# Patient Record
Sex: FEMALE | Race: BLACK OR AFRICAN AMERICAN | NOT HISPANIC OR LATINO | Employment: PART TIME | RURAL
[De-identification: names, ages, dates, MRNs, and addresses within clinical notes are randomized per-mention and may not be internally consistent; named-entity substitution may affect disease eponyms.]

---

## 2020-11-23 ENCOUNTER — HISTORICAL (OUTPATIENT)
Dept: ADMINISTRATIVE | Facility: HOSPITAL | Age: 60
End: 2020-11-23

## 2021-03-29 DIAGNOSIS — I10 HYPERTENSION, UNSPECIFIED TYPE: Primary | ICD-10-CM

## 2021-03-30 RX ORDER — AMLODIPINE BESYLATE 10 MG/1
10 TABLET ORAL DAILY
Qty: 30 TABLET | Refills: 3 | Status: CANCELLED | OUTPATIENT
Start: 2021-03-30 | End: 2022-03-30

## 2021-03-30 RX ORDER — AMLODIPINE BESYLATE 10 MG/1
1 TABLET ORAL DAILY
COMMUNITY
Start: 2021-01-31 | End: 2021-04-01 | Stop reason: SDUPTHER

## 2021-04-01 RX ORDER — AMLODIPINE BESYLATE 10 MG/1
10 TABLET ORAL DAILY
Qty: 30 TABLET | Refills: 11 | Status: CANCELLED | OUTPATIENT
Start: 2021-04-01 | End: 2022-04-01

## 2021-04-01 RX ORDER — AMLODIPINE BESYLATE 10 MG/1
10 TABLET ORAL DAILY
Qty: 30 TABLET | Refills: 3 | Status: SHIPPED | OUTPATIENT
Start: 2021-04-01 | End: 2021-04-07 | Stop reason: SDUPTHER

## 2021-04-07 ENCOUNTER — OFFICE VISIT (OUTPATIENT)
Dept: INTERNAL MEDICINE | Facility: CLINIC | Age: 61
End: 2021-04-07
Payer: COMMERCIAL

## 2021-04-07 VITALS
OXYGEN SATURATION: 98 % | BODY MASS INDEX: 32.82 KG/M2 | SYSTOLIC BLOOD PRESSURE: 166 MMHG | HEIGHT: 59 IN | WEIGHT: 162.81 LBS | RESPIRATION RATE: 18 BRPM | HEART RATE: 98 BPM | DIASTOLIC BLOOD PRESSURE: 90 MMHG

## 2021-04-07 DIAGNOSIS — K21.9 GASTROESOPHAGEAL REFLUX DISEASE, UNSPECIFIED WHETHER ESOPHAGITIS PRESENT: ICD-10-CM

## 2021-04-07 DIAGNOSIS — I10 ESSENTIAL HYPERTENSION: Primary | ICD-10-CM

## 2021-04-07 PROCEDURE — 99999 PR PBB SHADOW E&M-EST. PATIENT-LVL IV: ICD-10-PCS | Mod: PBBFAC,,, | Performed by: INTERNAL MEDICINE

## 2021-04-07 PROCEDURE — 99214 OFFICE O/P EST MOD 30 MIN: CPT | Mod: PBBFAC | Performed by: INTERNAL MEDICINE

## 2021-04-07 PROCEDURE — 99213 PR OFFICE/OUTPT VISIT, EST, LEVL III, 20-29 MIN: ICD-10-PCS | Mod: S$PBB,,, | Performed by: INTERNAL MEDICINE

## 2021-04-07 PROCEDURE — 99999 PR PBB SHADOW E&M-EST. PATIENT-LVL IV: CPT | Mod: PBBFAC,,, | Performed by: INTERNAL MEDICINE

## 2021-04-07 PROCEDURE — 99213 OFFICE O/P EST LOW 20 MIN: CPT | Mod: S$PBB,,, | Performed by: INTERNAL MEDICINE

## 2021-04-07 RX ORDER — AMLODIPINE BESYLATE 10 MG/1
10 TABLET ORAL DAILY
Qty: 30 TABLET | Refills: 3 | Status: SHIPPED | OUTPATIENT
Start: 2021-04-07 | End: 2021-10-18 | Stop reason: SDUPTHER

## 2021-04-07 RX ORDER — CARVEDILOL 6.25 MG/1
6.25 TABLET ORAL 2 TIMES DAILY
Qty: 60 TABLET | Refills: 4 | Status: SHIPPED | OUTPATIENT
Start: 2021-04-07 | End: 2021-10-18 | Stop reason: SDUPTHER

## 2021-04-07 RX ORDER — LISINOPRIL 20 MG/1
20 TABLET ORAL DAILY
Qty: 30 TABLET | Refills: 4 | Status: SHIPPED | OUTPATIENT
Start: 2021-04-07 | End: 2022-04-06 | Stop reason: SDUPTHER

## 2021-04-07 RX ORDER — PANTOPRAZOLE SODIUM 40 MG/1
1 TABLET, DELAYED RELEASE ORAL DAILY
COMMUNITY
Start: 2021-03-02 | End: 2021-04-07 | Stop reason: SDUPTHER

## 2021-04-07 RX ORDER — CARVEDILOL 6.25 MG/1
1 TABLET ORAL 2 TIMES DAILY
COMMUNITY
Start: 2021-01-31 | End: 2021-04-07 | Stop reason: SDUPTHER

## 2021-04-07 RX ORDER — PANTOPRAZOLE SODIUM 40 MG/1
40 TABLET, DELAYED RELEASE ORAL DAILY
Qty: 30 TABLET | Refills: 4 | Status: SHIPPED | OUTPATIENT
Start: 2021-04-07 | End: 2022-04-06 | Stop reason: SDUPTHER

## 2021-04-07 RX ORDER — LISINOPRIL 20 MG/1
20 TABLET ORAL DAILY
COMMUNITY
Start: 2021-03-22 | End: 2021-04-07 | Stop reason: SDUPTHER

## 2021-06-07 ENCOUNTER — OFFICE VISIT (OUTPATIENT)
Dept: INTERNAL MEDICINE | Facility: CLINIC | Age: 61
End: 2021-06-07
Payer: COMMERCIAL

## 2021-06-07 VITALS
DIASTOLIC BLOOD PRESSURE: 90 MMHG | WEIGHT: 161.19 LBS | HEART RATE: 89 BPM | SYSTOLIC BLOOD PRESSURE: 136 MMHG | BODY MASS INDEX: 32.49 KG/M2 | HEIGHT: 59 IN | RESPIRATION RATE: 17 BRPM | OXYGEN SATURATION: 100 %

## 2021-06-07 DIAGNOSIS — I10 ESSENTIAL HYPERTENSION: Primary | Chronic | ICD-10-CM

## 2021-06-07 DIAGNOSIS — K21.9 GASTROESOPHAGEAL REFLUX DISEASE, UNSPECIFIED WHETHER ESOPHAGITIS PRESENT: Chronic | ICD-10-CM

## 2021-06-07 PROCEDURE — 99212 OFFICE O/P EST SF 10 MIN: CPT | Mod: S$PBB,,, | Performed by: INTERNAL MEDICINE

## 2021-06-07 PROCEDURE — 99214 OFFICE O/P EST MOD 30 MIN: CPT | Mod: PBBFAC | Performed by: INTERNAL MEDICINE

## 2021-06-07 PROCEDURE — 99212 PR OFFICE/OUTPT VISIT, EST, LEVL II, 10-19 MIN: ICD-10-PCS | Mod: S$PBB,,, | Performed by: INTERNAL MEDICINE

## 2021-09-07 ENCOUNTER — OFFICE VISIT (OUTPATIENT)
Dept: FAMILY MEDICINE | Facility: CLINIC | Age: 61
End: 2021-09-07
Payer: COMMERCIAL

## 2021-09-07 VITALS
WEIGHT: 156 LBS | OXYGEN SATURATION: 100 % | BODY MASS INDEX: 31.45 KG/M2 | RESPIRATION RATE: 20 BRPM | SYSTOLIC BLOOD PRESSURE: 154 MMHG | HEIGHT: 59 IN | HEART RATE: 85 BPM | DIASTOLIC BLOOD PRESSURE: 100 MMHG

## 2021-09-07 DIAGNOSIS — F41.9 ANXIETY: Primary | ICD-10-CM

## 2021-09-07 DIAGNOSIS — K21.9 GASTROESOPHAGEAL REFLUX DISEASE, UNSPECIFIED WHETHER ESOPHAGITIS PRESENT: ICD-10-CM

## 2021-09-07 DIAGNOSIS — I10 ESSENTIAL HYPERTENSION: ICD-10-CM

## 2021-09-07 PROCEDURE — 99214 PR OFFICE/OUTPT VISIT, EST, LEVL IV, 30-39 MIN: ICD-10-PCS | Mod: ,,, | Performed by: INTERNAL MEDICINE

## 2021-09-07 PROCEDURE — 99214 OFFICE O/P EST MOD 30 MIN: CPT | Mod: ,,, | Performed by: INTERNAL MEDICINE

## 2021-09-07 RX ORDER — ALPRAZOLAM 0.25 MG/1
0.25 TABLET ORAL NIGHTLY PRN
Qty: 30 TABLET | Refills: 1 | Status: SHIPPED | OUTPATIENT
Start: 2021-09-07 | End: 2023-05-25 | Stop reason: SDUPTHER

## 2021-10-18 ENCOUNTER — OFFICE VISIT (OUTPATIENT)
Dept: FAMILY MEDICINE | Facility: CLINIC | Age: 61
End: 2021-10-18
Payer: COMMERCIAL

## 2021-10-18 VITALS
HEART RATE: 89 BPM | OXYGEN SATURATION: 98 % | SYSTOLIC BLOOD PRESSURE: 152 MMHG | WEIGHT: 157.63 LBS | DIASTOLIC BLOOD PRESSURE: 96 MMHG | RESPIRATION RATE: 18 BRPM | HEIGHT: 59 IN | BODY MASS INDEX: 31.78 KG/M2

## 2021-10-18 DIAGNOSIS — J32.9 SINUSITIS, UNSPECIFIED CHRONICITY, UNSPECIFIED LOCATION: ICD-10-CM

## 2021-10-18 DIAGNOSIS — I10 PRIMARY HYPERTENSION: ICD-10-CM

## 2021-10-18 DIAGNOSIS — R06.00 DYSPNEA, UNSPECIFIED TYPE: Primary | ICD-10-CM

## 2021-10-18 PROCEDURE — 99214 PR OFFICE/OUTPT VISIT, EST, LEVL IV, 30-39 MIN: ICD-10-PCS | Mod: ,,, | Performed by: INTERNAL MEDICINE

## 2021-10-18 PROCEDURE — 99214 OFFICE O/P EST MOD 30 MIN: CPT | Mod: ,,, | Performed by: INTERNAL MEDICINE

## 2021-10-18 RX ORDER — CARVEDILOL 6.25 MG/1
6.25 TABLET ORAL 2 TIMES DAILY
Qty: 60 TABLET | Refills: 3 | Status: SHIPPED | OUTPATIENT
Start: 2021-10-18 | End: 2022-04-06 | Stop reason: SDUPTHER

## 2021-10-18 RX ORDER — AMLODIPINE BESYLATE 10 MG/1
10 TABLET ORAL DAILY
Qty: 30 TABLET | Refills: 3 | Status: SHIPPED | OUTPATIENT
Start: 2021-10-18 | End: 2022-04-08

## 2021-10-18 RX ORDER — AMOXICILLIN AND CLAVULANATE POTASSIUM 500; 125 MG/1; MG/1
1 TABLET, FILM COATED ORAL 2 TIMES DAILY
Qty: 14 TABLET | Refills: 0 | Status: SHIPPED | OUTPATIENT
Start: 2021-10-18 | End: 2021-12-20 | Stop reason: SDUPTHER

## 2021-12-20 ENCOUNTER — OFFICE VISIT (OUTPATIENT)
Dept: FAMILY MEDICINE | Facility: CLINIC | Age: 61
End: 2021-12-20
Payer: COMMERCIAL

## 2021-12-20 VITALS
BODY MASS INDEX: 31.85 KG/M2 | DIASTOLIC BLOOD PRESSURE: 90 MMHG | RESPIRATION RATE: 80 BRPM | HEART RATE: 80 BPM | OXYGEN SATURATION: 99 % | HEIGHT: 59 IN | SYSTOLIC BLOOD PRESSURE: 136 MMHG | WEIGHT: 158 LBS

## 2021-12-20 DIAGNOSIS — I50.9 CONGESTIVE HEART FAILURE, UNSPECIFIED HF CHRONICITY, UNSPECIFIED HEART FAILURE TYPE: ICD-10-CM

## 2021-12-20 DIAGNOSIS — J32.9 SINUSITIS, UNSPECIFIED CHRONICITY, UNSPECIFIED LOCATION: Primary | ICD-10-CM

## 2021-12-20 PROCEDURE — 4010F ACE/ARB THERAPY RXD/TAKEN: CPT | Mod: CPTII,,, | Performed by: INTERNAL MEDICINE

## 2021-12-20 PROCEDURE — 99213 OFFICE O/P EST LOW 20 MIN: CPT | Mod: ,,, | Performed by: INTERNAL MEDICINE

## 2021-12-20 PROCEDURE — 4010F PR ACE/ARB THEARPY RXD/TAKEN: ICD-10-PCS | Mod: CPTII,,, | Performed by: INTERNAL MEDICINE

## 2021-12-20 PROCEDURE — 99213 PR OFFICE/OUTPT VISIT, EST, LEVL III, 20-29 MIN: ICD-10-PCS | Mod: ,,, | Performed by: INTERNAL MEDICINE

## 2021-12-21 RX ORDER — AMOXICILLIN AND CLAVULANATE POTASSIUM 500; 125 MG/1; MG/1
1 TABLET, FILM COATED ORAL 2 TIMES DAILY
Qty: 14 TABLET | Refills: 0 | Status: SHIPPED | OUTPATIENT
Start: 2021-12-21 | End: 2022-04-06

## 2022-04-06 ENCOUNTER — OFFICE VISIT (OUTPATIENT)
Dept: FAMILY MEDICINE | Facility: CLINIC | Age: 62
End: 2022-04-06
Payer: COMMERCIAL

## 2022-04-06 VITALS
WEIGHT: 151.81 LBS | DIASTOLIC BLOOD PRESSURE: 68 MMHG | RESPIRATION RATE: 18 BRPM | SYSTOLIC BLOOD PRESSURE: 116 MMHG | HEART RATE: 80 BPM | TEMPERATURE: 98 F | HEIGHT: 59 IN | OXYGEN SATURATION: 97 % | BODY MASS INDEX: 30.6 KG/M2

## 2022-04-06 DIAGNOSIS — I10 PRIMARY HYPERTENSION: Primary | ICD-10-CM

## 2022-04-06 DIAGNOSIS — Z12.39 SCREENING BREAST EXAMINATION: ICD-10-CM

## 2022-04-06 DIAGNOSIS — K21.9 GASTROESOPHAGEAL REFLUX DISEASE, UNSPECIFIED WHETHER ESOPHAGITIS PRESENT: ICD-10-CM

## 2022-04-06 LAB
ANION GAP SERPL CALCULATED.3IONS-SCNC: 8 MMOL/L (ref 7–16)
BUN SERPL-MCNC: 10 MG/DL (ref 7–18)
BUN/CREAT SERPL: 11 (ref 6–20)
CALCIUM SERPL-MCNC: 9.5 MG/DL (ref 8.5–10.1)
CHLORIDE SERPL-SCNC: 104 MMOL/L (ref 98–107)
CO2 SERPL-SCNC: 32 MMOL/L (ref 21–32)
CREAT SERPL-MCNC: 0.88 MG/DL (ref 0.55–1.02)
GLUCOSE SERPL-MCNC: 96 MG/DL (ref 74–106)
POTASSIUM SERPL-SCNC: 2.6 MMOL/L (ref 3.5–5.1)
SODIUM SERPL-SCNC: 141 MMOL/L (ref 136–145)

## 2022-04-06 PROCEDURE — 4010F ACE/ARB THERAPY RXD/TAKEN: CPT | Mod: CPTII,,, | Performed by: INTERNAL MEDICINE

## 2022-04-06 PROCEDURE — 1159F MED LIST DOCD IN RCRD: CPT | Mod: CPTII,,, | Performed by: INTERNAL MEDICINE

## 2022-04-06 PROCEDURE — 3008F PR BODY MASS INDEX (BMI) DOCUMENTED: ICD-10-PCS | Mod: CPTII,,, | Performed by: INTERNAL MEDICINE

## 2022-04-06 PROCEDURE — 99214 PR OFFICE/OUTPT VISIT, EST, LEVL IV, 30-39 MIN: ICD-10-PCS | Mod: ,,, | Performed by: INTERNAL MEDICINE

## 2022-04-06 PROCEDURE — 1159F PR MEDICATION LIST DOCUMENTED IN MEDICAL RECORD: ICD-10-PCS | Mod: CPTII,,, | Performed by: INTERNAL MEDICINE

## 2022-04-06 PROCEDURE — 3074F PR MOST RECENT SYSTOLIC BLOOD PRESSURE < 130 MM HG: ICD-10-PCS | Mod: CPTII,,, | Performed by: INTERNAL MEDICINE

## 2022-04-06 PROCEDURE — 3078F PR MOST RECENT DIASTOLIC BLOOD PRESSURE < 80 MM HG: ICD-10-PCS | Mod: CPTII,,, | Performed by: INTERNAL MEDICINE

## 2022-04-06 PROCEDURE — 4010F PR ACE/ARB THEARPY RXD/TAKEN: ICD-10-PCS | Mod: CPTII,,, | Performed by: INTERNAL MEDICINE

## 2022-04-06 PROCEDURE — 99214 OFFICE O/P EST MOD 30 MIN: CPT | Mod: ,,, | Performed by: INTERNAL MEDICINE

## 2022-04-06 PROCEDURE — 3008F BODY MASS INDEX DOCD: CPT | Mod: CPTII,,, | Performed by: INTERNAL MEDICINE

## 2022-04-06 PROCEDURE — 3078F DIAST BP <80 MM HG: CPT | Mod: CPTII,,, | Performed by: INTERNAL MEDICINE

## 2022-04-06 PROCEDURE — 3074F SYST BP LT 130 MM HG: CPT | Mod: CPTII,,, | Performed by: INTERNAL MEDICINE

## 2022-04-06 PROCEDURE — 1160F PR REVIEW ALL MEDS BY PRESCRIBER/CLIN PHARMACIST DOCUMENTED: ICD-10-PCS | Mod: CPTII,,, | Performed by: INTERNAL MEDICINE

## 2022-04-06 PROCEDURE — 80048 BASIC METABOLIC PANEL: ICD-10-PCS | Mod: ,,, | Performed by: CLINICAL MEDICAL LABORATORY

## 2022-04-06 PROCEDURE — 1160F RVW MEDS BY RX/DR IN RCRD: CPT | Mod: CPTII,,, | Performed by: INTERNAL MEDICINE

## 2022-04-06 PROCEDURE — 80048 BASIC METABOLIC PNL TOTAL CA: CPT | Mod: ,,, | Performed by: CLINICAL MEDICAL LABORATORY

## 2022-04-06 RX ORDER — PANTOPRAZOLE SODIUM 40 MG/1
40 TABLET, DELAYED RELEASE ORAL DAILY
Qty: 90 TABLET | Refills: 1 | Status: SHIPPED | OUTPATIENT
Start: 2022-04-06 | End: 2022-06-15

## 2022-04-06 RX ORDER — CARVEDILOL 6.25 MG/1
6.25 TABLET ORAL 2 TIMES DAILY
Qty: 180 TABLET | Refills: 1 | Status: SHIPPED | OUTPATIENT
Start: 2022-04-06 | End: 2022-08-29 | Stop reason: SDUPTHER

## 2022-04-06 RX ORDER — LISINOPRIL 20 MG/1
20 TABLET ORAL DAILY
Qty: 90 TABLET | Refills: 1 | Status: SHIPPED | OUTPATIENT
Start: 2022-04-06 | End: 2022-10-24

## 2022-04-07 NOTE — PROGRESS NOTES
Subjective:       Patient ID: Faviola King is a 61 y.o. female.    Chief Complaint: Hypertension, Follow-up, and Medication Refill    Patient is here today for a follow up evaluation. Patient pressure is stable today on intake. Patient states pressure is up and down. Patient also states acid reflux has been better. Discussed health maintenance with patient. Recommend Mammogram and Colonoscopy. Will order baseline labs today. Will follow in 3 months.       Current Medications:    Current Outpatient Medications:     ALPRAZolam (XANAX) 0.25 MG tablet, Take 1 tablet (0.25 mg total) by mouth nightly as needed for Anxiety., Disp: 30 tablet, Rfl: 1    amLODIPine (NORVASC) 10 MG tablet, Take 1 tablet (10 mg total) by mouth once daily., Disp: 30 tablet, Rfl: 3    carvediloL (COREG) 6.25 MG tablet, Take 1 tablet (6.25 mg total) by mouth 2 (two) times a day., Disp: 180 tablet, Rfl: 1    lisinopriL (PRINIVIL,ZESTRIL) 20 MG tablet, Take 1 tablet (20 mg total) by mouth once daily., Disp: 90 tablet, Rfl: 1    pantoprazole (PROTONIX) 40 MG tablet, Take 1 tablet (40 mg total) by mouth once daily., Disp: 90 tablet, Rfl: 1    Last Labs:     Office Visit on 04/06/2022   Component Date Value    Sodium 04/06/2022 141     Potassium 04/06/2022 2.6 (A)    Chloride 04/06/2022 104     CO2 04/06/2022 32     Anion Gap 04/06/2022 8     Glucose 04/06/2022 96     BUN 04/06/2022 10     Creatinine 04/06/2022 0.88     BUN/Creatinine Ratio 04/06/2022 11     Calcium 04/06/2022 9.5     eGFR 04/06/2022 69        Last Imaging:  X-Ray Sinuses 3 or more views  Narrative: EXAMINATION:  XR SINUSES MIN 3 VIEWS    CLINICAL HISTORY:  Chronic sinusitis, unspecified    COMPARISON:  None    TECHNIQUE:  Three views of the sinuses.    FINDINGS:  There is a 2.9 cm round density projecting over the right aspect of the calvarium on frontal view which could be intracranial but is not demonstrated with certainty on lateral view.  Recommend CT brain for  further evaluation.  No significant sinus opacification or air-fluid level to suggest acute sinusitis.  Impression: As above.    Point of Service: [facility]    Electronically signed by: Dong Riggs  Date:    10/18/2021  Time:    15:57  X-Ray Chest PA And Lateral  Narrative: EXAMINATION:  XR CHEST PA AND LATERAL    CLINICAL HISTORY:  Dyspnea, unspecified    COMPARISON:  Chest x-ray October 9, 2019    TECHNIQUE:  Frontal and lateral views of the chest.    FINDINGS:  The cardiomediastinal silhouette is stable in configuration.  Chronic change of the lungs without focal consolidation, pleural effusion, or pneumothorax.  Visualized osseous and surrounding soft tissue structures appear grossly unchanged.  Impression: Stable chest x-ray without acute cardiopulmonary process demonstrated.    Point of Service: [facility]    Electronically signed by: Dong Riggs  Date:    10/18/2021  Time:    15:54         Review of Systems   All other systems reviewed and are negative.        Objective:      Physical Exam  Vitals reviewed.   Constitutional:       Appearance: Normal appearance. She is normal weight.   Cardiovascular:      Rate and Rhythm: Normal rate and regular rhythm.      Pulses: Normal pulses.      Heart sounds: Normal heart sounds.   Pulmonary:      Effort: Pulmonary effort is normal.      Breath sounds: Normal breath sounds.   Abdominal:      General: Abdomen is flat. Bowel sounds are normal.      Palpations: Abdomen is soft.   Musculoskeletal:         General: Normal range of motion.      Cervical back: Normal range of motion and neck supple.   Skin:     General: Skin is warm and dry.   Neurological:      General: No focal deficit present.      Mental Status: She is alert and oriented to person, place, and time. Mental status is at baseline.         Assessment:       1. Primary hypertension  Basic Metabolic Panel    Basic Metabolic Panel   2. Gastroesophageal reflux disease, unspecified  whether esophagitis present  Ambulatory referral/consult to Gastroenterology   3. Screening breast examination  Mammo Digital Screening Bilat        Plan:         Faviola was seen today for hypertension, follow-up and medication refill.    Diagnoses and all orders for this visit:    Primary hypertension  -     Basic Metabolic Panel; Future  -     Basic Metabolic Panel    Gastroesophageal reflux disease, unspecified whether esophagitis present  -     Ambulatory referral/consult to Gastroenterology; Future    Screening breast examination  -     Mammo Digital Screening Bilat; Future    Other orders  -     carvediloL (COREG) 6.25 MG tablet; Take 1 tablet (6.25 mg total) by mouth 2 (two) times a day.  -     lisinopriL (PRINIVIL,ZESTRIL) 20 MG tablet; Take 1 tablet (20 mg total) by mouth once daily.  -     pantoprazole (PROTONIX) 40 MG tablet; Take 1 tablet (40 mg total) by mouth once daily.

## 2022-04-07 NOTE — PATIENT INSTRUCTIONS
Faviola was seen today for hypertension, follow-up and medication refill.    Diagnoses and all orders for this visit:    Primary hypertension  -     Basic Metabolic Panel; Future  -     Basic Metabolic Panel    Gastroesophageal reflux disease, unspecified whether esophagitis present  -     Ambulatory referral/consult to Gastroenterology; Future    Screening breast examination  -     Mammo Digital Screening Bilat; Future    Other orders  -     carvediloL (COREG) 6.25 MG tablet; Take 1 tablet (6.25 mg total) by mouth 2 (two) times a day.  -     lisinopriL (PRINIVIL,ZESTRIL) 20 MG tablet; Take 1 tablet (20 mg total) by mouth once daily.  -     pantoprazole (PROTONIX) 40 MG tablet; Take 1 tablet (40 mg total) by mouth once daily.

## 2022-04-11 DIAGNOSIS — E87.6 HYPOKALEMIA: Primary | ICD-10-CM

## 2022-04-11 RX ORDER — POTASSIUM CHLORIDE 20 MEQ/1
20 TABLET, EXTENDED RELEASE ORAL 3 TIMES DAILY
Qty: 9 TABLET | Refills: 0 | Status: SHIPPED | OUTPATIENT
Start: 2022-04-11 | End: 2022-04-14

## 2022-04-11 NOTE — TELEPHONE ENCOUNTER
Call made to pt to inform her that her K+ level is low and that Dr. Larson Is sending her in Potassium 20meq to take 1 tab tid x 3 days then come in on Friday for K+ level recheck. Pt stated that she has another appt on Friday and can't come on that day. I told her that she will be coming in for lab only and to come on Monday morning if she doesn't make it on Friday. Pt verbalized understanding and had no other concerns.

## 2022-04-11 NOTE — TELEPHONE ENCOUNTER
----- Message from Shantanu Larson MD sent at 4/7/2022  6:13 PM CDT -----  Kcl 20meq pi tid x 3 days     stat    today    k level Monday am please      call pt

## 2022-05-01 ENCOUNTER — HOSPITAL ENCOUNTER (EMERGENCY)
Facility: HOSPITAL | Age: 62
Discharge: HOME | End: 2022-05-02
Attending: FAMILY MEDICINE
Payer: COMMERCIAL

## 2022-05-01 DIAGNOSIS — N12 PYELONEPHRITIS: Primary | ICD-10-CM

## 2022-05-01 LAB
BACTERIA #/AREA URNS HPF: ABNORMAL /HPF
BASOPHILS # BLD AUTO: 0.03 K/UL (ref 0–0.2)
BASOPHILS NFR BLD AUTO: 0.2 % (ref 0–1)
BILIRUB UR QL STRIP: NEGATIVE
CLARITY UR: ABNORMAL
COLOR UR: YELLOW
DIFFERENTIAL METHOD BLD: ABNORMAL
EOSINOPHIL # BLD AUTO: 0.11 K/UL (ref 0–0.5)
EOSINOPHIL NFR BLD AUTO: 0.8 % (ref 1–4)
ERYTHROCYTE [DISTWIDTH] IN BLOOD BY AUTOMATED COUNT: 14.8 % (ref 11.5–14.5)
GLUCOSE UR STRIP-MCNC: NEGATIVE MG/DL
HCT VFR BLD AUTO: 41.9 % (ref 38–47)
HGB BLD-MCNC: 14 G/DL (ref 12–16)
IMM GRANULOCYTES # BLD AUTO: 0.05 K/UL (ref 0–0.04)
IMM GRANULOCYTES NFR BLD: 0.4 % (ref 0–0.4)
KETONES UR STRIP-SCNC: NEGATIVE MG/DL
LEUKOCYTE ESTERASE UR QL STRIP: ABNORMAL
LYMPHOCYTES # BLD AUTO: 1.95 K/UL (ref 1–4.8)
LYMPHOCYTES NFR BLD AUTO: 13.7 % (ref 27–41)
MCH RBC QN AUTO: 29.9 PG (ref 27–31)
MCHC RBC AUTO-ENTMCNC: 33.4 G/DL (ref 32–36)
MCV RBC AUTO: 89.5 FL (ref 80–96)
MONOCYTES # BLD AUTO: 1.14 K/UL (ref 0–0.8)
MONOCYTES NFR BLD AUTO: 8 % (ref 2–6)
MPC BLD CALC-MCNC: 9.7 FL (ref 9.4–12.4)
MUCOUS THREADS #/AREA URNS HPF: ABNORMAL /HPF
NEUTROPHILS # BLD AUTO: 10.97 K/UL (ref 1.8–7.7)
NEUTROPHILS NFR BLD AUTO: 76.9 % (ref 53–65)
NITRITE UR QL STRIP: NEGATIVE
NRBC # BLD AUTO: 0 X10E3/UL
NRBC, AUTO (.00): 0 %
PH UR STRIP: 6 PH UNITS
PLATELET # BLD AUTO: 311 K/UL (ref 150–400)
PROT UR QL STRIP: >=300
RBC # BLD AUTO: 4.68 M/UL (ref 4.2–5.4)
RBC # UR STRIP: ABNORMAL /UL
RBC #/AREA URNS HPF: ABNORMAL /HPF
SP GR UR STRIP: 1.02
SQUAMOUS #/AREA URNS LPF: ABNORMAL /LPF
TRICHOMONAS #/AREA URNS HPF: ABNORMAL /HPF
UROBILINOGEN UR STRIP-ACNC: 0.2 MG/DL
WBC # BLD AUTO: 14.25 K/UL (ref 4.5–11)
WBC #/AREA URNS HPF: ABNORMAL /HPF
WBC CLUMPS, UA: ABNORMAL /HPF

## 2022-05-01 PROCEDURE — 87077 CULTURE AEROBIC IDENTIFY: CPT | Performed by: FAMILY MEDICINE

## 2022-05-01 PROCEDURE — 83690 ASSAY OF LIPASE: CPT | Performed by: FAMILY MEDICINE

## 2022-05-01 PROCEDURE — 99284 EMERGENCY DEPT VISIT MOD MDM: CPT | Mod: 25

## 2022-05-01 PROCEDURE — 80053 COMPREHEN METABOLIC PANEL: CPT | Performed by: FAMILY MEDICINE

## 2022-05-01 PROCEDURE — 85025 COMPLETE CBC W/AUTO DIFF WBC: CPT | Performed by: FAMILY MEDICINE

## 2022-05-01 PROCEDURE — 96375 TX/PRO/DX INJ NEW DRUG ADDON: CPT

## 2022-05-01 PROCEDURE — 99283 PR EMERGENCY DEPT VISIT,LEVEL III: ICD-10-PCS | Mod: ,,, | Performed by: FAMILY MEDICINE

## 2022-05-01 PROCEDURE — 96365 THER/PROPH/DIAG IV INF INIT: CPT

## 2022-05-01 PROCEDURE — 87186 SC STD MICRODIL/AGAR DIL: CPT | Performed by: FAMILY MEDICINE

## 2022-05-01 PROCEDURE — 96361 HYDRATE IV INFUSION ADD-ON: CPT

## 2022-05-01 PROCEDURE — 63600175 PHARM REV CODE 636 W HCPCS: Performed by: FAMILY MEDICINE

## 2022-05-01 PROCEDURE — 81001 URINALYSIS AUTO W/SCOPE: CPT | Performed by: FAMILY MEDICINE

## 2022-05-01 PROCEDURE — 36415 COLL VENOUS BLD VENIPUNCTURE: CPT | Performed by: FAMILY MEDICINE

## 2022-05-01 PROCEDURE — 99283 EMERGENCY DEPT VISIT LOW MDM: CPT | Mod: ,,, | Performed by: FAMILY MEDICINE

## 2022-05-01 PROCEDURE — 25000003 PHARM REV CODE 250: Performed by: FAMILY MEDICINE

## 2022-05-01 RX ORDER — ONDANSETRON 2 MG/ML
4 INJECTION INTRAMUSCULAR; INTRAVENOUS
Status: COMPLETED | OUTPATIENT
Start: 2022-05-01 | End: 2022-05-01

## 2022-05-01 RX ADMIN — ONDANSETRON 4 MG: 2 INJECTION INTRAMUSCULAR; INTRAVENOUS at 11:05

## 2022-05-01 RX ADMIN — SODIUM CHLORIDE 500 ML: 9 INJECTION, SOLUTION INTRAVENOUS at 11:05

## 2022-05-01 NOTE — Clinical Note
"Faviola "Faviola" Fernando was seen and treated in our emergency department on 5/1/2022.  She may return to work on 05/05/2022.       If you have any questions or concerns, please don't hesitate to call.      Mamta Ohara MD"

## 2022-05-02 ENCOUNTER — TELEPHONE (OUTPATIENT)
Dept: EMERGENCY MEDICINE | Facility: HOSPITAL | Age: 62
End: 2022-05-02
Payer: COMMERCIAL

## 2022-05-02 VITALS
HEIGHT: 59 IN | HEART RATE: 77 BPM | SYSTOLIC BLOOD PRESSURE: 107 MMHG | WEIGHT: 151 LBS | TEMPERATURE: 98 F | DIASTOLIC BLOOD PRESSURE: 60 MMHG | OXYGEN SATURATION: 98 % | BODY MASS INDEX: 30.44 KG/M2 | RESPIRATION RATE: 25 BRPM

## 2022-05-02 LAB
ALBUMIN SERPL BCP-MCNC: 3.7 G/DL (ref 3.5–5)
ALBUMIN/GLOB SERPL: 0.9 {RATIO}
ALP SERPL-CCNC: 78 U/L (ref 50–130)
ALT SERPL W P-5'-P-CCNC: 13 U/L (ref 13–56)
ANION GAP SERPL CALCULATED.3IONS-SCNC: 10 MMOL/L (ref 7–16)
AST SERPL W P-5'-P-CCNC: 12 U/L (ref 15–37)
BILIRUB SERPL-MCNC: 0.3 MG/DL (ref 0–1.2)
BUN SERPL-MCNC: 11 MG/DL (ref 7–18)
BUN/CREAT SERPL: 14 (ref 6–20)
CALCIUM SERPL-MCNC: 9.6 MG/DL (ref 8.5–10.1)
CHLORIDE SERPL-SCNC: 103 MMOL/L (ref 98–107)
CO2 SERPL-SCNC: 31 MMOL/L (ref 21–32)
CREAT SERPL-MCNC: 0.76 MG/DL (ref 0.55–1.02)
GLOBULIN SER-MCNC: 4 G/DL (ref 2–4)
GLUCOSE SERPL-MCNC: 93 MG/DL (ref 74–106)
LIPASE SERPL-CCNC: 115 U/L (ref 73–393)
POTASSIUM SERPL-SCNC: 3.1 MMOL/L (ref 3.5–5.1)
PROT SERPL-MCNC: 7.7 G/DL (ref 6.4–8.2)
SODIUM SERPL-SCNC: 141 MMOL/L (ref 136–145)

## 2022-05-02 PROCEDURE — 25000003 PHARM REV CODE 250: Performed by: FAMILY MEDICINE

## 2022-05-02 PROCEDURE — 63600175 PHARM REV CODE 636 W HCPCS: Performed by: FAMILY MEDICINE

## 2022-05-02 RX ORDER — ONDANSETRON 4 MG/1
8 TABLET, ORALLY DISINTEGRATING ORAL 2 TIMES DAILY
Qty: 20 TABLET | Refills: 0 | Status: SHIPPED | OUTPATIENT
Start: 2022-05-02

## 2022-05-02 RX ORDER — AMOXICILLIN AND CLAVULANATE POTASSIUM 875; 125 MG/1; MG/1
1 TABLET, FILM COATED ORAL 2 TIMES DAILY
Qty: 14 TABLET | Refills: 0 | Status: SHIPPED | OUTPATIENT
Start: 2022-05-02 | End: 2022-05-02 | Stop reason: SDUPTHER

## 2022-05-02 RX ORDER — AMOXICILLIN AND CLAVULANATE POTASSIUM 875; 125 MG/1; MG/1
1 TABLET, FILM COATED ORAL 2 TIMES DAILY
Qty: 20 TABLET | Refills: 0 | Status: SHIPPED | OUTPATIENT
Start: 2022-05-02 | End: 2022-10-24 | Stop reason: ALTCHOICE

## 2022-05-02 RX ADMIN — CEFTRIAXONE SODIUM 1 G: 1 INJECTION, POWDER, FOR SOLUTION INTRAMUSCULAR; INTRAVENOUS at 12:05

## 2022-05-02 NOTE — ED PROVIDER NOTES
Encounter Date: 2022    SCRIBE #1 NOTE: I, Barbara Jeryr, am scribing for, and in the presence of,  Mamta Ohara. I have scribed the entire note.       History     Chief Complaint   Patient presents with    Abdominal Pain     Abdominal pain starting around 2100, burning with urination     The patient is a 61 year old female with complaints of abdominal pain. She reports the pain began at approximately 21:00 and is accompanied by urinary frequency, dysuria, and vomiting. She reports the pain is in the suprapubic region and radiates into her back. She denies any recent fever or other complaints.    The history is provided by the patient. No  was used.     Review of patient's allergies indicates:  No Known Allergies  Past Medical History:   Diagnosis Date    GERD (gastroesophageal reflux disease)     Hypertension      Past Surgical History:   Procedure Laterality Date     SECTION      HYSTERECTOMY       Family History   Problem Relation Age of Onset    Hypertension Neg Hx     Diabetes Neg Hx     Cancer Neg Hx      Social History     Tobacco Use    Smoking status: Current Some Day Smoker    Smokeless tobacco: Never Used   Substance Use Topics    Alcohol use: Never     Review of Systems   Constitutional: Negative for fever.   Gastrointestinal: Positive for abdominal pain and vomiting.   Genitourinary: Positive for dysuria and frequency.   All other systems reviewed and are negative.      Physical Exam     Initial Vitals [22 2302]   BP Pulse Resp Temp SpO2   131/87 91 18 98.4 °F (36.9 °C) 98 %      MAP       --         Physical Exam    Constitutional: She appears well-developed and well-nourished.   HENT:   Head: Normocephalic and atraumatic.   Eyes: Conjunctivae and EOM are normal. Pupils are equal, round, and reactive to light.   Neck: Neck supple.   Normal range of motion.  Cardiovascular: Normal rate and regular rhythm.   Pulmonary/Chest: Breath sounds normal. No  respiratory distress.   Abdominal: Abdomen is soft. There is abdominal tenderness (CVA tenderness).   Musculoskeletal:         General: Normal range of motion.      Cervical back: Normal range of motion and neck supple.     Neurological: She is alert and oriented to person, place, and time.   Skin: Skin is warm and dry. Capillary refill takes less than 2 seconds.   Psychiatric: She has a normal mood and affect. Thought content normal.         ED Course   Procedures  Labs Reviewed   URINALYSIS, REFLEX TO URINE CULTURE - Abnormal; Notable for the following components:       Result Value    Clarity, UA Slightly Cloudy (*)     Leukocytes, UA Moderate (*)     Protein, UA >=300 (*)     Blood, UA Large (*)     All other components within normal limits   COMPREHENSIVE METABOLIC PANEL - Abnormal; Notable for the following components:    Potassium 3.1 (*)     AST 12 (*)     All other components within normal limits   CBC WITH DIFFERENTIAL - Abnormal; Notable for the following components:    WBC 14.25 (*)     RDW 14.8 (*)     Neutrophils % 76.9 (*)     Lymphocytes % 13.7 (*)     Monocytes % 8.0 (*)     Eosinophils % 0.8 (*)     Neutrophils, Abs 10.97 (*)     Monocytes, Absolute 1.14 (*)     Immature Granulocytes, Absolute 0.05 (*)     All other components within normal limits   URINALYSIS, MICROSCOPIC - Abnormal; Notable for the following components:    WBC, UA Too Numerous To Count (*)     RBC, UA Too Numerous To Count (*)     Bacteria, UA Occasional (*)     Squamous Epithelial Cells, UA Few (*)     WBC Clumps Few (*)     Mucus, UA Rare (*)     Trichomonas, UA Rare (*)     All other components within normal limits   LIPASE - Normal   CULTURE, URINE   CBC W/ AUTO DIFFERENTIAL    Narrative:     The following orders were created for panel order CBC auto differential.  Procedure                               Abnormality         Status                     ---------                               -----------         ------                      CBC with Differential[[id number]]        Abnormal            Final result                 Please view results for these tests on the individual orders.   EXTRA TUBES    Narrative:     The following orders were created for panel order EXTRA TUBES.  Procedure                               Abnormality         Status                     ---------                               -----------         ------                     Light Blue Top Hold[[id number]]                              In process                 Light Green Top Hold[[id number]]                             In process                   Please view results for these tests on the individual orders.   LIGHT BLUE TOP HOLD   LIGHT GREEN TOP HOLD          Imaging Results    None          Medications   cefTRIAXone (ROCEPHIN) 1 g in dextrose 5 % in water (D5W) 5 % 50 mL IVPB (MB+) (1 g Intravenous New Bag 5/2/22 0016)   sodium chloride 0.9% bolus 500 mL (0 mLs Intravenous Stopped 5/2/22 0007)   ondansetron injection 4 mg (4 mg Intravenous Given 5/1/22 9817)                Attending Attestation:           Physician Attestation for Scribe:  Physician Attestation Statement for Scribe #1: I, Mamta Ohara, reviewed documentation, as scribed by Barbara Edwards in my presence, and it is both accurate and complete.                      Clinical Impression:   Final diagnoses:  [N12] Pyelonephritis (Primary)          ED Disposition Condition    Discharge Stable        ED Prescriptions     Medication Sig Dispense Start Date End Date Auth. Provider    amoxicillin-clavulanate 875-125mg (AUGMENTIN) 875-125 mg per tablet  (Status: Discontinued) Take 1 tablet by mouth 2 (two) times daily. 14 tablet 5/2/2022 5/2/2022 Mamta Ohara MD    amoxicillin-clavulanate 875-125mg (AUGMENTIN) 875-125 mg per tablet Take 1 tablet by mouth 2 (two) times daily. 20 tablet 5/2/2022  Mamta Ohara MD    ondansetron (ZOFRAN-ODT) 4 MG TbDL Take 2 tablets (8 mg total) by mouth 2 (two) times  daily. 20 tablet 5/2/2022  Mamta Ohara MD        Follow-up Information    None          Mamta Ohara MD  05/02/22 0037

## 2022-05-02 NOTE — DISCHARGE INSTRUCTIONS
Drink plenty of water. Take all your antibiotics until gone. Return to the ED if your symptoms worsen.

## 2022-05-03 ENCOUNTER — TELEPHONE (OUTPATIENT)
Dept: EMERGENCY MEDICINE | Facility: HOSPITAL | Age: 62
End: 2022-05-03
Payer: COMMERCIAL

## 2022-05-03 LAB — UA COMPLETE W REFLEX CULTURE PNL UR: ABNORMAL

## 2022-05-03 RX ORDER — CEFUROXIME AXETIL 500 MG/1
500 TABLET ORAL EVERY 12 HOURS
Qty: 20 TABLET | Refills: 0 | Status: SHIPPED | OUTPATIENT
Start: 2022-05-03 | End: 2022-05-13

## 2022-05-05 RX ORDER — AMLODIPINE BESYLATE 10 MG/1
10 TABLET ORAL DAILY
Qty: 90 TABLET | Refills: 1 | Status: SHIPPED | OUTPATIENT
Start: 2022-05-05 | End: 2022-08-29 | Stop reason: SDUPTHER

## 2022-05-24 ENCOUNTER — OFFICE VISIT (OUTPATIENT)
Dept: FAMILY MEDICINE | Facility: CLINIC | Age: 62
End: 2022-05-24
Payer: COMMERCIAL

## 2022-05-24 VITALS
HEART RATE: 84 BPM | SYSTOLIC BLOOD PRESSURE: 122 MMHG | HEIGHT: 59 IN | BODY MASS INDEX: 29.8 KG/M2 | OXYGEN SATURATION: 98 % | RESPIRATION RATE: 20 BRPM | TEMPERATURE: 99 F | WEIGHT: 147.81 LBS | DIASTOLIC BLOOD PRESSURE: 68 MMHG

## 2022-05-24 DIAGNOSIS — Z13.1 SCREENING FOR DIABETES MELLITUS: ICD-10-CM

## 2022-05-24 DIAGNOSIS — Z00.00 ENCOUNTER FOR GENERAL ADULT MEDICAL EXAMINATION WITHOUT ABNORMAL FINDINGS: Primary | ICD-10-CM

## 2022-05-24 DIAGNOSIS — Z13.220 SCREENING FOR LIPOID DISORDERS: ICD-10-CM

## 2022-05-24 LAB
CHOLEST SERPL-MCNC: 198 MG/DL (ref 0–200)
CHOLEST/HDLC SERPL: 4.8 {RATIO}
GLUCOSE SERPL-MCNC: 79 MG/DL (ref 74–106)
HDLC SERPL-MCNC: 41 MG/DL (ref 40–60)
LDLC SERPL CALC-MCNC: 109 MG/DL
LDLC/HDLC SERPL: 2.7 {RATIO}
NONHDLC SERPL-MCNC: 157 MG/DL
TRIGL SERPL-MCNC: 240 MG/DL (ref 35–150)
VLDLC SERPL-MCNC: 48 MG/DL

## 2022-05-24 PROCEDURE — 1159F PR MEDICATION LIST DOCUMENTED IN MEDICAL RECORD: ICD-10-PCS | Mod: CPTII,,, | Performed by: NURSE PRACTITIONER

## 2022-05-24 PROCEDURE — 3008F BODY MASS INDEX DOCD: CPT | Mod: CPTII,,, | Performed by: NURSE PRACTITIONER

## 2022-05-24 PROCEDURE — 3074F PR MOST RECENT SYSTOLIC BLOOD PRESSURE < 130 MM HG: ICD-10-PCS | Mod: CPTII,,, | Performed by: NURSE PRACTITIONER

## 2022-05-24 PROCEDURE — 1159F MED LIST DOCD IN RCRD: CPT | Mod: CPTII,,, | Performed by: NURSE PRACTITIONER

## 2022-05-24 PROCEDURE — 82947 GLUCOSE, RANDOM: ICD-10-PCS | Mod: ,,, | Performed by: CLINICAL MEDICAL LABORATORY

## 2022-05-24 PROCEDURE — 4010F PR ACE/ARB THEARPY RXD/TAKEN: ICD-10-PCS | Mod: CPTII,,, | Performed by: NURSE PRACTITIONER

## 2022-05-24 PROCEDURE — 1160F RVW MEDS BY RX/DR IN RCRD: CPT | Mod: CPTII,,, | Performed by: NURSE PRACTITIONER

## 2022-05-24 PROCEDURE — 80061 LIPID PANEL: ICD-10-PCS | Mod: GZ,,, | Performed by: CLINICAL MEDICAL LABORATORY

## 2022-05-24 PROCEDURE — 82947 ASSAY GLUCOSE BLOOD QUANT: CPT | Mod: ,,, | Performed by: CLINICAL MEDICAL LABORATORY

## 2022-05-24 PROCEDURE — 99396 PREV VISIT EST AGE 40-64: CPT | Mod: ,,, | Performed by: NURSE PRACTITIONER

## 2022-05-24 PROCEDURE — 1160F PR REVIEW ALL MEDS BY PRESCRIBER/CLIN PHARMACIST DOCUMENTED: ICD-10-PCS | Mod: CPTII,,, | Performed by: NURSE PRACTITIONER

## 2022-05-24 PROCEDURE — 3074F SYST BP LT 130 MM HG: CPT | Mod: CPTII,,, | Performed by: NURSE PRACTITIONER

## 2022-05-24 PROCEDURE — 99396 PR PREVENTIVE VISIT,EST,40-64: ICD-10-PCS | Mod: ,,, | Performed by: NURSE PRACTITIONER

## 2022-05-24 PROCEDURE — 4010F ACE/ARB THERAPY RXD/TAKEN: CPT | Mod: CPTII,,, | Performed by: NURSE PRACTITIONER

## 2022-05-24 PROCEDURE — 3008F PR BODY MASS INDEX (BMI) DOCUMENTED: ICD-10-PCS | Mod: CPTII,,, | Performed by: NURSE PRACTITIONER

## 2022-05-24 PROCEDURE — 3078F DIAST BP <80 MM HG: CPT | Mod: CPTII,,, | Performed by: NURSE PRACTITIONER

## 2022-05-24 PROCEDURE — 3078F PR MOST RECENT DIASTOLIC BLOOD PRESSURE < 80 MM HG: ICD-10-PCS | Mod: CPTII,,, | Performed by: NURSE PRACTITIONER

## 2022-05-24 PROCEDURE — 80061 LIPID PANEL: CPT | Mod: GZ,,, | Performed by: CLINICAL MEDICAL LABORATORY

## 2022-05-24 NOTE — PROGRESS NOTES
[facility]  Chief Complaint      Chief Complaint   Patient presents with    Annual Exam     [facility] wellness visit       History of Present Illness      Faviola King is a 61 y.o. female with chronic conditions of Hypertension and GERD. She presents today for a [facility] Wellness exam. The pt reports she is doing well. Reports ER visit at [facility] 2022 for treatment of Pylonephritis with Augmentin 875/125mg # 20.    Past Medical History:  Past Medical History:   Diagnosis Date    GERD (gastroesophageal reflux disease)     Hypertension        Past Surgical History:   has a past surgical history that includes Hysterectomy and  section.    Social History:  Social History     Tobacco Use    Smoking status: Current Some Day Smoker    Smokeless tobacco: Never Used   Substance Use Topics    Alcohol use: Never          Review of Systems   Constitutional: Negative for appetite change, fatigue, fever and unexpected weight change.   Respiratory: Negative for cough, shortness of breath and wheezing.    Cardiovascular: Negative for chest pain and leg swelling.   Gastrointestinal: Negative for abdominal pain, constipation, diarrhea, nausea and vomiting.   Genitourinary: Negative for decreased urine volume, difficulty urinating, dysuria, flank pain, frequency, pelvic pain and urgency.   Musculoskeletal: Negative for arthralgias and myalgias.   Skin: Negative for color change and rash.   Neurological: Negative for dizziness, syncope, weakness and headaches.        Medications:  Outpatient Encounter Medications as of 2022   Medication Sig Dispense Refill    ALPRAZolam (XANAX) 0.25 MG tablet Take 1 tablet (0.25 mg total) by mouth nightly as needed for Anxiety. 30 tablet 1    amLODIPine (NORVASC) 10 MG tablet Take 1 tablet (10 mg total) by mouth once daily. 90 tablet 1    amoxicillin-clavulanate 875-125mg (AUGMENTIN) 875-125 mg per tablet Take 1 tablet by mouth 2 (two) times  "daily. 20 tablet 0    carvediloL (COREG) 6.25 MG tablet Take 1 tablet (6.25 mg total) by mouth 2 (two) times a day. 180 tablet 1    lisinopriL (PRINIVIL,ZESTRIL) 20 MG tablet Take 1 tablet (20 mg total) by mouth once daily. 90 tablet 1    ondansetron (ZOFRAN-ODT) 4 MG TbDL Take 2 tablets (8 mg total) by mouth 2 (two) times daily. 20 tablet 0    pantoprazole (PROTONIX) 40 MG tablet Take 1 tablet (40 mg total) by mouth once daily. 90 tablet 1     No facility-administered encounter medications on file as of 5/24/2022.       Allergies:  Review of patient's allergies indicates:  No Known Allergies    Health Maintenance:    There is no immunization history on file for this patient.   Health Maintenance   Topic Date Due    Hepatitis C Screening  Never done    Lipid Panel  Never done    TETANUS VACCINE  Never done    Mammogram  Never done        Physical Exam      Vital Signs  Temp: 98.7 °F (37.1 °C)  Temp src: Oral  Pulse: 84  Resp: 20  SpO2: 98 %  BP: 122/68  BP Location: Left arm  Patient Position: Sitting  Height and Weight  Height: 4' 11" (149.9 cm)  Weight: 67 kg (147 lb 12.8 oz)  BSA (Calculated - sq m): 1.67 sq meters  BMI (Calculated): 29.8  Weight in (lb) to have BMI = 25: 123.5]    Physical Exam  Constitutional:       General: She is not in acute distress.     Appearance: Normal appearance.   HENT:      Head: Normocephalic.      Mouth/Throat:      Mouth: Mucous membranes are moist.      Pharynx: Oropharynx is clear.   Cardiovascular:      Rate and Rhythm: Normal rate and regular rhythm.      Pulses: Normal pulses.      Heart sounds: Normal heart sounds. No murmur heard.  Pulmonary:      Effort: No respiratory distress.      Breath sounds: Normal breath sounds. No wheezing, rhonchi or rales.   Abdominal:      General: Bowel sounds are normal.      Palpations: Abdomen is soft.      Tenderness: There is no abdominal tenderness.   Musculoskeletal:         General: Normal range of motion.      Cervical back: " Neck supple.   Skin:     General: Skin is warm and dry.   Neurological:      Mental Status: She is alert and oriented to person, place, and time.   Psychiatric:         Mood and Affect: Mood normal.         Behavior: Behavior normal.          Laboratory:  CBC:  Recent Labs   Lab 05/01/22  2311   WBC 14.25 H   RBC 4.68   Hemoglobin 14.0   Hematocrit 41.9   Platelet Count 311   MCV 89.5   MCH 29.9   MCHC 33.4     CMP:  Recent Labs   Lab 05/01/22  2311   Glucose 93   Calcium 9.6   Albumin 3.7   Total Protein 7.7   Sodium 141   Potassium 3.1 L   CO2 31   Chloride 103   BUN 11   Alk Phos 78   ALT 13   AST 12 L   Bilirubin, Total 0.3     LIPIDS:      TSH:      A1C:        Assessment/Plan     Faviola King is a 61 y.o.female with:     1. Screening for lipoid disorders  - Lipid Panel; Future  - Lipid Panel    2. Screening for diabetes mellitus  - Glucose, Random; Future  - Glucose, Random    3. Encounter for general adult medical examination without abnormal findings       Discussed Health goals to improve overall health and well-being such as maintaining a healthy blood pressure by reducing calorie intake, engaging in physical activity, and eating heart healthy low sodium low concentrated sweet  meals.   Keep all follow-up medical appointments. Take all medications as prescribed.      Continue current medications and maintain follow up with specialists.  Return to clinic for follow-up on chronic medical conditions with Dr. Larson as advised. Return to clinic in 1 year for annual wellness visit.       DEEDEE Alcaraz-[facility]

## 2022-06-15 ENCOUNTER — OFFICE VISIT (OUTPATIENT)
Dept: GASTROENTEROLOGY | Facility: CLINIC | Age: 62
End: 2022-06-15
Payer: COMMERCIAL

## 2022-06-15 VITALS
SYSTOLIC BLOOD PRESSURE: 159 MMHG | HEIGHT: 61 IN | RESPIRATION RATE: 18 BRPM | WEIGHT: 147 LBS | OXYGEN SATURATION: 98 % | BODY MASS INDEX: 27.75 KG/M2 | HEART RATE: 71 BPM | DIASTOLIC BLOOD PRESSURE: 91 MMHG

## 2022-06-15 DIAGNOSIS — R10.13 EPIGASTRIC PAIN: Primary | ICD-10-CM

## 2022-06-15 DIAGNOSIS — K21.9 GASTROESOPHAGEAL REFLUX DISEASE, UNSPECIFIED WHETHER ESOPHAGITIS PRESENT: ICD-10-CM

## 2022-06-15 DIAGNOSIS — Z12.11 ENCOUNTER FOR SCREENING COLONOSCOPY: ICD-10-CM

## 2022-06-15 PROCEDURE — 3008F BODY MASS INDEX DOCD: CPT | Mod: CPTII,,, | Performed by: NURSE PRACTITIONER

## 2022-06-15 PROCEDURE — 3008F PR BODY MASS INDEX (BMI) DOCUMENTED: ICD-10-PCS | Mod: CPTII,,, | Performed by: NURSE PRACTITIONER

## 2022-06-15 PROCEDURE — 3080F PR MOST RECENT DIASTOLIC BLOOD PRESSURE >= 90 MM HG: ICD-10-PCS | Mod: CPTII,,, | Performed by: NURSE PRACTITIONER

## 2022-06-15 PROCEDURE — 3077F PR MOST RECENT SYSTOLIC BLOOD PRESSURE >= 140 MM HG: ICD-10-PCS | Mod: CPTII,,, | Performed by: NURSE PRACTITIONER

## 2022-06-15 PROCEDURE — 99214 PR OFFICE/OUTPT VISIT, EST, LEVL IV, 30-39 MIN: ICD-10-PCS | Mod: ,,, | Performed by: NURSE PRACTITIONER

## 2022-06-15 PROCEDURE — 3080F DIAST BP >= 90 MM HG: CPT | Mod: CPTII,,, | Performed by: NURSE PRACTITIONER

## 2022-06-15 PROCEDURE — 99214 OFFICE O/P EST MOD 30 MIN: CPT | Mod: ,,, | Performed by: NURSE PRACTITIONER

## 2022-06-15 PROCEDURE — 4010F PR ACE/ARB THEARPY RXD/TAKEN: ICD-10-PCS | Mod: CPTII,,, | Performed by: NURSE PRACTITIONER

## 2022-06-15 PROCEDURE — 3077F SYST BP >= 140 MM HG: CPT | Mod: CPTII,,, | Performed by: NURSE PRACTITIONER

## 2022-06-15 PROCEDURE — 4010F ACE/ARB THERAPY RXD/TAKEN: CPT | Mod: CPTII,,, | Performed by: NURSE PRACTITIONER

## 2022-06-15 RX ORDER — PANTOPRAZOLE SODIUM 40 MG/1
40 TABLET, DELAYED RELEASE ORAL DAILY
Qty: 90 TABLET | Refills: 3 | Status: SHIPPED | OUTPATIENT
Start: 2022-06-15 | End: 2022-08-29 | Stop reason: SDUPTHER

## 2022-06-15 NOTE — PROGRESS NOTES
Pt is a 62 y/o BF here for reflux/burning with certain foods x3 months. On Zantac OTC for symptoms which controls somewhat but still has burning in epigastric area with foods. Not taking PPI per pt. Denies melena, dysphagia. States has lost from 162 to 147 lb over 2 months but not eating much due to symptoms.     Past Medical History:   Diagnosis Date    GERD (gastroesophageal reflux disease)     Hypertension      Review of Systems   Constitutional: Negative for chills and fever.   Respiratory: Negative for shortness of breath.    Cardiovascular: Negative for chest pain.   Gastrointestinal: Negative for abdominal pain, blood in stool, constipation, diarrhea, melena, nausea and vomiting.   Skin: Negative for rash.   Neurological: Negative for weakness.     Physical Exam  Vitals reviewed. Exam conducted with a chaperone present.   Constitutional:       General: She is not in acute distress.     Appearance: Normal appearance.   HENT:      Head: Normocephalic.   Eyes:      General: No scleral icterus.     Pupils: Pupils are equal, round, and reactive to light.   Cardiovascular:      Rate and Rhythm: Normal rate.   Pulmonary:      Effort: Pulmonary effort is normal.   Abdominal:      General: There is no distension.      Palpations: Abdomen is soft.      Tenderness: There is no abdominal tenderness. There is no guarding or rebound.   Skin:     General: Skin is warm and dry.   Neurological:      General: No focal deficit present.      Mental Status: She is alert and oriented to person, place, and time. Mental status is at baseline.   Psychiatric:         Mood and Affect: Mood normal.         Behavior: Behavior normal.         Thought Content: Thought content normal.         Judgment: Judgment normal.       Plan  -GB u/s  -antireflux measures  -Protonix 40 mg daily   -reassess at f/u for EGD if weight loss continues or GERD not controlled  -screening colonoscopy  -RTC 3 months, sooner PRN

## 2022-07-03 ENCOUNTER — HOSPITAL ENCOUNTER (EMERGENCY)
Facility: HOSPITAL | Age: 62
Discharge: HOME | End: 2022-07-03
Payer: COMMERCIAL

## 2022-07-03 VITALS
DIASTOLIC BLOOD PRESSURE: 72 MMHG | BODY MASS INDEX: 29.23 KG/M2 | TEMPERATURE: 98 F | HEART RATE: 73 BPM | OXYGEN SATURATION: 98 % | WEIGHT: 145 LBS | RESPIRATION RATE: 15 BRPM | SYSTOLIC BLOOD PRESSURE: 118 MMHG | HEIGHT: 59 IN

## 2022-07-03 DIAGNOSIS — E87.6 HYPOKALEMIA: Primary | ICD-10-CM

## 2022-07-03 DIAGNOSIS — M54.9 LEFT-SIDED BACK PAIN: ICD-10-CM

## 2022-07-03 DIAGNOSIS — K21.9 GASTROESOPHAGEAL REFLUX DISEASE, UNSPECIFIED WHETHER ESOPHAGITIS PRESENT: ICD-10-CM

## 2022-07-03 LAB
ALBUMIN SERPL BCP-MCNC: 3.8 G/DL (ref 3.5–5)
ALBUMIN/GLOB SERPL: 0.8 {RATIO}
ALP SERPL-CCNC: 71 U/L (ref 50–130)
ALT SERPL W P-5'-P-CCNC: 14 U/L (ref 13–56)
ANION GAP SERPL CALCULATED.3IONS-SCNC: 10 MMOL/L (ref 7–16)
AST SERPL W P-5'-P-CCNC: 23 U/L (ref 15–37)
BASOPHILS # BLD AUTO: 0.04 K/UL (ref 0–0.2)
BASOPHILS NFR BLD AUTO: 0.4 % (ref 0–1)
BILIRUB SERPL-MCNC: 0.3 MG/DL (ref 0–1.2)
BUN SERPL-MCNC: 11 MG/DL (ref 7–18)
BUN/CREAT SERPL: 13 (ref 6–20)
CALCIUM SERPL-MCNC: 9.4 MG/DL (ref 8.5–10.1)
CHLORIDE SERPL-SCNC: 102 MMOL/L (ref 98–107)
CO2 SERPL-SCNC: 30 MMOL/L (ref 21–32)
CREAT SERPL-MCNC: 0.85 MG/DL (ref 0.55–1.02)
DIFFERENTIAL METHOD BLD: ABNORMAL
EOSINOPHIL # BLD AUTO: 0.13 K/UL (ref 0–0.5)
EOSINOPHIL NFR BLD AUTO: 1.4 % (ref 1–4)
ERYTHROCYTE [DISTWIDTH] IN BLOOD BY AUTOMATED COUNT: 14.8 % (ref 11.5–14.5)
GLOBULIN SER-MCNC: 4.6 G/DL (ref 2–4)
GLUCOSE SERPL-MCNC: 94 MG/DL (ref 74–106)
HCT VFR BLD AUTO: 43.4 % (ref 38–47)
HGB BLD-MCNC: 14.9 G/DL (ref 12–16)
IMM GRANULOCYTES # BLD AUTO: 0.02 K/UL (ref 0–0.04)
IMM GRANULOCYTES NFR BLD: 0.2 % (ref 0–0.4)
LIPASE SERPL-CCNC: 139 U/L (ref 73–393)
LYMPHOCYTES # BLD AUTO: 2.63 K/UL (ref 1–4.8)
LYMPHOCYTES NFR BLD AUTO: 28.6 % (ref 27–41)
MAGNESIUM SERPL-MCNC: 2.2 MG/DL (ref 1.7–2.3)
MCH RBC QN AUTO: 29.8 PG (ref 27–31)
MCHC RBC AUTO-ENTMCNC: 34.3 G/DL (ref 32–36)
MCV RBC AUTO: 86.8 FL (ref 80–96)
MONOCYTES # BLD AUTO: 0.77 K/UL (ref 0–0.8)
MONOCYTES NFR BLD AUTO: 8.4 % (ref 2–6)
MPC BLD CALC-MCNC: 9.8 FL (ref 9.4–12.4)
NEUTROPHILS # BLD AUTO: 5.62 K/UL (ref 1.8–7.7)
NEUTROPHILS NFR BLD AUTO: 61 % (ref 53–65)
NRBC # BLD AUTO: 0 X10E3/UL
NRBC, AUTO (.00): 0 %
PLATELET # BLD AUTO: 327 K/UL (ref 150–400)
POTASSIUM SERPL-SCNC: 2.7 MMOL/L (ref 3.5–5.1)
PROT SERPL-MCNC: 8.4 G/DL (ref 6.4–8.2)
RBC # BLD AUTO: 5 M/UL (ref 4.2–5.4)
SODIUM SERPL-SCNC: 139 MMOL/L (ref 136–145)
TROPONIN I SERPL HS-MCNC: 7 PG/ML
WBC # BLD AUTO: 9.21 K/UL (ref 4.5–11)

## 2022-07-03 PROCEDURE — 93005 ELECTROCARDIOGRAM TRACING: CPT

## 2022-07-03 PROCEDURE — 99284 PR EMERGENCY DEPT VISIT,LEVEL IV: ICD-10-PCS | Mod: ,,, | Performed by: NURSE PRACTITIONER

## 2022-07-03 PROCEDURE — 83690 ASSAY OF LIPASE: CPT | Performed by: NURSE PRACTITIONER

## 2022-07-03 PROCEDURE — 84484 ASSAY OF TROPONIN QUANT: CPT | Performed by: NURSE PRACTITIONER

## 2022-07-03 PROCEDURE — 99285 EMERGENCY DEPT VISIT HI MDM: CPT | Mod: 25

## 2022-07-03 PROCEDURE — 99284 EMERGENCY DEPT VISIT MOD MDM: CPT | Mod: ,,, | Performed by: NURSE PRACTITIONER

## 2022-07-03 PROCEDURE — 36415 COLL VENOUS BLD VENIPUNCTURE: CPT | Performed by: NURSE PRACTITIONER

## 2022-07-03 PROCEDURE — 63600175 PHARM REV CODE 636 W HCPCS: Performed by: NURSE PRACTITIONER

## 2022-07-03 PROCEDURE — 93010 EKG 12-LEAD: ICD-10-PCS | Mod: ,,, | Performed by: HOSPITALIST

## 2022-07-03 PROCEDURE — 96374 THER/PROPH/DIAG INJ IV PUSH: CPT

## 2022-07-03 PROCEDURE — 25000003 PHARM REV CODE 250: Performed by: NURSE PRACTITIONER

## 2022-07-03 PROCEDURE — 83735 ASSAY OF MAGNESIUM: CPT | Performed by: NURSE PRACTITIONER

## 2022-07-03 PROCEDURE — 85025 COMPLETE CBC W/AUTO DIFF WBC: CPT | Performed by: NURSE PRACTITIONER

## 2022-07-03 PROCEDURE — 80053 COMPREHEN METABOLIC PANEL: CPT | Performed by: NURSE PRACTITIONER

## 2022-07-03 PROCEDURE — 93010 ELECTROCARDIOGRAM REPORT: CPT | Mod: ,,, | Performed by: HOSPITALIST

## 2022-07-03 RX ORDER — POTASSIUM CHLORIDE 750 MG/1
10 TABLET, EXTENDED RELEASE ORAL DAILY
Qty: 7 TABLET | Refills: 0 | Status: SHIPPED | OUTPATIENT
Start: 2022-07-03 | End: 2022-07-10

## 2022-07-03 RX ORDER — MAG HYDROX/ALUMINUM HYD/SIMETH 200-200-20
30 SUSPENSION, ORAL (FINAL DOSE FORM) ORAL ONCE
Status: COMPLETED | OUTPATIENT
Start: 2022-07-03 | End: 2022-07-03

## 2022-07-03 RX ORDER — POTASSIUM CHLORIDE 20 MEQ/1
40 TABLET, EXTENDED RELEASE ORAL
Status: COMPLETED | OUTPATIENT
Start: 2022-07-03 | End: 2022-07-03

## 2022-07-03 RX ORDER — KETOROLAC TROMETHAMINE 15 MG/ML
15 INJECTION, SOLUTION INTRAMUSCULAR; INTRAVENOUS
Status: COMPLETED | OUTPATIENT
Start: 2022-07-03 | End: 2022-07-03

## 2022-07-03 RX ORDER — LIDOCAINE HYDROCHLORIDE 20 MG/ML
10 SOLUTION OROPHARYNGEAL ONCE
Status: COMPLETED | OUTPATIENT
Start: 2022-07-03 | End: 2022-07-03

## 2022-07-03 RX ADMIN — KETOROLAC TROMETHAMINE 15 MG: 15 INJECTION, SOLUTION INTRAMUSCULAR; INTRAVENOUS at 10:07

## 2022-07-03 RX ADMIN — POTASSIUM CHLORIDE 40 MEQ: 20 TABLET, EXTENDED RELEASE ORAL at 10:07

## 2022-07-03 RX ADMIN — LIDOCAINE HYDROCHLORIDE 10 ML: 20 SOLUTION ORAL; TOPICAL at 10:07

## 2022-07-03 RX ADMIN — ALUMINUM HYDROXIDE, MAGNESIUM HYDROXIDE, AND SIMETHICONE 30 ML: 200; 200; 20 SUSPENSION ORAL at 10:07

## 2022-07-04 NOTE — ED PROVIDER NOTES
"Encounter Date: 7/3/2022       History     Chief Complaint   Patient presents with    Back Pain     62 y/o AAF with PMH of HTN and GERD presents with c/o left upper back pain. States when she had a "mild heart attack" before, this is what it felt like. Did not require stents, states they decided to manage medically. She denies shortness of breath. States she has had some nausea but denies vomiting. Daughter reports some diaphoresis. States her pain started on yesterday, she took some medication for acid reflux and it improved but got worse again today. She denies cough, fever or chills, injury or heavy lifting. She does report that certain positions/movements make her back pain worse. She is a daily cigarette smoker, denies alcohol use is a daily marijuana smoker.     The history is provided by the patient and medical records.     Review of patient's allergies indicates:  No Known Allergies  Past Medical History:   Diagnosis Date    GERD (gastroesophageal reflux disease)     Hypertension      Past Surgical History:   Procedure Laterality Date     SECTION      HYSTERECTOMY       Family History   Problem Relation Age of Onset    Hypertension Neg Hx     Diabetes Neg Hx     Cancer Neg Hx      Social History     Tobacco Use    Smoking status: Current Some Day Smoker    Smokeless tobacco: Never Used   Substance Use Topics    Alcohol use: Never    Drug use: Yes     Types: Marijuana     Review of Systems   Constitutional: Positive for diaphoresis. Negative for chills and fever.   HENT: Negative for congestion.    Respiratory: Negative for cough, shortness of breath and wheezing.    Cardiovascular: Negative for chest pain and palpitations.   Gastrointestinal: Positive for nausea. Negative for abdominal pain, constipation, diarrhea and vomiting.   Genitourinary: Negative for dysuria and hematuria.   Musculoskeletal: Positive for back pain.   Neurological: Negative for dizziness, weakness, light-headedness " and headaches.   All other systems reviewed and are negative.      Physical Exam     Initial Vitals [07/03/22 2110]   BP Pulse Resp Temp SpO2   (!) 150/88 78 18 98.5 °F (36.9 °C) 99 %      MAP       --         Physical Exam    Constitutional: She appears well-developed and well-nourished. She is cooperative.   Cardiovascular: Normal rate, regular rhythm, normal heart sounds and normal pulses.   Pulmonary/Chest: Effort normal and breath sounds normal.   Abdominal: Abdomen is soft. Bowel sounds are normal. There is no abdominal tenderness.     Neurological: She is alert and oriented to person, place, and time.   Skin: Skin is warm, dry and intact. Capillary refill takes less than 2 seconds.   Psychiatric: She has a normal mood and affect. Her speech is normal and behavior is normal. Judgment and thought content normal. Cognition and memory are normal.         Medical Screening Exam   See Full Note    ED Course   Procedures  Labs Reviewed   COMPREHENSIVE METABOLIC PANEL - Abnormal; Notable for the following components:       Result Value    Potassium 2.7 (*)     Total Protein 8.4 (*)     Globulin 4.6 (*)     All other components within normal limits   CBC WITH DIFFERENTIAL - Abnormal; Notable for the following components:    RDW 14.8 (*)     Monocytes % 8.4 (*)     All other components within normal limits   LIPASE - Normal   MAGNESIUM - Normal   TROPONIN I - Normal   CBC W/ AUTO DIFFERENTIAL    Narrative:     The following orders were created for panel order CBC auto differential.  Procedure                               Abnormality         Status                     ---------                               -----------         ------                     CBC with Differential[[id number]]        Abnormal            Final result                 Please view results for these tests on the individual orders.          Imaging Results          XR ABDOMEN, ACUTE 2 OR MORE VIEWS WITH CHEST (In process)                   Medications   potassium chloride SA CR tablet 40 mEq (has no administration in time range)   ketorolac injection 15 mg (has no administration in time range)   aluminum-magnesium hydroxide-simethicone 200-200-20 mg/5 mL suspension 30 mL (has no administration in time range)     And   LIDOcaine HCl 2% oral solution 10 mL (has no administration in time range)                     Counseled on supportive measures. Warning s/s discussed and return precautions given; the patient has v/u.    Clinical Impression:   Final diagnoses:  [M54.9] Left-sided back pain  [E87.6] Hypokalemia (Primary)  [K21.9] Gastroesophageal reflux disease, unspecified whether esophagitis present          ED Disposition Condition    Discharge Stable        ED Prescriptions     Medication Sig Dispense Start Date End Date Auth. Provider    potassium chloride (KLOR-CON) 10 MEQ TbSR Take 1 tablet (10 mEq total) by mouth once daily. for 7 days 7 tablet 7/3/2022 7/10/2022 MIKE Benton        Follow-up Information     Follow up With Specialties Details Why Contact Info    Shantanu Larson MD Internal Medicine, Family Medicine In 1 week  [address] [facility] [address]  [phone]             MIKE Benton  07/03/22 2785

## 2022-07-04 NOTE — ED TRIAGE NOTES
Pt in with cc of left upper back pain, pt states its the same has her last heart attack, but it does hurt with movement

## 2022-08-10 ENCOUNTER — ANESTHESIA EVENT (OUTPATIENT)
Dept: GASTROENTEROLOGY | Facility: HOSPITAL | Age: 62
End: 2022-08-10
Payer: COMMERCIAL

## 2022-08-10 ENCOUNTER — ANESTHESIA (OUTPATIENT)
Dept: GASTROENTEROLOGY | Facility: HOSPITAL | Age: 62
End: 2022-08-10
Payer: COMMERCIAL

## 2022-08-10 ENCOUNTER — HOSPITAL ENCOUNTER (OUTPATIENT)
Dept: GASTROENTEROLOGY | Facility: HOSPITAL | Age: 62
Discharge: HOME | End: 2022-08-10
Attending: NURSE PRACTITIONER
Payer: COMMERCIAL

## 2022-08-10 VITALS
TEMPERATURE: 97 F | BODY MASS INDEX: 29.29 KG/M2 | DIASTOLIC BLOOD PRESSURE: 64 MMHG | OXYGEN SATURATION: 100 % | HEART RATE: 65 BPM | SYSTOLIC BLOOD PRESSURE: 96 MMHG | WEIGHT: 145 LBS | RESPIRATION RATE: 22 BRPM

## 2022-08-10 DIAGNOSIS — Z12.11 ENCOUNTER FOR SCREENING COLONOSCOPY: ICD-10-CM

## 2022-08-10 PROCEDURE — D9220A PRA ANESTHESIA: Mod: PT,,, | Performed by: NURSE ANESTHETIST, CERTIFIED REGISTERED

## 2022-08-10 PROCEDURE — 88305 TISSUE EXAM BY PATHOLOGIST: CPT | Mod: SUR | Performed by: STUDENT IN AN ORGANIZED HEALTH CARE EDUCATION/TRAINING PROGRAM

## 2022-08-10 PROCEDURE — 88305 TISSUE EXAM BY PATHOLOGIST: CPT | Mod: 26,,, | Performed by: PATHOLOGY

## 2022-08-10 PROCEDURE — 27201423 OPTIME MED/SURG SUP & DEVICES STERILE SUPPLY

## 2022-08-10 PROCEDURE — 25000003 PHARM REV CODE 250: Performed by: STUDENT IN AN ORGANIZED HEALTH CARE EDUCATION/TRAINING PROGRAM

## 2022-08-10 PROCEDURE — 63600175 PHARM REV CODE 636 W HCPCS: Performed by: NURSE ANESTHETIST, CERTIFIED REGISTERED

## 2022-08-10 PROCEDURE — 37000009 HC ANESTHESIA EA ADD 15 MINS

## 2022-08-10 PROCEDURE — 88305 SURGICAL PATHOLOGY: ICD-10-PCS | Mod: 26,,, | Performed by: PATHOLOGY

## 2022-08-10 PROCEDURE — 45385 COLONOSCOPY W/LESION REMOVAL: CPT

## 2022-08-10 PROCEDURE — 25000003 PHARM REV CODE 250: Performed by: NURSE ANESTHETIST, CERTIFIED REGISTERED

## 2022-08-10 PROCEDURE — 37000008 HC ANESTHESIA 1ST 15 MINUTES

## 2022-08-10 PROCEDURE — D9220A PRA ANESTHESIA: ICD-10-PCS | Mod: PT,,, | Performed by: NURSE ANESTHETIST, CERTIFIED REGISTERED

## 2022-08-10 PROCEDURE — 45385 COLONOSCOPY W/LESION REMOVAL: CPT | Mod: 33,,, | Performed by: STUDENT IN AN ORGANIZED HEALTH CARE EDUCATION/TRAINING PROGRAM

## 2022-08-10 PROCEDURE — 45385 PR COLONOSCOPY,REMV LESN,SNARE: ICD-10-PCS | Mod: 33,,, | Performed by: STUDENT IN AN ORGANIZED HEALTH CARE EDUCATION/TRAINING PROGRAM

## 2022-08-10 RX ORDER — LIDOCAINE HYDROCHLORIDE 20 MG/ML
INJECTION, SOLUTION EPIDURAL; INFILTRATION; INTRACAUDAL; PERINEURAL
Status: DISCONTINUED | OUTPATIENT
Start: 2022-08-10 | End: 2022-08-10

## 2022-08-10 RX ORDER — PROCHLORPERAZINE EDISYLATE 5 MG/ML
5 INJECTION INTRAMUSCULAR; INTRAVENOUS ONCE AS NEEDED
Status: DISCONTINUED | OUTPATIENT
Start: 2022-08-10 | End: 2022-08-11 | Stop reason: HOSPADM

## 2022-08-10 RX ORDER — SODIUM CHLORIDE 0.9 % (FLUSH) 0.9 %
10 SYRINGE (ML) INJECTION
Status: DISCONTINUED | OUTPATIENT
Start: 2022-08-10 | End: 2022-08-11 | Stop reason: HOSPADM

## 2022-08-10 RX ORDER — SODIUM CHLORIDE 9 MG/ML
INJECTION, SOLUTION INTRAVENOUS CONTINUOUS
Status: DISCONTINUED | OUTPATIENT
Start: 2022-08-10 | End: 2022-08-11 | Stop reason: HOSPADM

## 2022-08-10 RX ORDER — PROPOFOL 10 MG/ML
VIAL (ML) INTRAVENOUS
Status: DISCONTINUED | OUTPATIENT
Start: 2022-08-10 | End: 2022-08-10

## 2022-08-10 RX ORDER — ONDANSETRON 2 MG/ML
4 INJECTION INTRAMUSCULAR; INTRAVENOUS ONCE AS NEEDED
Status: DISCONTINUED | OUTPATIENT
Start: 2022-08-10 | End: 2022-08-11 | Stop reason: HOSPADM

## 2022-08-10 RX ADMIN — LIDOCAINE HYDROCHLORIDE 100 MG: 20 INJECTION, SOLUTION INTRAVENOUS at 09:08

## 2022-08-10 RX ADMIN — PROPOFOL 50 MG: 10 INJECTION, EMULSION INTRAVENOUS at 09:08

## 2022-08-10 RX ADMIN — PROPOFOL 100 MG: 10 INJECTION, EMULSION INTRAVENOUS at 09:08

## 2022-08-10 RX ADMIN — SODIUM CHLORIDE: 9 INJECTION, SOLUTION INTRAVENOUS at 09:08

## 2022-08-10 NOTE — H&P
History of Present Illness    Faviola King is a 62 y.o. female that  has a past medical history of GERD (gastroesophageal reflux disease) and Hypertension.     Follows in GI clinic for reflux.  Colonoscopy for CRC screening. Denies any prior colonoscopy.  On Protonix   Labs show Hgb 14.9, MCV, Plat wnl.    Patient otherwise denies any:  - black stools  - bloody stools  - nausea  - vomiting  - diarrhea  - constipation  - abdominal pain  - family history of GI related malignancies    ROS  - 12 point review of systems is negative except as otherwise stated in HPI.    Past Medical History:   Diagnosis Date    GERD (gastroesophageal reflux disease)     Hypertension        Past Surgical History:   Procedure Laterality Date     SECTION      HYSTERECTOMY         Family History   Problem Relation Age of Onset    Hypertension Neg Hx     Diabetes Neg Hx     Cancer Neg Hx        Social History     Socioeconomic History    Marital status: Single   Tobacco Use    Smoking status: Current Some Day Smoker    Smokeless tobacco: Never Used   Substance and Sexual Activity    Alcohol use: Never    Drug use: Yes     Types: Marijuana       Current Outpatient Medications   Medication Sig Dispense Refill    ALPRAZolam (XANAX) 0.25 MG tablet Take 1 tablet (0.25 mg total) by mouth nightly as needed for Anxiety. 30 tablet 1    amLODIPine (NORVASC) 10 MG tablet Take 1 tablet (10 mg total) by mouth once daily. 90 tablet 1    amoxicillin-clavulanate 875-125mg (AUGMENTIN) 875-125 mg per tablet Take 1 tablet by mouth 2 (two) times daily. (Patient not taking: Reported on 6/15/2022) 20 tablet 0    carvediloL (COREG) 6.25 MG tablet Take 1 tablet (6.25 mg total) by mouth 2 (two) times a day. 180 tablet 1    lisinopriL (PRINIVIL,ZESTRIL) 20 MG tablet Take 1 tablet (20 mg total) by mouth once daily. 90 tablet 1    ondansetron (ZOFRAN-ODT) 4 MG TbDL Take 2 tablets (8 mg total) by mouth 2 (two) times daily. (Patient not taking:  Reported on 6/15/2022) 20 tablet 0    pantoprazole (PROTONIX) 40 MG tablet Take 1 tablet (40 mg total) by mouth once daily. 90 tablet 3     No current facility-administered medications for this encounter.       Review of patient's allergies indicates:  No Known Allergies    Objective:  There were no vitals filed for this visit.     Constitutional:       General: no acute distress.     Appearance: Normal appearance. Non toxic-appearing.   HENT:      Head: Normocephalic and atraumatic.      Mouth/Throat:      Pharynx: Oropharynx is clear. No posterior oropharyngeal erythema.   Eyes:      General: No scleral icterus.     Conjunctiva/sclera: Conjunctivae normal.   Cardiovascular:      Rate and Rhythm: Normal rate and regular rhythm.      Heart sounds: Normal heart sounds.   Pulmonary:      Effort: Pulmonary effort is normal.      Breath sounds: Normal breath sounds.   Abdominal:      General: Abdomen is flat. There is no distension.      Palpations: Abdomen is soft. There is no mass.      Tenderness: There is no abdominal tenderness. There is no guarding.   Musculoskeletal:         General: No swelling or deformity.      Cervical back: Normal range of motion and neck supple.   Skin:     General: Skin is warm and dry.   Neurological:      General: No focal deficit present.      Mental Status: alert and oriented to person, place, and time.     Assessment and Plan:  Proceed with:  Colonoscopy for screening for colon cancer    Domenico Vargas MD  Gastroenterology

## 2022-08-10 NOTE — TRANSFER OF CARE
Anesthesia Transfer of Care Note    Patient: Faviola King    Procedure(s) Performed: * No procedures listed *    Patient location: PACU    Anesthesia Type: general    Transport from OR: Transported from OR on room air with adequate spontaneous ventilation    Post pain: adequate analgesia    Post assessment: no apparent anesthetic complications    Post vital signs: stable    Level of consciousness: sedated    Nausea/Vomiting: no nausea/vomiting    Complications: none    Transfer of care protocol was followed      Last vitals:   Visit Vitals  BP 96/64   Pulse 65   Temp 36.1 °C (97 °F)   Resp (!) 22   Wt 65.8 kg (145 lb)   SpO2 100%   Breastfeeding No   BMI 29.29 kg/m²

## 2022-08-10 NOTE — DISCHARGE INSTRUCTIONS
Procedure Date  8/10/22     Impression  Overall Impression: 3 small colon polyps removed.     Recommendation  Await pathology results  Repeat colonoscopy in 3 years

## 2022-08-10 NOTE — ANESTHESIA POSTPROCEDURE EVALUATION
Anesthesia Post Evaluation    Patient: Faviola King    Procedure(s) Performed: * No procedures listed *    Final Anesthesia Type: general      Patient location during evaluation: PACU  Patient participation: Yes- Able to Participate  Level of consciousness: awake and alert  Post-procedure vital signs: reviewed and stable  Pain management: adequate  Airway patency: patent    PONV status at discharge: No PONV  Anesthetic complications: no      Cardiovascular status: blood pressure returned to baseline  Respiratory status: unassisted  Hydration status: euvolemic  Follow-up not needed.          Vitals Value Taken Time   /93 08/10/22 1018   Temp 36.1 °C (97 °F) 08/10/22 1005   Pulse 67 08/10/22 1019   Resp 18 08/10/22 1019   SpO2 98 % 08/10/22 1019   Vitals shown include unvalidated device data.      No case tracking events are documented in the log.      Pain/Jerry Score: Jerry Score: 8 (8/10/2022 10:00 AM)

## 2022-08-10 NOTE — ANESTHESIA PREPROCEDURE EVALUATION
08/10/2022  Faviola King is a 62 y.o., female.      Pre-op Assessment    I have reviewed the Patient Summary Reports.    I have reviewed the NPO Status.   I have reviewed the Medications.     Review of Systems  Social:  Smoker    Cardiovascular:   Hypertension Past MI     Hepatic/GI:   GERD    Psych:   anxiety          Physical Exam  General: Well nourished, Cooperative and Alert    Airway:  Mallampati: II   Mouth Opening: Normal  TM Distance: Normal  Tongue: Normal  Neck ROM: Normal ROM        Anesthesia Plan  Type of Anesthesia, risks & benefits discussed:    Anesthesia Type: Gen Natural Airway  Intra-op Monitoring Plan: Standard ASA Monitors  Post Op Pain Control Plan: multimodal analgesia  Induction:  IV  Informed Consent: Informed consent signed with the Patient and all parties understand the risks and agree with anesthesia plan.  All questions answered.   ASA Score: 3  Day of Surgery Review of History & Physical: H&P Update referred to the surgeon/provider.    Ready For Surgery From Anesthesia Perspective.     .

## 2022-08-11 LAB
ESTROGEN SERPL-MCNC: NORMAL PG/ML
INSULIN SERPL-ACNC: NORMAL U[IU]/ML
LAB AP GROSS DESCRIPTION: NORMAL
LAB AP LABORATORY NOTES: NORMAL
T3RU NFR SERPL: NORMAL %

## 2022-08-12 ENCOUNTER — TELEPHONE (OUTPATIENT)
Dept: FAMILY MEDICINE | Facility: CLINIC | Age: 62
End: 2022-08-12
Payer: COMMERCIAL

## 2022-08-12 NOTE — TELEPHONE ENCOUNTER
----- Message from Shantanu Larson MD sent at 8/11/2022  1:35 PM CDT -----  Need to see in  1 week please  abnl results         Called patient re: above message. Appointment scheduled

## 2022-08-24 ENCOUNTER — OFFICE VISIT (OUTPATIENT)
Dept: FAMILY MEDICINE | Facility: CLINIC | Age: 62
End: 2022-08-24
Payer: COMMERCIAL

## 2022-08-24 VITALS
HEIGHT: 59 IN | WEIGHT: 141 LBS | BODY MASS INDEX: 28.43 KG/M2 | SYSTOLIC BLOOD PRESSURE: 140 MMHG | OXYGEN SATURATION: 96 % | HEART RATE: 86 BPM | RESPIRATION RATE: 18 BRPM | DIASTOLIC BLOOD PRESSURE: 88 MMHG | TEMPERATURE: 98 F

## 2022-08-24 DIAGNOSIS — E87.6 HYPOKALEMIA: Primary | ICD-10-CM

## 2022-08-24 DIAGNOSIS — I10 PRIMARY HYPERTENSION: ICD-10-CM

## 2022-08-24 DIAGNOSIS — K63.5 POLYP OF COLON, UNSPECIFIED PART OF COLON, UNSPECIFIED TYPE: ICD-10-CM

## 2022-08-24 DIAGNOSIS — F17.200 TOBACCO DEPENDENCY: ICD-10-CM

## 2022-08-24 PROCEDURE — 1159F PR MEDICATION LIST DOCUMENTED IN MEDICAL RECORD: ICD-10-PCS | Mod: CPTII,,, | Performed by: INTERNAL MEDICINE

## 2022-08-24 PROCEDURE — 1159F MED LIST DOCD IN RCRD: CPT | Mod: CPTII,,, | Performed by: INTERNAL MEDICINE

## 2022-08-24 PROCEDURE — 1160F RVW MEDS BY RX/DR IN RCRD: CPT | Mod: CPTII,,, | Performed by: INTERNAL MEDICINE

## 2022-08-24 PROCEDURE — 3079F PR MOST RECENT DIASTOLIC BLOOD PRESSURE 80-89 MM HG: ICD-10-PCS | Mod: CPTII,,, | Performed by: INTERNAL MEDICINE

## 2022-08-24 PROCEDURE — 3079F DIAST BP 80-89 MM HG: CPT | Mod: CPTII,,, | Performed by: INTERNAL MEDICINE

## 2022-08-24 PROCEDURE — 99213 OFFICE O/P EST LOW 20 MIN: CPT | Mod: ,,, | Performed by: INTERNAL MEDICINE

## 2022-08-24 PROCEDURE — 4010F PR ACE/ARB THEARPY RXD/TAKEN: ICD-10-PCS | Mod: CPTII,,, | Performed by: INTERNAL MEDICINE

## 2022-08-24 PROCEDURE — 99213 PR OFFICE/OUTPT VISIT, EST, LEVL III, 20-29 MIN: ICD-10-PCS | Mod: ,,, | Performed by: INTERNAL MEDICINE

## 2022-08-24 PROCEDURE — 3077F PR MOST RECENT SYSTOLIC BLOOD PRESSURE >= 140 MM HG: ICD-10-PCS | Mod: CPTII,,, | Performed by: INTERNAL MEDICINE

## 2022-08-24 PROCEDURE — 4010F ACE/ARB THERAPY RXD/TAKEN: CPT | Mod: CPTII,,, | Performed by: INTERNAL MEDICINE

## 2022-08-24 PROCEDURE — 3008F PR BODY MASS INDEX (BMI) DOCUMENTED: ICD-10-PCS | Mod: CPTII,,, | Performed by: INTERNAL MEDICINE

## 2022-08-24 PROCEDURE — 3008F BODY MASS INDEX DOCD: CPT | Mod: CPTII,,, | Performed by: INTERNAL MEDICINE

## 2022-08-24 PROCEDURE — 3077F SYST BP >= 140 MM HG: CPT | Mod: CPTII,,, | Performed by: INTERNAL MEDICINE

## 2022-08-24 PROCEDURE — 1160F PR REVIEW ALL MEDS BY PRESCRIBER/CLIN PHARMACIST DOCUMENTED: ICD-10-PCS | Mod: CPTII,,, | Performed by: INTERNAL MEDICINE

## 2022-08-24 RX ORDER — IBUPROFEN 200 MG
1 TABLET ORAL DAILY
Qty: 30 PATCH | Refills: 1 | Status: SHIPPED | OUTPATIENT
Start: 2022-08-24

## 2022-08-25 ENCOUNTER — LAB VISIT (OUTPATIENT)
Dept: LAB | Facility: CLINIC | Age: 62
End: 2022-08-25
Payer: COMMERCIAL

## 2022-08-25 DIAGNOSIS — E87.6 HYPOKALEMIA: ICD-10-CM

## 2022-08-25 LAB
ANION GAP SERPL CALCULATED.3IONS-SCNC: 12 MMOL/L (ref 7–16)
BUN SERPL-MCNC: 13 MG/DL (ref 7–18)
BUN/CREAT SERPL: 16 (ref 6–20)
CALCIUM SERPL-MCNC: 9.7 MG/DL (ref 8.5–10.1)
CHLORIDE SERPL-SCNC: 106 MMOL/L (ref 98–107)
CO2 SERPL-SCNC: 28 MMOL/L (ref 21–32)
CREAT SERPL-MCNC: 0.82 MG/DL (ref 0.55–1.02)
EGFR (NO RACE VARIABLE) (RUSH/TITUS): 80 ML/MIN/1.73M²
GLUCOSE SERPL-MCNC: 90 MG/DL (ref 74–106)
POTASSIUM SERPL-SCNC: 2.9 MMOL/L (ref 3.5–5.1)
SODIUM SERPL-SCNC: 143 MMOL/L (ref 136–145)

## 2022-08-25 PROCEDURE — 80048 BASIC METABOLIC PNL TOTAL CA: CPT | Mod: ,,, | Performed by: CLINICAL MEDICAL LABORATORY

## 2022-08-25 PROCEDURE — 80048 BASIC METABOLIC PANEL: ICD-10-PCS | Mod: ,,, | Performed by: CLINICAL MEDICAL LABORATORY

## 2022-08-25 NOTE — PROGRESS NOTES
Subjective:       Patient ID: Faviola King is a 62 y.o. female.    Chief Complaint: Results (C-scope/Patient went to ER 7-3, potassium was low, did not start potassium because she wanted to ask Dr.. Larson was it ok to take)    Patient is here today for check up evaluation. Patient recently underwent Colonoscopy. 3 polyps removed and pathology shows non cancerous. Patient states in July she went to the ER and was found to have low K+ and was given potassium pills. States she has run out of the medication. Will repeat K+ level. Patient is also an active smoker. Discussed tobacco cessation. Will start Nicotine Patch 14 mg QD. Will follow in 2 months.       Current Medications:    Current Outpatient Medications:     ALPRAZolam (XANAX) 0.25 MG tablet, Take 1 tablet (0.25 mg total) by mouth nightly as needed for Anxiety., Disp: 30 tablet, Rfl: 1    amLODIPine (NORVASC) 10 MG tablet, Take 1 tablet (10 mg total) by mouth once daily., Disp: 90 tablet, Rfl: 1    amoxicillin-clavulanate 875-125mg (AUGMENTIN) 875-125 mg per tablet, Take 1 tablet by mouth 2 (two) times daily. (Patient not taking: Reported on 6/15/2022), Disp: 20 tablet, Rfl: 0    carvediloL (COREG) 6.25 MG tablet, Take 1 tablet (6.25 mg total) by mouth 2 (two) times a day., Disp: 180 tablet, Rfl: 1    lisinopriL (PRINIVIL,ZESTRIL) 20 MG tablet, Take 1 tablet (20 mg total) by mouth once daily., Disp: 90 tablet, Rfl: 1    nicotine (NICODERM CQ) 14 mg/24 hr, Place 1 patch onto the skin once daily., Disp: 30 patch, Rfl: 1    ondansetron (ZOFRAN-ODT) 4 MG TbDL, Take 2 tablets (8 mg total) by mouth 2 (two) times daily. (Patient not taking: Reported on 6/15/2022), Disp: 20 tablet, Rfl: 0    pantoprazole (PROTONIX) 40 MG tablet, Take 1 tablet (40 mg total) by mouth once daily., Disp: 90 tablet, Rfl: 3    Last Labs:     Hospital Outpatient Visit on 08/10/2022   Component Date Value    Case Report 08/10/2022                      Value:Surgical Pathology                                 Case: [id number]                                   Authorizing Provider:  Antoine Vargas MD         Collected:           08/10/2022 09:49 AM          Ordering Location:     [facility] - Endoscopy       Received:            08/10/2022 10:50 AM          Pathologist:           Ervin Choe MD                                                       Specimen:    Intestine Large, Descending Colon, A- Descending Colon Polyp (3)                           Final Diagnosis 08/10/2022                      Value:This result contains rich text formatting which cannot be displayed here.    Gross Description 08/10/2022                      Value:This result contains rich text formatting which cannot be displayed here.    Microscopic Description 08/10/2022                      Value:This result contains rich text formatting which cannot be displayed here.    Laboratory Notes 08/10/2022                      Value:This result contains rich text formatting which cannot be displayed here.       Last Imaging:  Colonoscopy  Narrative: Procedure Date  8/10/22    Impression  Overall   Impression:  3 small colon polyps removed.    Recommendation  Await pathology results  Repeat colonoscopy in 3 years    Indication  Encounter for screening colonoscopy    Providers  Attending: Antoine Vargas MD  Fellow:    Other Staff: Trav Culp RN, Francis Morris CRNA    Medications  Moderate sedation administered by anesthesia staff - See anesthesia   record.    Preprocedure  A history and physical has been performed, and patient medication   allergies have been reviewed. The patient's tolerance of previous   anesthesia has been reviewed. The risks and benefits of the procedure and   the sedation options and risks were discussed with the patient. All   questions were answered and informed consent obtained.    ASA Score: ASA 2 - Patient with mild systemic disease with no functional   limitations  Mallampati Airway Score: III  (soft and hard palate and base of uvula   visible)    Details of the Procedure  The patient underwent monitored anesthesia care, which was administered by   an anesthesia professional. The patient's heart rate, blood pressure,   level of consciousness, respirations, oxygen, ECG and ETCO2 were monitored   throughout the procedure. A digital rectal exam was performed. The scope   was introduced through the anus and advanced to the terminal ileum.   Retroflexion was not performed due to narrow vault. The quality of bowel   preparation was evaluated using the [place] Bowel Preparation Scale with   scores of: right colon = 3, transverse colon = 3, left colon = 3. The   total BBPS score was 9. Bowel prep was adequate. The patient's estimated   blood loss was minimal (<5 mL). The procedure was not difficult. The   patient tolerated the procedure well. There were no apparent   complications.     Scope: Colonoscope  Scope Serial: [id number]    Events  Procedure Events   Event Event Time     Procedure Events   Event Event Time     CECAL WITHDRAWAL TIME:     Findings  Three sessile polyps measuring 5-9 mm in the sigmoid colon; completely   removed en bloc by cold snare and retrieved specimen         Review of Systems   All other systems reviewed and are negative.        Objective:      Physical Exam  Vitals reviewed.   Constitutional:       Appearance: Normal appearance.   Cardiovascular:      Rate and Rhythm: Normal rate and regular rhythm.      Pulses: Normal pulses.      Heart sounds: Normal heart sounds.   Pulmonary:      Effort: Pulmonary effort is normal.      Breath sounds: Normal breath sounds.   Abdominal:      General: Abdomen is flat. Bowel sounds are normal.      Palpations: Abdomen is soft.   Musculoskeletal:         General: Normal range of motion.      Cervical back: Normal range of motion and neck supple.   Skin:     General: Skin is warm and dry.   Neurological:      General: No focal deficit present.      Mental  Status: She is alert and oriented to person, place, and time. Mental status is at baseline.         Assessment:       1. Hypokalemia  Basic Metabolic Panel   2. Polyp of colon, unspecified part of colon, unspecified type     3. Tobacco dependency          Plan:         Faviola was seen today for results.    Diagnoses and all orders for this visit:    Hypokalemia  -     Basic Metabolic Panel; Future    Polyp of colon, unspecified part of colon, unspecified type    Tobacco dependency    Other orders  -     nicotine (NICODERM CQ) 14 mg/24 hr; Place 1 patch onto the skin once daily.

## 2022-08-26 ENCOUNTER — TELEPHONE (OUTPATIENT)
Dept: FAMILY MEDICINE | Facility: CLINIC | Age: 62
End: 2022-08-26
Payer: COMMERCIAL

## 2022-08-26 NOTE — TELEPHONE ENCOUNTER
----- Message from Shantanu Larson MD sent at 8/25/2022  4:14 PM CDT -----  Need to see  Monday please  abnl results         Called patient re: above message. Appointment made for monday

## 2022-08-29 ENCOUNTER — OFFICE VISIT (OUTPATIENT)
Dept: FAMILY MEDICINE | Facility: CLINIC | Age: 62
End: 2022-08-29
Payer: COMMERCIAL

## 2022-08-29 VITALS
HEART RATE: 83 BPM | DIASTOLIC BLOOD PRESSURE: 82 MMHG | SYSTOLIC BLOOD PRESSURE: 136 MMHG | BODY MASS INDEX: 28.22 KG/M2 | HEIGHT: 59 IN | TEMPERATURE: 98 F | OXYGEN SATURATION: 97 % | RESPIRATION RATE: 18 BRPM | WEIGHT: 140 LBS

## 2022-08-29 DIAGNOSIS — E87.6 HYPOKALEMIA: Primary | ICD-10-CM

## 2022-08-29 PROBLEM — Z00.00 ENCOUNTER FOR GENERAL ADULT MEDICAL EXAMINATION WITHOUT ABNORMAL FINDINGS: Status: RESOLVED | Noted: 2022-05-24 | Resolved: 2022-08-29

## 2022-08-29 PROCEDURE — 3079F DIAST BP 80-89 MM HG: CPT | Mod: CPTII,,, | Performed by: INTERNAL MEDICINE

## 2022-08-29 PROCEDURE — 3008F PR BODY MASS INDEX (BMI) DOCUMENTED: ICD-10-PCS | Mod: CPTII,,, | Performed by: INTERNAL MEDICINE

## 2022-08-29 PROCEDURE — 1159F MED LIST DOCD IN RCRD: CPT | Mod: CPTII,,, | Performed by: INTERNAL MEDICINE

## 2022-08-29 PROCEDURE — 99213 OFFICE O/P EST LOW 20 MIN: CPT | Mod: ,,, | Performed by: INTERNAL MEDICINE

## 2022-08-29 PROCEDURE — 1160F RVW MEDS BY RX/DR IN RCRD: CPT | Mod: CPTII,,, | Performed by: INTERNAL MEDICINE

## 2022-08-29 PROCEDURE — 3075F SYST BP GE 130 - 139MM HG: CPT | Mod: CPTII,,, | Performed by: INTERNAL MEDICINE

## 2022-08-29 PROCEDURE — 4010F ACE/ARB THERAPY RXD/TAKEN: CPT | Mod: CPTII,,, | Performed by: INTERNAL MEDICINE

## 2022-08-29 PROCEDURE — 4010F PR ACE/ARB THEARPY RXD/TAKEN: ICD-10-PCS | Mod: CPTII,,, | Performed by: INTERNAL MEDICINE

## 2022-08-29 PROCEDURE — 99213 PR OFFICE/OUTPT VISIT, EST, LEVL III, 20-29 MIN: ICD-10-PCS | Mod: ,,, | Performed by: INTERNAL MEDICINE

## 2022-08-29 PROCEDURE — 3008F BODY MASS INDEX DOCD: CPT | Mod: CPTII,,, | Performed by: INTERNAL MEDICINE

## 2022-08-29 PROCEDURE — 1159F PR MEDICATION LIST DOCUMENTED IN MEDICAL RECORD: ICD-10-PCS | Mod: CPTII,,, | Performed by: INTERNAL MEDICINE

## 2022-08-29 PROCEDURE — 1160F PR REVIEW ALL MEDS BY PRESCRIBER/CLIN PHARMACIST DOCUMENTED: ICD-10-PCS | Mod: CPTII,,, | Performed by: INTERNAL MEDICINE

## 2022-08-29 PROCEDURE — 3075F PR MOST RECENT SYSTOLIC BLOOD PRESS GE 130-139MM HG: ICD-10-PCS | Mod: CPTII,,, | Performed by: INTERNAL MEDICINE

## 2022-08-29 PROCEDURE — 3079F PR MOST RECENT DIASTOLIC BLOOD PRESSURE 80-89 MM HG: ICD-10-PCS | Mod: CPTII,,, | Performed by: INTERNAL MEDICINE

## 2022-08-29 NOTE — PATIENT INSTRUCTIONS
Faviola was seen today for results, hypertension, gastroesophageal reflux and medication refill.    Diagnoses and all orders for this visit:    Hypokalemia  -     Potassium; Future    Other orders  The following orders have not been finalized:  -     amLODIPine (NORVASC) 10 MG tablet  -     carvediloL (COREG) 6.25 MG tablet  -     pantoprazole (PROTONIX) 40 MG tablet  -     potassium chloride SA (K-DUR,KLOR-CON) 20 MEQ tablet

## 2022-08-29 NOTE — PROGRESS NOTES
Subjective:       Patient ID: Faviola King is a 62 y.o. female.    Chief Complaint: Results (lab), Hypertension, Gastroesophageal Reflux, and Medication Refill    Patient is here today for a follow up evaluation. Patient blood pressure is stable today on intake, 136/82. Patient has no new complaints. Recent labs reviewed, results show abnormal Potassium. Will order Kcl 20 mEq PO TID x 2 days RF1. Will follow in 3 months.     Current Medications:    Current Outpatient Medications:     ALPRAZolam (XANAX) 0.25 MG tablet, Take 1 tablet (0.25 mg total) by mouth nightly as needed for Anxiety., Disp: 30 tablet, Rfl: 1    amLODIPine (NORVASC) 10 MG tablet, Take 1 tablet (10 mg total) by mouth once daily., Disp: 90 tablet, Rfl: 1    carvediloL (COREG) 6.25 MG tablet, Take 1 tablet (6.25 mg total) by mouth 2 (two) times a day., Disp: 180 tablet, Rfl: 1    nicotine (NICODERM CQ) 14 mg/24 hr, Place 1 patch onto the skin once daily., Disp: 30 patch, Rfl: 1    ondansetron (ZOFRAN-ODT) 4 MG TbDL, Take 2 tablets (8 mg total) by mouth 2 (two) times daily., Disp: 20 tablet, Rfl: 0    pantoprazole (PROTONIX) 40 MG tablet, Take 1 tablet (40 mg total) by mouth once daily., Disp: 90 tablet, Rfl: 3    amoxicillin-clavulanate 875-125mg (AUGMENTIN) 875-125 mg per tablet, Take 1 tablet by mouth 2 (two) times daily. (Patient not taking: Reported on 8/29/2022), Disp: 20 tablet, Rfl: 0    lisinopriL (PRINIVIL,ZESTRIL) 20 MG tablet, Take 1 tablet (20 mg total) by mouth once daily. (Patient not taking: Reported on 8/29/2022), Disp: 90 tablet, Rfl: 1    Last Labs:     Lab Visit on 08/25/2022   Component Date Value    Sodium 08/25/2022 143     Potassium 08/25/2022 2.9 (L)     Chloride 08/25/2022 106     CO2 08/25/2022 28     Anion Gap 08/25/2022 12     Glucose 08/25/2022 90     BUN 08/25/2022 13     Creatinine 08/25/2022 0.82     BUN/Creatinine Ratio 08/25/2022 16     Calcium 08/25/2022 9.7     eGFR 08/25/2022 80    Hospital Outpatient Visit on  08/10/2022   Component Date Value    Case Report 08/10/2022                      Value:Surgical Pathology                                Case: [id number]                                   Authorizing Provider:  Antoine Vargas MD         Collected:           08/10/2022 09:49 AM          Ordering Location:     [facility] - Endoscopy       Received:            08/10/2022 10:50 AM          Pathologist:           Ervin Choe MD                                                       Specimen:    Intestine Large, Descending Colon, A- Descending Colon Polyp (3)                           Final Diagnosis 08/10/2022                      Value:This result contains rich text formatting which cannot be displayed here.    Gross Description 08/10/2022                      Value:This result contains rich text formatting which cannot be displayed here.    Microscopic Description 08/10/2022                      Value:This result contains rich text formatting which cannot be displayed here.    Laboratory Notes 08/10/2022                      Value:This result contains rich text formatting which cannot be displayed here.       Last Imaging:  Colonoscopy  Narrative: Procedure Date  8/10/22    Impression  Overall   Impression:  3 small colon polyps removed.    Recommendation  Await pathology results  Repeat colonoscopy in 3 years    Indication  Encounter for screening colonoscopy    Providers  Attending: Antoine Vargas MD  Fellow:    Other Staff: Trav Culp RN, Francis Morris CRNA    Medications  Moderate sedation administered by anesthesia staff - See anesthesia   record.    Preprocedure  A history and physical has been performed, and patient medication   allergies have been reviewed. The patient's tolerance of previous   anesthesia has been reviewed. The risks and benefits of the procedure and   the sedation options and risks were discussed with the patient. All   questions were answered and informed consent obtained.    ASA  Score: ASA 2 - Patient with mild systemic disease with no functional   limitations  Mallampati Airway Score: III (soft and hard palate and base of uvula   visible)    Details of the Procedure  The patient underwent monitored anesthesia care, which was administered by   an anesthesia professional. The patient's heart rate, blood pressure,   level of consciousness, respirations, oxygen, ECG and ETCO2 were monitored   throughout the procedure. A digital rectal exam was performed. The scope   was introduced through the anus and advanced to the terminal ileum.   Retroflexion was not performed due to narrow vault. The quality of bowel   preparation was evaluated using the [place] Bowel Preparation Scale with   scores of: right colon = 3, transverse colon = 3, left colon = 3. The   total BBPS score was 9. Bowel prep was adequate. The patient's estimated   blood loss was minimal (<5 mL). The procedure was not difficult. The   patient tolerated the procedure well. There were no apparent   complications.     Scope: Colonoscope  Scope Serial: [id number]    Events  Procedure Events   Event Event Time     Procedure Events   Event Event Time     CECAL WITHDRAWAL TIME:     Findings  Three sessile polyps measuring 5-9 mm in the sigmoid colon; completely   removed en bloc by cold snare and retrieved specimen         Review of Systems   All other systems reviewed and are negative.      Objective:      Physical Exam  Vitals reviewed.   Constitutional:       Appearance: Normal appearance. She is normal weight.   Cardiovascular:      Rate and Rhythm: Normal rate and regular rhythm.      Pulses: Normal pulses.      Heart sounds: Normal heart sounds.   Pulmonary:      Effort: Pulmonary effort is normal.      Breath sounds: Normal breath sounds.   Abdominal:      General: Abdomen is flat. Bowel sounds are normal.      Palpations: Abdomen is soft.   Musculoskeletal:         General: Normal range of motion.      Cervical back: Normal range of  motion and neck supple.   Skin:     General: Skin is warm and dry.   Neurological:      General: No focal deficit present.      Mental Status: She is alert and oriented to person, place, and time. Mental status is at baseline.       Assessment:       1. Hypokalemia  Potassium           Plan:         Faviola was seen today for results, hypertension, gastroesophageal reflux and medication refill.    Diagnoses and all orders for this visit:    Hypokalemia  -     Potassium; Future    Other orders  The following orders have not been finalized:  -     amLODIPine (NORVASC) 10 MG tablet  -     carvediloL (COREG) 6.25 MG tablet  -     pantoprazole (PROTONIX) 40 MG tablet  -     potassium chloride SA (K-DUR,KLOR-CON) 20 MEQ tablet

## 2022-08-30 RX ORDER — POTASSIUM CHLORIDE 20 MEQ/1
20 TABLET, EXTENDED RELEASE ORAL 3 TIMES DAILY
Qty: 6 TABLET | Refills: 1 | Status: SHIPPED | OUTPATIENT
Start: 2022-08-30 | End: 2023-05-25 | Stop reason: SDUPTHER

## 2022-08-30 RX ORDER — PANTOPRAZOLE SODIUM 40 MG/1
40 TABLET, DELAYED RELEASE ORAL DAILY
Qty: 90 TABLET | Refills: 1 | Status: SHIPPED | OUTPATIENT
Start: 2022-08-30 | End: 2023-05-25 | Stop reason: SDUPTHER

## 2022-08-30 RX ORDER — AMLODIPINE BESYLATE 10 MG/1
10 TABLET ORAL DAILY
Qty: 90 TABLET | Refills: 1 | Status: SHIPPED | OUTPATIENT
Start: 2022-08-30 | End: 2023-05-25 | Stop reason: SDUPTHER

## 2022-08-30 RX ORDER — CARVEDILOL 6.25 MG/1
6.25 TABLET ORAL 2 TIMES DAILY
Qty: 180 TABLET | Refills: 1 | Status: SHIPPED | OUTPATIENT
Start: 2022-08-30 | End: 2023-02-24

## 2022-09-22 ENCOUNTER — OFFICE VISIT (OUTPATIENT)
Dept: GASTROENTEROLOGY | Facility: CLINIC | Age: 62
End: 2022-09-22
Payer: COMMERCIAL

## 2022-09-22 VITALS
SYSTOLIC BLOOD PRESSURE: 135 MMHG | DIASTOLIC BLOOD PRESSURE: 97 MMHG | HEIGHT: 59 IN | HEART RATE: 86 BPM | BODY MASS INDEX: 27.82 KG/M2 | WEIGHT: 138 LBS | OXYGEN SATURATION: 98 %

## 2022-09-22 DIAGNOSIS — K21.9 GASTROESOPHAGEAL REFLUX DISEASE, UNSPECIFIED WHETHER ESOPHAGITIS PRESENT: ICD-10-CM

## 2022-09-22 DIAGNOSIS — R63.4 WEIGHT LOSS: Primary | ICD-10-CM

## 2022-09-22 PROCEDURE — 3008F PR BODY MASS INDEX (BMI) DOCUMENTED: ICD-10-PCS | Mod: CPTII,,, | Performed by: NURSE PRACTITIONER

## 2022-09-22 PROCEDURE — 3075F PR MOST RECENT SYSTOLIC BLOOD PRESS GE 130-139MM HG: ICD-10-PCS | Mod: CPTII,,, | Performed by: NURSE PRACTITIONER

## 2022-09-22 PROCEDURE — 4010F ACE/ARB THERAPY RXD/TAKEN: CPT | Mod: CPTII,,, | Performed by: NURSE PRACTITIONER

## 2022-09-22 PROCEDURE — 1159F MED LIST DOCD IN RCRD: CPT | Mod: CPTII,,, | Performed by: NURSE PRACTITIONER

## 2022-09-22 PROCEDURE — 3080F PR MOST RECENT DIASTOLIC BLOOD PRESSURE >= 90 MM HG: ICD-10-PCS | Mod: CPTII,,, | Performed by: NURSE PRACTITIONER

## 2022-09-22 PROCEDURE — 1159F PR MEDICATION LIST DOCUMENTED IN MEDICAL RECORD: ICD-10-PCS | Mod: CPTII,,, | Performed by: NURSE PRACTITIONER

## 2022-09-22 PROCEDURE — 3008F BODY MASS INDEX DOCD: CPT | Mod: CPTII,,, | Performed by: NURSE PRACTITIONER

## 2022-09-22 PROCEDURE — 3075F SYST BP GE 130 - 139MM HG: CPT | Mod: CPTII,,, | Performed by: NURSE PRACTITIONER

## 2022-09-22 PROCEDURE — 3080F DIAST BP >= 90 MM HG: CPT | Mod: CPTII,,, | Performed by: NURSE PRACTITIONER

## 2022-09-22 PROCEDURE — 99213 PR OFFICE/OUTPT VISIT, EST, LEVL III, 20-29 MIN: ICD-10-PCS | Mod: ,,, | Performed by: NURSE PRACTITIONER

## 2022-09-22 PROCEDURE — 99213 OFFICE O/P EST LOW 20 MIN: CPT | Mod: ,,, | Performed by: NURSE PRACTITIONER

## 2022-09-22 PROCEDURE — 4010F PR ACE/ARB THEARPY RXD/TAKEN: ICD-10-PCS | Mod: CPTII,,, | Performed by: NURSE PRACTITIONER

## 2022-09-22 RX ORDER — CYPROHEPTADINE HYDROCHLORIDE 4 MG/1
2 TABLET ORAL 3 TIMES DAILY
Qty: 90 TABLET | Refills: 2 | Status: SHIPPED | OUTPATIENT
Start: 2022-09-22 | End: 2023-05-25 | Stop reason: SDUPTHER

## 2022-09-22 NOTE — PROGRESS NOTES
Pt is a 63 y/o BF here for f/u of reflux. Symptoms all resolved on Protonix. Taking BID.  Weight down from 162 to 147 lb last visit - today 138 lb. States she has no appetite - no N/V, just no hungry. States was going through a lot of personal life stress/deaths over the last few months when she was losing but is feeling better now and GERD is controlled so thinks she can gain back. Requests appetite stimulant.   Colonoscopy last month showed 3 TA polyps.    Past Medical History:   Diagnosis Date    GERD (gastroesophageal reflux disease)     Heart attack 2017    Hypertension      Review of Systems   Constitutional:  Negative for chills and fever.   Respiratory:  Negative for shortness of breath.    Cardiovascular:  Negative for chest pain.   Gastrointestinal:  Negative for abdominal pain, blood in stool, constipation, diarrhea, melena, nausea and vomiting.   Skin:  Negative for rash.   Neurological:  Negative for weakness.     Physical Exam  Vitals reviewed. Exam conducted with a chaperone present.   Constitutional:       General: She is not in acute distress.     Appearance: Normal appearance.   HENT:      Head: Normocephalic.   Eyes:      General: No scleral icterus.     Pupils: Pupils are equal, round, and reactive to light.   Cardiovascular:      Rate and Rhythm: Normal rate.   Pulmonary:      Effort: Pulmonary effort is normal.   Abdominal:      General: There is no distension.      Palpations: Abdomen is soft.      Tenderness: There is no abdominal tenderness. There is no guarding or rebound.   Skin:     General: Skin is warm and dry.   Neurological:      General: No focal deficit present.      Mental Status: She is alert and oriented to person, place, and time. Mental status is at baseline.   Psychiatric:         Mood and Affect: Mood normal.         Behavior: Behavior normal.         Thought Content: Thought content normal.         Judgment: Judgment normal.     Plan  -d/w pt EGD, CT CAP for 20 lb weight  loss; pt declines, states she does not eat much and thinks this is why she lost; agrees to reassess at f/u and proceed with EGD/CTs if still losing  -decrease Protonix from BID to daily as tolerated  -antireflux measures reinforced  -Periactin - start 2 mg TID, increase up to 8 mg QID over 3 weeks  -RTC 3 months, sooner PRN

## 2022-10-24 ENCOUNTER — OFFICE VISIT (OUTPATIENT)
Dept: FAMILY MEDICINE | Facility: CLINIC | Age: 62
End: 2022-10-24
Payer: COMMERCIAL

## 2022-10-24 VITALS
RESPIRATION RATE: 20 BRPM | SYSTOLIC BLOOD PRESSURE: 124 MMHG | DIASTOLIC BLOOD PRESSURE: 82 MMHG | BODY MASS INDEX: 28.59 KG/M2 | OXYGEN SATURATION: 97 % | HEART RATE: 79 BPM | TEMPERATURE: 97 F | HEIGHT: 59 IN | WEIGHT: 141.81 LBS

## 2022-10-24 DIAGNOSIS — K21.9 GASTROESOPHAGEAL REFLUX DISEASE, UNSPECIFIED WHETHER ESOPHAGITIS PRESENT: ICD-10-CM

## 2022-10-24 DIAGNOSIS — I10 PRIMARY HYPERTENSION: Primary | ICD-10-CM

## 2022-10-24 PROCEDURE — 1159F PR MEDICATION LIST DOCUMENTED IN MEDICAL RECORD: ICD-10-PCS | Mod: CPTII,,, | Performed by: INTERNAL MEDICINE

## 2022-10-24 PROCEDURE — 99214 OFFICE O/P EST MOD 30 MIN: CPT | Mod: ,,, | Performed by: INTERNAL MEDICINE

## 2022-10-24 PROCEDURE — 3074F PR MOST RECENT SYSTOLIC BLOOD PRESSURE < 130 MM HG: ICD-10-PCS | Mod: CPTII,,, | Performed by: INTERNAL MEDICINE

## 2022-10-24 PROCEDURE — 3074F SYST BP LT 130 MM HG: CPT | Mod: CPTII,,, | Performed by: INTERNAL MEDICINE

## 2022-10-24 PROCEDURE — 1160F PR REVIEW ALL MEDS BY PRESCRIBER/CLIN PHARMACIST DOCUMENTED: ICD-10-PCS | Mod: CPTII,,, | Performed by: INTERNAL MEDICINE

## 2022-10-24 PROCEDURE — 4010F ACE/ARB THERAPY RXD/TAKEN: CPT | Mod: CPTII,,, | Performed by: INTERNAL MEDICINE

## 2022-10-24 PROCEDURE — 3079F DIAST BP 80-89 MM HG: CPT | Mod: CPTII,,, | Performed by: INTERNAL MEDICINE

## 2022-10-24 PROCEDURE — 4010F PR ACE/ARB THEARPY RXD/TAKEN: ICD-10-PCS | Mod: CPTII,,, | Performed by: INTERNAL MEDICINE

## 2022-10-24 PROCEDURE — 3079F PR MOST RECENT DIASTOLIC BLOOD PRESSURE 80-89 MM HG: ICD-10-PCS | Mod: CPTII,,, | Performed by: INTERNAL MEDICINE

## 2022-10-24 PROCEDURE — 1159F MED LIST DOCD IN RCRD: CPT | Mod: CPTII,,, | Performed by: INTERNAL MEDICINE

## 2022-10-24 PROCEDURE — 99214 PR OFFICE/OUTPT VISIT, EST, LEVL IV, 30-39 MIN: ICD-10-PCS | Mod: ,,, | Performed by: INTERNAL MEDICINE

## 2022-10-24 PROCEDURE — 1160F RVW MEDS BY RX/DR IN RCRD: CPT | Mod: CPTII,,, | Performed by: INTERNAL MEDICINE

## 2022-10-24 NOTE — PROGRESS NOTES
Subjective:       Patient ID: Faviola King is a 62 y.o. female.    Chief Complaint: Nicotine Dependence, Anxiety, Hypertension, and Gastroesophageal Reflux    Patient is here today for a follow up evaluation. Patient blood pressure is stable today on intake, 124/82. Patient has no new complaints. Patient states her acid reflux is stable. Patient states she has decreased her tobacco intake. Will follow with patient in 4 months.     Current Medications:    Current Outpatient Medications:     ALPRAZolam (XANAX) 0.25 MG tablet, Take 1 tablet (0.25 mg total) by mouth nightly as needed for Anxiety., Disp: 30 tablet, Rfl: 1    amLODIPine (NORVASC) 10 MG tablet, Take 1 tablet (10 mg total) by mouth once daily., Disp: 90 tablet, Rfl: 1    carvediloL (COREG) 6.25 MG tablet, Take 1 tablet (6.25 mg total) by mouth 2 (two) times a day., Disp: 180 tablet, Rfl: 1    cyproheptadine (PERIACTIN) 4 mg tablet, Take 0.5 tablets (2 mg total) by mouth 3 (three) times daily., Disp: 90 tablet, Rfl: 2    nicotine (NICODERM CQ) 14 mg/24 hr, Place 1 patch onto the skin once daily., Disp: 30 patch, Rfl: 1    ondansetron (ZOFRAN-ODT) 4 MG TbDL, Take 2 tablets (8 mg total) by mouth 2 (two) times daily., Disp: 20 tablet, Rfl: 0    pantoprazole (PROTONIX) 40 MG tablet, Take 1 tablet (40 mg total) by mouth once daily., Disp: 90 tablet, Rfl: 1    potassium chloride SA (K-DUR,KLOR-CON) 20 MEQ tablet, Take 1 tablet (20 mEq total) by mouth 3 (three) times daily., Disp: 6 tablet, Rfl: 1    Last Labs:     No visits with results within 1 Month(s) from this visit.   Latest known visit with results is:   Lab Visit on 09/07/2022   Component Date Value    Potassium 09/07/2022 4.0        Last Imaging:  Colonoscopy  Narrative: Procedure Date  8/10/22    Impression  Overall   Impression:  3 small colon polyps removed.    Recommendation  Await pathology results  Repeat colonoscopy in 3 years    Indication  Encounter for screening colonoscopy    Providers  Attending:  Antoine Vargas MD  Fellow:    Other Staff: Trav Culp RN, Francis Morris CRNA    Medications  Moderate sedation administered by anesthesia staff - See anesthesia   record.    Preprocedure  A history and physical has been performed, and patient medication   allergies have been reviewed. The patient's tolerance of previous   anesthesia has been reviewed. The risks and benefits of the procedure and   the sedation options and risks were discussed with the patient. All   questions were answered and informed consent obtained.    ASA Score: ASA 2 - Patient with mild systemic disease with no functional   limitations  Mallampati Airway Score: III (soft and hard palate and base of uvula   visible)    Details of the Procedure  The patient underwent monitored anesthesia care, which was administered by   an anesthesia professional. The patient's heart rate, blood pressure,   level of consciousness, respirations, oxygen, ECG and ETCO2 were monitored   throughout the procedure. A digital rectal exam was performed. The scope   was introduced through the anus and advanced to the terminal ileum.   Retroflexion was not performed due to narrow vault. The quality of bowel   preparation was evaluated using the [place] Bowel Preparation Scale with   scores of: right colon = 3, transverse colon = 3, left colon = 3. The   total BBPS score was 9. Bowel prep was adequate. The patient's estimated   blood loss was minimal (<5 mL). The procedure was not difficult. The   patient tolerated the procedure well. There were no apparent   complications.     Scope: Colonoscope  Scope Serial: [id number]    Events  Procedure Events   Event Event Time     Procedure Events   Event Event Time     CECAL WITHDRAWAL TIME:     Findings  Three sessile polyps measuring 5-9 mm in the sigmoid colon; completely   removed en bloc by cold snare and retrieved specimen         Review of Systems   All other systems reviewed and are negative.      Objective:       Physical Exam  Vitals reviewed.   Constitutional:       Appearance: Normal appearance. She is normal weight.   Cardiovascular:      Rate and Rhythm: Normal rate and regular rhythm.      Pulses: Normal pulses.      Heart sounds: Normal heart sounds.   Pulmonary:      Effort: Pulmonary effort is normal.      Breath sounds: Normal breath sounds.   Abdominal:      General: Abdomen is flat. Bowel sounds are normal.      Palpations: Abdomen is soft.   Musculoskeletal:         General: Normal range of motion.      Cervical back: Normal range of motion and neck supple.   Skin:     General: Skin is warm and dry.   Neurological:      General: No focal deficit present.      Mental Status: She is alert and oriented to person, place, and time. Mental status is at baseline.       Assessment:       1. Primary hypertension        2. Gastroesophageal reflux disease, unspecified whether esophagitis present             Plan:         Faviola was seen today for nicotine dependence, anxiety, hypertension and gastroesophageal reflux.    Diagnoses and all orders for this visit:    Primary hypertension    Gastroesophageal reflux disease, unspecified whether esophagitis present

## 2022-10-24 NOTE — PATIENT INSTRUCTIONS
Faviola was seen today for nicotine dependence, anxiety, hypertension and gastroesophageal reflux.    Diagnoses and all orders for this visit:    Primary hypertension    Gastroesophageal reflux disease, unspecified whether esophagitis present

## 2022-12-22 ENCOUNTER — OFFICE VISIT (OUTPATIENT)
Dept: GASTROENTEROLOGY | Facility: CLINIC | Age: 62
End: 2022-12-22
Payer: COMMERCIAL

## 2022-12-22 VITALS
BODY MASS INDEX: 28.83 KG/M2 | SYSTOLIC BLOOD PRESSURE: 160 MMHG | OXYGEN SATURATION: 96 % | WEIGHT: 143 LBS | HEIGHT: 59 IN | DIASTOLIC BLOOD PRESSURE: 97 MMHG | HEART RATE: 91 BPM

## 2022-12-22 DIAGNOSIS — R63.4 WEIGHT LOSS: Primary | ICD-10-CM

## 2022-12-22 DIAGNOSIS — Z72.0 TOBACCO ABUSE: ICD-10-CM

## 2022-12-22 DIAGNOSIS — K21.9 GASTROESOPHAGEAL REFLUX DISEASE, UNSPECIFIED WHETHER ESOPHAGITIS PRESENT: ICD-10-CM

## 2022-12-22 PROCEDURE — 3077F SYST BP >= 140 MM HG: CPT | Mod: CPTII,,, | Performed by: NURSE PRACTITIONER

## 2022-12-22 PROCEDURE — 4010F PR ACE/ARB THEARPY RXD/TAKEN: ICD-10-PCS | Mod: CPTII,,, | Performed by: NURSE PRACTITIONER

## 2022-12-22 PROCEDURE — 1160F PR REVIEW ALL MEDS BY PRESCRIBER/CLIN PHARMACIST DOCUMENTED: ICD-10-PCS | Mod: CPTII,,, | Performed by: NURSE PRACTITIONER

## 2022-12-22 PROCEDURE — 3077F PR MOST RECENT SYSTOLIC BLOOD PRESSURE >= 140 MM HG: ICD-10-PCS | Mod: CPTII,,, | Performed by: NURSE PRACTITIONER

## 2022-12-22 PROCEDURE — 3080F DIAST BP >= 90 MM HG: CPT | Mod: CPTII,,, | Performed by: NURSE PRACTITIONER

## 2022-12-22 PROCEDURE — 99214 PR OFFICE/OUTPT VISIT, EST, LEVL IV, 30-39 MIN: ICD-10-PCS | Mod: ,,, | Performed by: NURSE PRACTITIONER

## 2022-12-22 PROCEDURE — 99214 OFFICE O/P EST MOD 30 MIN: CPT | Mod: ,,, | Performed by: NURSE PRACTITIONER

## 2022-12-22 PROCEDURE — 1159F PR MEDICATION LIST DOCUMENTED IN MEDICAL RECORD: ICD-10-PCS | Mod: CPTII,,, | Performed by: NURSE PRACTITIONER

## 2022-12-22 PROCEDURE — 1159F MED LIST DOCD IN RCRD: CPT | Mod: CPTII,,, | Performed by: NURSE PRACTITIONER

## 2022-12-22 PROCEDURE — 1160F RVW MEDS BY RX/DR IN RCRD: CPT | Mod: CPTII,,, | Performed by: NURSE PRACTITIONER

## 2022-12-22 PROCEDURE — 4010F ACE/ARB THERAPY RXD/TAKEN: CPT | Mod: CPTII,,, | Performed by: NURSE PRACTITIONER

## 2022-12-22 PROCEDURE — 3008F BODY MASS INDEX DOCD: CPT | Mod: CPTII,,, | Performed by: NURSE PRACTITIONER

## 2022-12-22 PROCEDURE — 3080F PR MOST RECENT DIASTOLIC BLOOD PRESSURE >= 90 MM HG: ICD-10-PCS | Mod: CPTII,,, | Performed by: NURSE PRACTITIONER

## 2022-12-22 PROCEDURE — 3008F PR BODY MASS INDEX (BMI) DOCUMENTED: ICD-10-PCS | Mod: CPTII,,, | Performed by: NURSE PRACTITIONER

## 2022-12-22 NOTE — PROGRESS NOTES
Faviola King is a 62 y.o. female here for Follow-up        PCP: Shantanu Larson  Referring Provider: No referring provider defined for this encounter.     HPI:  Presents for follow up weight loss. Weight is up 2 lbs. She is taking Periactin. Appetite is increased. Does have some drowsiness associated with taking the periactin. Cautioned patient on operating machinery when taking the medication. States that she feels like her weight loss was due to depression and stress. Denies all GI complaints. She is a smoker. Last chest x ray over a year ago.     Follow-up  Pertinent negatives include no abdominal pain, change in bowel habit, chest pain, fatigue, fever, nausea or vomiting.       ROS:  Review of Systems   Constitutional:  Positive for appetite change (improved) and unexpected weight change (increased 2 lbs). Negative for fatigue and fever.   HENT:  Negative for trouble swallowing.    Respiratory:  Negative for shortness of breath.    Cardiovascular:  Negative for chest pain.   Gastrointestinal:  Negative for abdominal pain, blood in stool, change in bowel habit, constipation, diarrhea, nausea, vomiting, reflux and change in bowel habit.   Musculoskeletal:  Negative for gait problem.   Integumentary:  Negative for pallor.   Neurological:  Negative for dizziness.   Hematological:  Does not bruise/bleed easily.   Psychiatric/Behavioral:  The patient is not nervous/anxious.         PMHX:  has a past medical history of GERD (gastroesophageal reflux disease), Heart attack (2017), and Hypertension.    PSHX:  has a past surgical history that includes Hysterectomy and  section.    PFHX: family history is not on file.    PSlHX:  reports that she has been smoking. She has never used smokeless tobacco. She reports current drug use. Drug: Marijuana. She reports that she does not drink alcohol.        Review of patient's allergies indicates:  No Known Allergies    Medication List with Changes/Refills   Current  "Medications    ALPRAZOLAM (XANAX) 0.25 MG TABLET    Take 1 tablet (0.25 mg total) by mouth nightly as needed for Anxiety.    AMLODIPINE (NORVASC) 10 MG TABLET    Take 1 tablet (10 mg total) by mouth once daily.    CARVEDILOL (COREG) 6.25 MG TABLET    Take 1 tablet (6.25 mg total) by mouth 2 (two) times a day.    CYPROHEPTADINE (PERIACTIN) 4 MG TABLET    Take 0.5 tablets (2 mg total) by mouth 3 (three) times daily.    NICOTINE (NICODERM CQ) 14 MG/24 HR    Place 1 patch onto the skin once daily.    ONDANSETRON (ZOFRAN-ODT) 4 MG TBDL    Take 2 tablets (8 mg total) by mouth 2 (two) times daily.    PANTOPRAZOLE (PROTONIX) 40 MG TABLET    Take 1 tablet (40 mg total) by mouth once daily.    POTASSIUM CHLORIDE SA (K-DUR,KLOR-CON) 20 MEQ TABLET    Take 1 tablet (20 mEq total) by mouth 3 (three) times daily.        Objective Findings:  Vital Signs:  BP (!) 160/97   Pulse 91   Ht 4' 11" (1.499 m)   Wt 64.9 kg (143 lb)   SpO2 96%   BMI 28.88 kg/m²  Body mass index is 28.88 kg/m².    Physical Exam:  Physical Exam  Vitals and nursing note reviewed.   Constitutional:       General: She is not in acute distress.     Appearance: Normal appearance.   HENT:      Mouth/Throat:      Mouth: Mucous membranes are moist.   Cardiovascular:      Rate and Rhythm: Normal rate.   Pulmonary:      Effort: Pulmonary effort is normal.   Abdominal:      General: Bowel sounds are normal. There is no distension.      Palpations: Abdomen is soft. There is no mass.      Tenderness: There is no abdominal tenderness. There is no guarding or rebound.      Hernia: No hernia is present.   Skin:     General: Skin is warm and dry.      Coloration: Skin is not jaundiced or pale.   Neurological:      Mental Status: She is alert and oriented to person, place, and time.   Psychiatric:         Mood and Affect: Mood normal.        Labs:  Lab Results   Component Value Date    WBC 9.21 07/03/2022    HGB 14.9 07/03/2022    HCT 43.4 07/03/2022    MCV 86.8 07/03/2022 "    RDW 14.8 (H) 07/03/2022     07/03/2022    LYMPH 28.6 07/03/2022    LYMPH 2.63 07/03/2022    MONO 8.4 (H) 07/03/2022    EOS 0.13 07/03/2022    BASO 0.04 07/03/2022     Lab Results   Component Value Date     08/25/2022    K 4.0 09/07/2022     08/25/2022    CO2 28 08/25/2022    GLU 90 08/25/2022    BUN 13 08/25/2022    CREATININE 0.82 08/25/2022    CALCIUM 9.7 08/25/2022    PROT 8.4 (H) 07/03/2022    ALBUMIN 3.8 07/03/2022    BILITOT 0.3 07/03/2022    ALKPHOS 71 07/03/2022    AST 23 07/03/2022    ALT 14 07/03/2022         Imaging: No results found.      Assessment:  Faviola King is a 62 y.o. female here with:  1. Weight loss    2. Gastroesophageal reflux disease, unspecified whether esophagitis present    3. Tobacco abuse          Recommendations:  1. Chest x ray, and labs below  2. Use caution when operating machinery and taking Periactin  3. Declines EGD    Follow up in about 3 months (around 3/22/2023).      Order summary:  Orders Placed This Encounter    X-Ray Chest PA And Lateral    CBC Auto Differential    Comprehensive Metabolic Panel    TSH    T4, Free       Thank you for allowing me to participate in the care of Faviola King.      HERMELINDO Francisco

## 2022-12-28 ENCOUNTER — HOSPITAL ENCOUNTER (OUTPATIENT)
Dept: RADIOLOGY | Facility: HOSPITAL | Age: 62
Discharge: HOME | End: 2022-12-28
Attending: NURSE PRACTITIONER
Payer: COMMERCIAL

## 2022-12-28 DIAGNOSIS — R63.4 WEIGHT LOSS: ICD-10-CM

## 2022-12-28 DIAGNOSIS — R93.89 ABNORMAL ULTRASOUND: Primary | ICD-10-CM

## 2022-12-28 DIAGNOSIS — Z72.0 TOBACCO ABUSE: ICD-10-CM

## 2022-12-28 PROCEDURE — 71046 XR CHEST PA AND LATERAL: ICD-10-PCS | Mod: 26,,, | Performed by: STUDENT IN AN ORGANIZED HEALTH CARE EDUCATION/TRAINING PROGRAM

## 2022-12-28 PROCEDURE — 71046 X-RAY EXAM CHEST 2 VIEWS: CPT | Mod: TC

## 2022-12-28 PROCEDURE — 71046 X-RAY EXAM CHEST 2 VIEWS: CPT | Mod: 26,,, | Performed by: STUDENT IN AN ORGANIZED HEALTH CARE EDUCATION/TRAINING PROGRAM

## 2022-12-29 ENCOUNTER — TELEPHONE (OUTPATIENT)
Dept: GASTROENTEROLOGY | Facility: CLINIC | Age: 62
End: 2022-12-29
Payer: COMMERCIAL

## 2023-01-12 ENCOUNTER — HOSPITAL ENCOUNTER (OUTPATIENT)
Dept: RADIOLOGY | Facility: HOSPITAL | Age: 63
Discharge: HOME | End: 2023-01-12
Attending: NURSE PRACTITIONER
Payer: COMMERCIAL

## 2023-01-12 DIAGNOSIS — R93.89 ABNORMAL ULTRASOUND: ICD-10-CM

## 2023-01-12 LAB — CREAT SERPL-MCNC: 0.7 MG/DL (ref 0.6–1.3)

## 2023-01-12 PROCEDURE — 25500020 PHARM REV CODE 255: Performed by: NURSE PRACTITIONER

## 2023-01-12 PROCEDURE — 71260 CT THORAX DX C+: CPT | Mod: 26,,, | Performed by: RADIOLOGY

## 2023-01-12 PROCEDURE — 71260 CT THORAX DX C+: CPT | Mod: TC

## 2023-01-12 PROCEDURE — 71260 CT CHEST WITH CONTRAST: ICD-10-PCS | Mod: 26,,, | Performed by: RADIOLOGY

## 2023-01-12 PROCEDURE — 82565 ASSAY OF CREATININE: CPT

## 2023-01-12 RX ADMIN — IOPAMIDOL 100 ML: 755 INJECTION, SOLUTION INTRAVENOUS at 11:01

## 2023-01-13 ENCOUNTER — TELEPHONE (OUTPATIENT)
Dept: PULMONOLOGY | Facility: CLINIC | Age: 63
End: 2023-01-13
Payer: COMMERCIAL

## 2023-01-13 DIAGNOSIS — R63.4 WEIGHT LOSS: Primary | ICD-10-CM

## 2023-01-13 DIAGNOSIS — R91.1 PULMONARY NODULE 1 CM OR GREATER IN DIAMETER: ICD-10-CM

## 2023-01-13 NOTE — PROGRESS NOTES
Call to patient with results of CT chest that does show a lung nodule in the RUL that has increased in size. Radiology recommends further evaluation with possible PET-CT. We will refer to pulmonary, Dr. Muniz. She agrees to referral and verbalized understanding of results.

## 2023-01-13 NOTE — TELEPHONE ENCOUNTER
Consult from MIKE Rutledge.     has seen  for weight loss:   recent CT Chest revealed lung nodule that has enlarged from 2020 CT.    Per Ms.Andrea's  request  was phoned with consult appointment with  scheduled for 1/2623  at 1:30.     confirmed that date/time is convenient for her.  She voiced understanding that CT showed lung nodule.  She was given clinic phone number to call should she have questions or issue with appointment.

## 2023-01-26 ENCOUNTER — OFFICE VISIT (OUTPATIENT)
Dept: PULMONOLOGY | Facility: CLINIC | Age: 63
End: 2023-01-26
Payer: COMMERCIAL

## 2023-01-26 VITALS
OXYGEN SATURATION: 96 % | WEIGHT: 144 LBS | HEIGHT: 59 IN | RESPIRATION RATE: 20 BRPM | BODY MASS INDEX: 29.03 KG/M2 | DIASTOLIC BLOOD PRESSURE: 100 MMHG | HEART RATE: 84 BPM | TEMPERATURE: 99 F | SYSTOLIC BLOOD PRESSURE: 154 MMHG

## 2023-01-26 DIAGNOSIS — R91.1 PULMONARY NODULE 1 CM OR GREATER IN DIAMETER: ICD-10-CM

## 2023-01-26 DIAGNOSIS — R63.4 WEIGHT LOSS: ICD-10-CM

## 2023-01-26 DIAGNOSIS — R91.8 LUNG MASS: ICD-10-CM

## 2023-01-26 PROCEDURE — 3008F BODY MASS INDEX DOCD: CPT | Mod: CPTII,,, | Performed by: INTERNAL MEDICINE

## 2023-01-26 PROCEDURE — 3080F DIAST BP >= 90 MM HG: CPT | Mod: CPTII,,, | Performed by: INTERNAL MEDICINE

## 2023-01-26 PROCEDURE — 99203 OFFICE O/P NEW LOW 30 MIN: CPT | Mod: S$PBB,,, | Performed by: INTERNAL MEDICINE

## 2023-01-26 PROCEDURE — 1159F PR MEDICATION LIST DOCUMENTED IN MEDICAL RECORD: ICD-10-PCS | Mod: CPTII,,, | Performed by: INTERNAL MEDICINE

## 2023-01-26 PROCEDURE — 3077F SYST BP >= 140 MM HG: CPT | Mod: CPTII,,, | Performed by: INTERNAL MEDICINE

## 2023-01-26 PROCEDURE — 3008F PR BODY MASS INDEX (BMI) DOCUMENTED: ICD-10-PCS | Mod: CPTII,,, | Performed by: INTERNAL MEDICINE

## 2023-01-26 PROCEDURE — 1159F MED LIST DOCD IN RCRD: CPT | Mod: CPTII,,, | Performed by: INTERNAL MEDICINE

## 2023-01-26 PROCEDURE — 99215 OFFICE O/P EST HI 40 MIN: CPT | Mod: PBBFAC | Performed by: INTERNAL MEDICINE

## 2023-01-26 PROCEDURE — 99203 PR OFFICE/OUTPT VISIT, NEW, LEVL III, 30-44 MIN: ICD-10-PCS | Mod: S$PBB,,, | Performed by: INTERNAL MEDICINE

## 2023-01-26 PROCEDURE — 3077F PR MOST RECENT SYSTOLIC BLOOD PRESSURE >= 140 MM HG: ICD-10-PCS | Mod: CPTII,,, | Performed by: INTERNAL MEDICINE

## 2023-01-26 PROCEDURE — 3080F PR MOST RECENT DIASTOLIC BLOOD PRESSURE >= 90 MM HG: ICD-10-PCS | Mod: CPTII,,, | Performed by: INTERNAL MEDICINE

## 2023-01-26 NOTE — PROGRESS NOTES
Subjective:       Patient ID: Faviola King is a 62 y.o. female.    Chief Complaint: Abnormal Ct Scan, Pulmonary Nodules (1cm ), and Weight Loss    Abnormal Ct Scan  This is a chronic problem. The current episode started more than 1 month ago. The problem occurs daily. The problem has been unchanged. Pertinent negatives include no abdominal pain, arthralgias, chest pain, chills, congestion, headaches or rash.   Pulmonary Nodules  Pertinent negatives include no abdominal pain, arthralgias, chest pain, chills, congestion, headaches or rash.   Past Medical History:   Diagnosis Date    GERD (gastroesophageal reflux disease)     Heart attack 2017    Hypertension     Nodule of upper lobe of right lung     1.5 x 1.2 cm nodule RUL  23     Past Surgical History:   Procedure Laterality Date     SECTION      HYSTERECTOMY       Family History   Problem Relation Age of Onset    Hypertension Neg Hx     Diabetes Neg Hx     Cancer Neg Hx      Review of patient's allergies indicates:  Not on File   Social History     Tobacco Use    Smoking status: Some Days     Packs/day: 1.00     Years: 40.00     Pack years: 40.00     Types: Cigarettes    Smokeless tobacco: Never    Tobacco comments:     1 PPD smoking history  for ~ 40 years: over the last year cut back to ~ 1/2 PPD: recently to 1 or 2 cigarettes/day   Substance Use Topics    Alcohol use: Never    Drug use: Yes     Types: Marijuana      Review of Systems   Constitutional:  Negative for chills, activity change and night sweats.   HENT:  Negative for congestion and ear pain.    Eyes:  Negative for redness and itching.   Cardiovascular:  Negative for chest pain and palpitations.   Musculoskeletal:  Negative for arthralgias and back pain.   Skin:  Negative for rash.   Gastrointestinal:  Negative for abdominal pain and abdominal distention.   Neurological:  Negative for dizziness and headaches.   Hematological:  Negative for adenopathy. Does not bruise/bleed easily.    Psychiatric/Behavioral:  Negative for confusion. The patient is not nervous/anxious.      Objective:      Physical Exam   Constitutional: She is oriented to person, place, and time. She appears well-developed and well-nourished.   HENT:   Head: Normocephalic.   Nose: Nose normal.   Mouth/Throat: Oropharynx is clear and moist.   Neck: No JVD present. No thyromegaly present.   Cardiovascular: Normal rate, regular rhythm, normal heart sounds and intact distal pulses.   Pulmonary/Chest: Normal expansion, hyperinflation, symmetric chest wall expansion, effort normal and breath sounds normal.   Abdominal: Soft. Bowel sounds are normal.   Musculoskeletal:         General: Normal range of motion.      Cervical back: Normal range of motion and neck supple.   Lymphadenopathy: No supraclavicular adenopathy is present.     She has no cervical adenopathy.   Neurological: She is alert and oriented to person, place, and time. She has normal reflexes.   Skin: Skin is warm and dry.   Psychiatric: She has a normal mood and affect. Her behavior is normal.   Personal Diagnostic Review  none pertinent    No flowsheet data found.      Assessment:       1. Weight loss    2. Pulmonary nodule 1 cm or greater in diameter    3. Lung mass          Outpatient Encounter Medications as of 1/26/2023   Medication Sig Dispense Refill    amLODIPine (NORVASC) 10 MG tablet Take 1 tablet (10 mg total) by mouth once daily. 90 tablet 1    carvediloL (COREG) 6.25 MG tablet Take 1 tablet (6.25 mg total) by mouth 2 (two) times a day. 180 tablet 1    cyproheptadine (PERIACTIN) 4 mg tablet Take 0.5 tablets (2 mg total) by mouth 3 (three) times daily. 90 tablet 2    nicotine (NICODERM CQ) 14 mg/24 hr Place 1 patch onto the skin once daily. 30 patch 1    pantoprazole (PROTONIX) 40 MG tablet Take 1 tablet (40 mg total) by mouth once daily. 90 tablet 1    ALPRAZolam (XANAX) 0.25 MG tablet Take 1 tablet (0.25 mg total) by mouth nightly as needed for Anxiety.  (Patient not taking: Reported on 1/26/2023) 30 tablet 1    ondansetron (ZOFRAN-ODT) 4 MG TbDL Take 2 tablets (8 mg total) by mouth 2 (two) times daily. (Patient not taking: Reported on 1/26/2023) 20 tablet 0    potassium chloride SA (K-DUR,KLOR-CON) 20 MEQ tablet Take 1 tablet (20 mEq total) by mouth 3 (three) times daily. (Patient not taking: Reported on 1/26/2023) 6 tablet 1     No facility-administered encounter medications on file as of 1/26/2023.     Orders Placed This Encounter   Procedures    CT Biopsy Lung w/ guidance     Standing Status:   Future     Standing Expiration Date:   1/26/2024     Order Specific Question:   Reason for Exam:     Answer:   lung mass     Order Specific Question:   May the Radiologist modify the order per protocol to meet the clinical needs of the patient?     Answer:   Yes       Plan:       Problem List Items Addressed This Visit          Pulmonary    Lung mass     Patient has a 1.2 x 1.5 cm posterior peripheral right upper lung mass.  The most efficient diagnostic method in my my would be a needle biopsy.  Informed consent obtained differential diagnosis discussed            Endocrine    Weight loss     Other Visit Diagnoses       Pulmonary nodule 1 cm or greater in diameter        Relevant Orders    CT Biopsy Lung w/ guidance

## 2023-01-26 NOTE — ASSESSMENT & PLAN NOTE
Patient has a 1.2 x 1.5 cm posterior peripheral right upper lung mass.  The most efficient diagnostic method in my my would be a needle biopsy.  Informed consent obtained differential diagnosis discussed

## 2023-02-02 ENCOUNTER — HOSPITAL ENCOUNTER (OUTPATIENT)
Dept: RADIOLOGY | Facility: HOSPITAL | Age: 63
Discharge: HOME | End: 2023-02-02
Attending: INTERNAL MEDICINE
Payer: COMMERCIAL

## 2023-02-02 VITALS
HEART RATE: 70 BPM | DIASTOLIC BLOOD PRESSURE: 91 MMHG | HEIGHT: 59 IN | TEMPERATURE: 99 F | RESPIRATION RATE: 18 BRPM | BODY MASS INDEX: 29.08 KG/M2 | OXYGEN SATURATION: 100 % | SYSTOLIC BLOOD PRESSURE: 147 MMHG

## 2023-02-02 DIAGNOSIS — R91.1 PULMONARY NODULE 1 CM OR GREATER IN DIAMETER: ICD-10-CM

## 2023-02-02 LAB
APTT PPP: 30 SECONDS (ref 25.2–37.3)
BASOPHILS # BLD AUTO: 0.03 K/UL (ref 0–0.2)
BASOPHILS NFR BLD AUTO: 0.4 % (ref 0–1)
DIFFERENTIAL METHOD BLD: ABNORMAL
EOSINOPHIL # BLD AUTO: 0.11 K/UL (ref 0–0.5)
EOSINOPHIL NFR BLD AUTO: 1.5 % (ref 1–4)
ERYTHROCYTE [DISTWIDTH] IN BLOOD BY AUTOMATED COUNT: 15.6 % (ref 11.5–14.5)
HCT VFR BLD AUTO: 43 % (ref 38–47)
HGB BLD-MCNC: 14.1 G/DL (ref 12–16)
IMM GRANULOCYTES # BLD AUTO: 0.02 K/UL (ref 0–0.04)
IMM GRANULOCYTES NFR BLD: 0.3 % (ref 0–0.4)
INR BLD: 0.91
LYMPHOCYTES # BLD AUTO: 2.35 K/UL (ref 1–4.8)
LYMPHOCYTES NFR BLD AUTO: 32.5 % (ref 27–41)
MCH RBC QN AUTO: 29 PG (ref 27–31)
MCHC RBC AUTO-ENTMCNC: 32.8 G/DL (ref 32–36)
MCV RBC AUTO: 88.5 FL (ref 80–96)
MONOCYTES # BLD AUTO: 0.73 K/UL (ref 0–0.8)
MONOCYTES NFR BLD AUTO: 10.1 % (ref 2–6)
MPC BLD CALC-MCNC: 9.3 FL (ref 9.4–12.4)
NEUTROPHILS # BLD AUTO: 3.98 K/UL (ref 1.8–7.7)
NEUTROPHILS NFR BLD AUTO: 55.2 % (ref 53–65)
NRBC # BLD AUTO: 0 X10E3/UL
NRBC, AUTO (.00): 0 %
PLATELET # BLD AUTO: 287 K/UL (ref 150–400)
PROTHROMBIN TIME: 11.9 SECONDS (ref 11.7–14.7)
RBC # BLD AUTO: 4.86 M/UL (ref 4.2–5.4)
WBC # BLD AUTO: 7.22 K/UL (ref 4.5–11)

## 2023-02-02 PROCEDURE — 99152 MOD SED SAME PHYS/QHP 5/>YRS: CPT

## 2023-02-02 PROCEDURE — 88342 SURGICAL PATHOLOGY: ICD-10-PCS | Mod: 26,,, | Performed by: PATHOLOGY

## 2023-02-02 PROCEDURE — 71046 X-RAY EXAM CHEST 2 VIEWS: CPT | Mod: 26,76,, | Performed by: RADIOLOGY

## 2023-02-02 PROCEDURE — 32408 CORE NDL BX LNG/MED PERQ: CPT

## 2023-02-02 PROCEDURE — 88341 SURGICAL PATHOLOGY: ICD-10-PCS | Mod: 26,,, | Performed by: PATHOLOGY

## 2023-02-02 PROCEDURE — 88305 TISSUE EXAM BY PATHOLOGIST: CPT | Mod: TC,SUR | Performed by: INTERNAL MEDICINE

## 2023-02-02 PROCEDURE — 88341 IMHCHEM/IMCYTCHM EA ADD ANTB: CPT | Mod: 26,,, | Performed by: PATHOLOGY

## 2023-02-02 PROCEDURE — 71046 X-RAY EXAM CHEST 2 VIEWS: CPT | Mod: TC

## 2023-02-02 PROCEDURE — 99153 MOD SED SAME PHYS/QHP EA: CPT

## 2023-02-02 PROCEDURE — 63600175 PHARM REV CODE 636 W HCPCS: Performed by: RADIOLOGY

## 2023-02-02 PROCEDURE — 88305 TISSUE EXAM BY PATHOLOGIST: CPT | Mod: 26,,, | Performed by: PATHOLOGY

## 2023-02-02 PROCEDURE — 32408 CT BIOPSY LUNG W/ GUIDANCE: ICD-10-PCS | Mod: ,,, | Performed by: RADIOLOGY

## 2023-02-02 PROCEDURE — 71046 X-RAY EXAM CHEST 2 VIEWS: CPT | Mod: 26,,, | Performed by: RADIOLOGY

## 2023-02-02 PROCEDURE — 71046 XR CHEST 2 VIEW INSPIRATION EXPIRATION: ICD-10-PCS | Mod: 26,,, | Performed by: RADIOLOGY

## 2023-02-02 PROCEDURE — 85730 THROMBOPLASTIN TIME PARTIAL: CPT | Performed by: RADIOLOGY

## 2023-02-02 PROCEDURE — 88305 SURGICAL PATHOLOGY: ICD-10-PCS | Mod: 26,,, | Performed by: PATHOLOGY

## 2023-02-02 PROCEDURE — 32408 CORE NDL BX LNG/MED PERQ: CPT | Mod: ,,, | Performed by: RADIOLOGY

## 2023-02-02 PROCEDURE — 85025 COMPLETE CBC W/AUTO DIFF WBC: CPT | Performed by: RADIOLOGY

## 2023-02-02 PROCEDURE — 88342 IMHCHEM/IMCYTCHM 1ST ANTB: CPT | Mod: 26,,, | Performed by: PATHOLOGY

## 2023-02-02 PROCEDURE — 71046 XR CHEST 2 VIEW INSPIRATION EXPIRATION: ICD-10-PCS | Mod: 26,76,, | Performed by: RADIOLOGY

## 2023-02-02 RX ORDER — MIDAZOLAM HYDROCHLORIDE 1 MG/ML
INJECTION INTRAMUSCULAR; INTRAVENOUS
Status: COMPLETED | OUTPATIENT
Start: 2023-02-02 | End: 2023-02-02

## 2023-02-02 RX ORDER — FENTANYL CITRATE 50 UG/ML
INJECTION, SOLUTION INTRAMUSCULAR; INTRAVENOUS
Status: COMPLETED | OUTPATIENT
Start: 2023-02-02 | End: 2023-02-02

## 2023-02-02 RX ADMIN — FENTANYL CITRATE 50 MCG: 50 INJECTION INTRAMUSCULAR; INTRAVENOUS at 11:02

## 2023-02-02 RX ADMIN — MIDAZOLAM HYDROCHLORIDE 1 MG: 1 INJECTION, SOLUTION INTRAMUSCULAR; INTRAVENOUS at 11:02

## 2023-02-02 NOTE — PROGRESS NOTES
Lung biopsy nodule right upper lobe  Performed by Dr. Uday CUMMINGS&SANDRA has been reviewed.  Previous images have been reviewed.  Labs are within normal limits for procedure.  Procedure was explained to the patient including risks and possible complications (bleeding, infection, pneumothorax).  Patient agreed to procedure consent is signed.  A formal timeout was called all staff present agree patient procedure.  The upper right posterior thorax was prepped with ChloraPrep and sterile field was established.  1% lidocaine was used as local anesthetic.  Conscious sedation was also utilized please see nurse's note.  Conscious sedation start time was 11:00 a.m..  Under CT guidance a 20 gauge Temno biopsy needle was placed into the nodule in the right upper lobe from a posterior approach.  Three core biopsy samples were taken and placed into formalin.  The needle was removed and the puncture site was cleaned and bandaged.  The patient tolerated the procedure well there were no immediate postprocedure complications.  The patient will be transferred to Diagnostic Radiology for a post biopsy chest x-ray.

## 2023-02-06 LAB
DHEA SERPL-MCNC: NORMAL
ESTROGEN SERPL-MCNC: NORMAL PG/ML
INSULIN SERPL-ACNC: NORMAL U[IU]/ML
LAB AP CLINICAL INFORMATION: NORMAL
LAB AP GROSS DESCRIPTION: NORMAL
LAB AP LABORATORY NOTES: NORMAL
T3RU NFR SERPL: NORMAL %

## 2023-02-09 ENCOUNTER — OFFICE VISIT (OUTPATIENT)
Dept: PULMONOLOGY | Facility: CLINIC | Age: 63
End: 2023-02-09
Payer: COMMERCIAL

## 2023-02-09 VITALS
HEIGHT: 59 IN | BODY MASS INDEX: 29.03 KG/M2 | TEMPERATURE: 98 F | HEART RATE: 88 BPM | RESPIRATION RATE: 16 BRPM | SYSTOLIC BLOOD PRESSURE: 138 MMHG | OXYGEN SATURATION: 100 % | DIASTOLIC BLOOD PRESSURE: 85 MMHG | WEIGHT: 144 LBS

## 2023-02-09 DIAGNOSIS — R91.8 LUNG MASS: Primary | ICD-10-CM

## 2023-02-09 PROCEDURE — 99214 OFFICE O/P EST MOD 30 MIN: CPT | Mod: S$PBB,,, | Performed by: INTERNAL MEDICINE

## 2023-02-09 PROCEDURE — 3075F PR MOST RECENT SYSTOLIC BLOOD PRESS GE 130-139MM HG: ICD-10-PCS | Mod: CPTII,,, | Performed by: INTERNAL MEDICINE

## 2023-02-09 PROCEDURE — 3008F PR BODY MASS INDEX (BMI) DOCUMENTED: ICD-10-PCS | Mod: CPTII,,, | Performed by: INTERNAL MEDICINE

## 2023-02-09 PROCEDURE — 1159F MED LIST DOCD IN RCRD: CPT | Mod: CPTII,,, | Performed by: INTERNAL MEDICINE

## 2023-02-09 PROCEDURE — 3079F PR MOST RECENT DIASTOLIC BLOOD PRESSURE 80-89 MM HG: ICD-10-PCS | Mod: CPTII,,, | Performed by: INTERNAL MEDICINE

## 2023-02-09 PROCEDURE — 3079F DIAST BP 80-89 MM HG: CPT | Mod: CPTII,,, | Performed by: INTERNAL MEDICINE

## 2023-02-09 PROCEDURE — 3075F SYST BP GE 130 - 139MM HG: CPT | Mod: CPTII,,, | Performed by: INTERNAL MEDICINE

## 2023-02-09 PROCEDURE — 99214 PR OFFICE/OUTPT VISIT, EST, LEVL IV, 30-39 MIN: ICD-10-PCS | Mod: S$PBB,,, | Performed by: INTERNAL MEDICINE

## 2023-02-09 PROCEDURE — 99215 OFFICE O/P EST HI 40 MIN: CPT | Mod: PBBFAC | Performed by: INTERNAL MEDICINE

## 2023-02-09 PROCEDURE — 1159F PR MEDICATION LIST DOCUMENTED IN MEDICAL RECORD: ICD-10-PCS | Mod: CPTII,,, | Performed by: INTERNAL MEDICINE

## 2023-02-09 PROCEDURE — 3008F BODY MASS INDEX DOCD: CPT | Mod: CPTII,,, | Performed by: INTERNAL MEDICINE

## 2023-02-10 NOTE — ASSESSMENT & PLAN NOTE
Biopsy showed nonspecific malignancy not sure what to do about it in my mind probably what we should do as a PET scan and possible surgical removal however they wanted opinion with Oncology I have set her up with

## 2023-02-10 NOTE — PROGRESS NOTES
Subjective:       Patient ID: Faviola King is a 62 y.o. female.    Chief Complaint: Abnormal Ct Scan (2 weeks with CT Biopsy )    Abnormal Ct Scan  This is a chronic problem. The current episode started today. The problem has been unchanged. Pertinent negatives include no abdominal pain, arthralgias, chest pain, chills, congestion, headaches or rash.   Past Medical History:   Diagnosis Date    GERD (gastroesophageal reflux disease)     Heart attack 2017    Hypertension     Nodule of upper lobe of right lung     1.5 x 1.2 cm nodule RUL  23     Past Surgical History:   Procedure Laterality Date     SECTION      HYSTERECTOMY       Family History   Problem Relation Age of Onset    Hypertension Neg Hx     Diabetes Neg Hx     Cancer Neg Hx      Review of patient's allergies indicates:  Not on File   Social History     Tobacco Use    Smoking status: Some Days     Packs/day: 1.00     Years: 40.00     Pack years: 40.00     Types: Cigarettes    Smokeless tobacco: Never    Tobacco comments:     1 PPD smoking history  for ~ 40 years: over the last year cut back to ~ 1/2 PPD: recently to 1 or 2 cigarettes/day   Substance Use Topics    Alcohol use: Never    Drug use: Yes     Types: Marijuana      Review of Systems   Constitutional:  Negative for chills, activity change and night sweats.   HENT:  Negative for congestion and ear pain.    Eyes:  Negative for redness and itching.   Cardiovascular:  Negative for chest pain and palpitations.   Musculoskeletal:  Negative for arthralgias and back pain.   Skin:  Negative for rash.   Gastrointestinal:  Negative for abdominal pain and abdominal distention.   Neurological:  Negative for dizziness and headaches.   Hematological:  Negative for adenopathy. Does not bruise/bleed easily.   Psychiatric/Behavioral:  Negative for confusion. The patient is not nervous/anxious.      Objective:      Physical Exam   Constitutional: She is oriented to person, place, and time. She appears  well-developed and well-nourished.   HENT:   Head: Normocephalic.   Nose: Nose normal.   Mouth/Throat: Oropharynx is clear and moist.   Neck: No JVD present. No thyromegaly present.   Cardiovascular: Normal rate, regular rhythm, normal heart sounds and intact distal pulses.   Pulmonary/Chest: Normal expansion, hyperinflation, symmetric chest wall expansion, effort normal and breath sounds normal.   Abdominal: Soft. Bowel sounds are normal.   Musculoskeletal:         General: Normal range of motion.      Cervical back: Normal range of motion and neck supple.   Lymphadenopathy: No supraclavicular adenopathy is present.     She has no cervical adenopathy.   Neurological: She is alert and oriented to person, place, and time. She has normal reflexes.   Skin: Skin is warm and dry.   Psychiatric: She has a normal mood and affect. Her behavior is normal.   Personal Diagnostic Review  none pertinent    No flowsheet data found.      Assessment:       1. Lung mass          Outpatient Encounter Medications as of 2/9/2023   Medication Sig Dispense Refill    amLODIPine (NORVASC) 10 MG tablet Take 1 tablet (10 mg total) by mouth once daily. 90 tablet 1    carvediloL (COREG) 6.25 MG tablet Take 1 tablet (6.25 mg total) by mouth 2 (two) times a day. 180 tablet 1    cyproheptadine (PERIACTIN) 4 mg tablet Take 0.5 tablets (2 mg total) by mouth 3 (three) times daily. 90 tablet 2    nicotine (NICODERM CQ) 14 mg/24 hr Place 1 patch onto the skin once daily. 30 patch 1    ALPRAZolam (XANAX) 0.25 MG tablet Take 1 tablet (0.25 mg total) by mouth nightly as needed for Anxiety. (Patient not taking: Reported on 1/26/2023) 30 tablet 1    ondansetron (ZOFRAN-ODT) 4 MG TbDL Take 2 tablets (8 mg total) by mouth 2 (two) times daily. (Patient not taking: Reported on 1/26/2023) 20 tablet 0    pantoprazole (PROTONIX) 40 MG tablet Take 1 tablet (40 mg total) by mouth once daily. 90 tablet 1    potassium chloride SA (K-DUR,KLOR-CON) 20 MEQ tablet Take 1  tablet (20 mEq total) by mouth 3 (three) times daily. (Patient not taking: Reported on 1/26/2023) 6 tablet 1     No facility-administered encounter medications on file as of 2/9/2023.     Orders Placed This Encounter   Procedures    Ambulatory referral/consult to Hematology / Oncology     Standing Status:   Future     Standing Expiration Date:   3/9/2024     Referral Priority:   Routine     Referral Type:   Consultation     Referral Reason:   Specialty Services Required     Referred to Provider:   Ervin Brock MD     Requested Specialty:   Hematology and Oncology     Number of Visits Requested:   1       Plan:       Problem List Items Addressed This Visit          Pulmonary    Lung mass - Primary     Biopsy showed nonspecific malignancy not sure what to do about it in my mind probably what we should do as a PET scan and possible surgical removal however they wanted opinion with Oncology I have set her up with          Relevant Orders    Ambulatory referral/consult to Hematology / Oncology

## 2023-02-14 ENCOUNTER — TELEPHONE (OUTPATIENT)
Dept: PULMONOLOGY | Facility: CLINIC | Age: 63
End: 2023-02-14
Payer: COMMERCIAL

## 2023-02-14 NOTE — TELEPHONE ENCOUNTER
[phone]  fax    [phone] phone  , [facility]  Emily, New Patient coordinator    Packet fax'd to 's office   and call placed to Emily  With request for New Patient consult for Ms. King.  Notes sent per VO /gp

## 2023-02-24 ENCOUNTER — TELEPHONE (OUTPATIENT)
Dept: PULMONOLOGY | Facility: CLINIC | Age: 63
End: 2023-02-24
Payer: COMMERCIAL

## 2023-02-24 NOTE — TELEPHONE ENCOUNTER
"[facility]  Phone [phone]   fax [phone]    With most recent clinic visit 2/9/23   reviewed abnormal Lung Biopsy results   and discussed follow-up options for .     reported that prior to any additional procedures she would like an Oncology consult.    Consult request was forwarded   to  at [facility].  Call returned from Oncology that they do not accept 's current insurance.    When this information was shared with    She requested consult with Oncology at [facility].    [facility] was phoned: [facility]  took 's information.    Packet with recent work-up notes to [facility] per fax.    Packet included copy of above note, demographics,   "snap shot", Path report from CT guided lung Bx, CT and CXR reports, and 3 most recent clinic notes   (two from , one from CYNTHIA Cortez who consulted .)  Our fax confirmed delivery of notes.     was phoned and told that notes and appointment request has been sent to [facility].  B team said return call with appt date and time could be expected mid week, next week.  She was encouraged to call if not hearing from [facility].  "

## 2023-02-27 ENCOUNTER — OFFICE VISIT (OUTPATIENT)
Dept: FAMILY MEDICINE | Facility: CLINIC | Age: 63
End: 2023-02-27
Payer: COMMERCIAL

## 2023-02-27 VITALS
WEIGHT: 150.81 LBS | BODY MASS INDEX: 30.4 KG/M2 | TEMPERATURE: 97 F | OXYGEN SATURATION: 99 % | SYSTOLIC BLOOD PRESSURE: 138 MMHG | HEART RATE: 83 BPM | DIASTOLIC BLOOD PRESSURE: 88 MMHG | RESPIRATION RATE: 18 BRPM | HEIGHT: 59 IN

## 2023-02-27 DIAGNOSIS — E87.6 HYPOKALEMIA: Primary | ICD-10-CM

## 2023-02-27 DIAGNOSIS — M54.50 ACUTE BILATERAL LOW BACK PAIN WITHOUT SCIATICA: ICD-10-CM

## 2023-02-27 PROCEDURE — 3079F PR MOST RECENT DIASTOLIC BLOOD PRESSURE 80-89 MM HG: ICD-10-PCS | Mod: CPTII,,, | Performed by: INTERNAL MEDICINE

## 2023-02-27 PROCEDURE — 1160F RVW MEDS BY RX/DR IN RCRD: CPT | Mod: CPTII,,, | Performed by: INTERNAL MEDICINE

## 2023-02-27 PROCEDURE — 3075F SYST BP GE 130 - 139MM HG: CPT | Mod: CPTII,,, | Performed by: INTERNAL MEDICINE

## 2023-02-27 PROCEDURE — 99214 OFFICE O/P EST MOD 30 MIN: CPT | Mod: ,,, | Performed by: INTERNAL MEDICINE

## 2023-02-27 PROCEDURE — 3008F BODY MASS INDEX DOCD: CPT | Mod: CPTII,,, | Performed by: INTERNAL MEDICINE

## 2023-02-27 PROCEDURE — 1159F MED LIST DOCD IN RCRD: CPT | Mod: CPTII,,, | Performed by: INTERNAL MEDICINE

## 2023-02-27 PROCEDURE — 1159F PR MEDICATION LIST DOCUMENTED IN MEDICAL RECORD: ICD-10-PCS | Mod: CPTII,,, | Performed by: INTERNAL MEDICINE

## 2023-02-27 PROCEDURE — 99214 PR OFFICE/OUTPT VISIT, EST, LEVL IV, 30-39 MIN: ICD-10-PCS | Mod: ,,, | Performed by: INTERNAL MEDICINE

## 2023-02-27 PROCEDURE — 1160F PR REVIEW ALL MEDS BY PRESCRIBER/CLIN PHARMACIST DOCUMENTED: ICD-10-PCS | Mod: CPTII,,, | Performed by: INTERNAL MEDICINE

## 2023-02-27 PROCEDURE — 3075F PR MOST RECENT SYSTOLIC BLOOD PRESS GE 130-139MM HG: ICD-10-PCS | Mod: CPTII,,, | Performed by: INTERNAL MEDICINE

## 2023-02-27 PROCEDURE — 3079F DIAST BP 80-89 MM HG: CPT | Mod: CPTII,,, | Performed by: INTERNAL MEDICINE

## 2023-02-27 PROCEDURE — 3008F PR BODY MASS INDEX (BMI) DOCUMENTED: ICD-10-PCS | Mod: CPTII,,, | Performed by: INTERNAL MEDICINE

## 2023-02-28 NOTE — PROGRESS NOTES
"Subjective:       Patient ID: Faviola King is a 62 y.o. female.    Chief Complaint: Follow-up (4 month) and Shoulder Pain (Left shoulder/ side)    Patient is here today for a follow up evaluation. Patient blood pressure is stable today on intake, 138/88. Patient has no new complaints. Patient states her lower back pain has improved. Patient is requesting medication refills. Will refill today. Will follow with patient in 2 months.     .    Current Medications:    Current Outpatient Medications:     ALPRAZolam (XANAX) 0.25 MG tablet, Take 1 tablet (0.25 mg total) by mouth nightly as needed for Anxiety., Disp: 30 tablet, Rfl: 1    amLODIPine (NORVASC) 10 MG tablet, Take 1 tablet (10 mg total) by mouth once daily., Disp: 90 tablet, Rfl: 1    carvediloL (COREG) 6.25 MG tablet, Take 1 tablet by mouth twice daily, Disp: 180 tablet, Rfl: 0    cyproheptadine (PERIACTIN) 4 mg tablet, Take 0.5 tablets (2 mg total) by mouth 3 (three) times daily., Disp: 90 tablet, Rfl: 2    pantoprazole (PROTONIX) 40 MG tablet, Take 1 tablet (40 mg total) by mouth once daily., Disp: 90 tablet, Rfl: 1    potassium chloride SA (K-DUR,KLOR-CON) 20 MEQ tablet, Take 1 tablet (20 mEq total) by mouth 3 (three) times daily., Disp: 6 tablet, Rfl: 1    nicotine (NICODERM CQ) 14 mg/24 hr, Place 1 patch onto the skin once daily. (Patient not taking: Reported on 2/27/2023), Disp: 30 patch, Rfl: 1    ondansetron (ZOFRAN-ODT) 4 MG TbDL, Take 2 tablets (8 mg total) by mouth 2 (two) times daily. (Patient not taking: Reported on 1/26/2023), Disp: 20 tablet, Rfl: 0           Review of Systems   All other systems reviewed and are negative.             Vitals:    02/27/23 1631   BP: 138/88   BP Location: Left arm   Patient Position: Sitting   BP Method: Large (Manual)   Pulse: 83   Resp: 18   Temp: 97.4 °F (36.3 °C)   TempSrc: Temporal   SpO2: 99%   Weight: 68.4 kg (150 lb 12.8 oz)   Height: 4' 11" (1.499 m)        Physical Exam  Vitals reviewed.   Constitutional:  "      Appearance: Normal appearance. She is normal weight.   Cardiovascular:      Rate and Rhythm: Normal rate and regular rhythm.      Pulses: Normal pulses.      Heart sounds: Normal heart sounds.   Pulmonary:      Effort: Pulmonary effort is normal.      Breath sounds: Normal breath sounds.   Abdominal:      General: Abdomen is flat. Bowel sounds are normal.      Palpations: Abdomen is soft.   Musculoskeletal:         General: Normal range of motion.      Cervical back: Normal range of motion and neck supple.   Skin:     General: Skin is warm and dry.   Neurological:      General: No focal deficit present.      Mental Status: She is alert and oriented to person, place, and time. Mental status is at baseline.         Last Labs:     Hospital Outpatient Visit on 02/02/2023   Component Date Value    PT 02/02/2023 11.9     INR 02/02/2023 0.91     PTT 02/02/2023 30.0     WBC 02/02/2023 7.22     RBC 02/02/2023 4.86     Hemoglobin 02/02/2023 14.1     Hematocrit 02/02/2023 43.0     MCV 02/02/2023 88.5     MCH 02/02/2023 29.0     MCHC 02/02/2023 32.8     RDW 02/02/2023 15.6 (H)     Platelet Count 02/02/2023 287     MPV 02/02/2023 9.3 (L)     Neutrophils % 02/02/2023 55.2     Lymphocytes % 02/02/2023 32.5     Monocytes % 02/02/2023 10.1 (H)     Eosinophils % 02/02/2023 1.5     Basophils % 02/02/2023 0.4     Immature Granulocytes % 02/02/2023 0.3     nRBC, Auto 02/02/2023 0.0     Neutrophils, Abs 02/02/2023 3.98     Lymphocytes, Absolute 02/02/2023 2.35     Monocytes, Absolute 02/02/2023 0.73     Eosinophils, Absolute 02/02/2023 0.11     Basophils, Absolute 02/02/2023 0.03     Immature Granulocytes, A* 02/02/2023 0.02     nRBC, Absolute 02/02/2023 0.00     Diff Type 02/02/2023 Auto     Case Report 02/02/2023                      Value:Surgical Pathology                                Case: [id number]                                   Authorizing Provider:  William Muniz MD    Collected:           02/02/2023 11:11 AM           Ordering Location:     [facility] - [facility]  Received:            02/02/2023 12:45 PM                                 Scan                                                                         Pathologist:           Ervin Choe MD                                                       Specimen:    Lung                                                                                       Final Diagnosis 02/02/2023                      Value:This result contains rich text formatting which cannot be displayed here.    Comments 02/02/2023                      Value:This result contains rich text formatting which cannot be displayed here.    Gross Description 02/02/2023                      Value:This result contains rich text formatting which cannot be displayed here.    Microscopic Description 02/02/2023                      Value:This result contains rich text formatting which cannot be displayed here.    Clinical Information 02/02/2023                      Value:This result contains rich text formatting which cannot be displayed here.    Laboratory Notes 02/02/2023                      Value:This result contains rich text formatting which cannot be displayed here.       Last Imaging:  XRAY Chest 2 View Inspiration Expiration  Narrative: EXAMINATION:  XR CHEST 2 VIEW INSPIRATION EXPIRATION    CLINICAL HISTORY:  post lung biopsy;    TECHNIQUE:  Inspiration and expiration AP views of the chest    COMPARISON:  Earlier today    FINDINGS:  The right upper lobe nodule is not well seen.  No pneumothorax status post biopsy.  Impression: No pneumothorax.    Electronically signed by: Uday Mendoza  Date:    02/02/2023  Time:    14:51  CT Biopsy Lung w/ guidance  Narrative: EXAMINATION:  CT BIOPSY LUNG W/ GUIDANCE    CLINICAL HISTORY:  Solitary pulmonary nodule.    Patient with a right upper lobe noncalcified nodule discovered recently on imaging presenting for biopsy.    TECHNIQUE:  Risk, benefits, and  alternatives were explained to the patient and informed consent obtained.  Patient brought to the CT scanning suite and placed prone on the CT scanning table.  A formal time-out was performed.  A preliminary CT show the right upper lobe nodule and an appropriate site was marked.  The overlying skin was prepped and draped in routine sterile fashion.  Lidocaine utilized for local anesthetic.  Under CT guidance, a 20 gauge biopsy device was directed towards the lesion.  A core biopsy was obtained.  This was placed into formalin.  This was repeated for a total of 3 core biopsies.  The needle was removed.  Sterile dressing applied.  Patient tolerated procedure well.  A follow-up chest CT showed no acute complication.    Please note that IUday M.D., was present for and performed the entire procedure.  Please note the conscious sedation was provided by dedicated nurse under my supervision.    The CT examination was performed using one or more of the following dose reduction techniques: Automated exposure control, adjustment of the mA and kV according to patient's size, use of acute or iterative reconstruction techniques.    COMPARISON:  CT 01/12/2023.    FINDINGS:  CT scanning demonstrated the right upper lobe nodule.  CT scanning showed appropriate needle placement within the lesion.  Follow-up CT showed no acute complication.  Impression: Technically successful CT-guided right upper lobe nodule biopsy.    Electronically signed by: Uday Mendoza  Date:    02/02/2023  Time:    13:14  XRAY Chest 2 View Inspiration Expiration  Narrative: EXAMINATION:  XR CHEST 2 VIEW INSPIRATION EXPIRATION    CLINICAL HISTORY:  post lung biopsy;    TECHNIQUE:  Inspiration and expiration AP views of the chest    COMPARISON:  12/28/2022    FINDINGS:  Postprocedural changes from right upper lobe biopsy.  No pneumothorax.  Left lung clear.  Impression: No pneumothorax.    Electronically signed by: Uday  Ray  Date:    02/02/2023  Time:    12:31         **Labs and x-rays personally reviewed by me    ** reviewed      Objective:        Assessment:       1. Hypokalemia        2. Acute bilateral low back pain without sciatica             Plan:         [unfilled]

## 2023-02-28 NOTE — PATIENT INSTRUCTIONS
Faviola was seen today for follow-up and shoulder pain.    Diagnoses and all orders for this visit:    Hypokalemia    Acute bilateral low back pain without sciatica

## 2023-03-22 ENCOUNTER — OFFICE VISIT (OUTPATIENT)
Dept: GASTROENTEROLOGY | Facility: CLINIC | Age: 63
End: 2023-03-22
Payer: COMMERCIAL

## 2023-03-22 VITALS
BODY MASS INDEX: 30.24 KG/M2 | OXYGEN SATURATION: 97 % | DIASTOLIC BLOOD PRESSURE: 97 MMHG | SYSTOLIC BLOOD PRESSURE: 162 MMHG | HEART RATE: 83 BPM | HEIGHT: 59 IN | WEIGHT: 150 LBS

## 2023-03-22 DIAGNOSIS — R63.4 WEIGHT LOSS: ICD-10-CM

## 2023-03-22 DIAGNOSIS — K21.9 GASTROESOPHAGEAL REFLUX DISEASE, UNSPECIFIED WHETHER ESOPHAGITIS PRESENT: Primary | ICD-10-CM

## 2023-03-22 DIAGNOSIS — R91.8 LUNG MASS: ICD-10-CM

## 2023-03-22 PROCEDURE — 1160F RVW MEDS BY RX/DR IN RCRD: CPT | Mod: CPTII,,, | Performed by: NURSE PRACTITIONER

## 2023-03-22 PROCEDURE — 3080F DIAST BP >= 90 MM HG: CPT | Mod: CPTII,,, | Performed by: NURSE PRACTITIONER

## 2023-03-22 PROCEDURE — 1159F PR MEDICATION LIST DOCUMENTED IN MEDICAL RECORD: ICD-10-PCS | Mod: CPTII,,, | Performed by: NURSE PRACTITIONER

## 2023-03-22 PROCEDURE — 99214 OFFICE O/P EST MOD 30 MIN: CPT | Mod: PBBFAC | Performed by: NURSE PRACTITIONER

## 2023-03-22 PROCEDURE — 99214 PR OFFICE/OUTPT VISIT, EST, LEVL IV, 30-39 MIN: ICD-10-PCS | Mod: S$PBB,,, | Performed by: NURSE PRACTITIONER

## 2023-03-22 PROCEDURE — 3080F PR MOST RECENT DIASTOLIC BLOOD PRESSURE >= 90 MM HG: ICD-10-PCS | Mod: CPTII,,, | Performed by: NURSE PRACTITIONER

## 2023-03-22 PROCEDURE — 1159F MED LIST DOCD IN RCRD: CPT | Mod: CPTII,,, | Performed by: NURSE PRACTITIONER

## 2023-03-22 PROCEDURE — 3077F PR MOST RECENT SYSTOLIC BLOOD PRESSURE >= 140 MM HG: ICD-10-PCS | Mod: CPTII,,, | Performed by: NURSE PRACTITIONER

## 2023-03-22 PROCEDURE — 99214 OFFICE O/P EST MOD 30 MIN: CPT | Mod: S$PBB,,, | Performed by: NURSE PRACTITIONER

## 2023-03-22 PROCEDURE — 3077F SYST BP >= 140 MM HG: CPT | Mod: CPTII,,, | Performed by: NURSE PRACTITIONER

## 2023-03-22 PROCEDURE — 3008F BODY MASS INDEX DOCD: CPT | Mod: CPTII,,, | Performed by: NURSE PRACTITIONER

## 2023-03-22 PROCEDURE — 1160F PR REVIEW ALL MEDS BY PRESCRIBER/CLIN PHARMACIST DOCUMENTED: ICD-10-PCS | Mod: CPTII,,, | Performed by: NURSE PRACTITIONER

## 2023-03-22 PROCEDURE — 3008F PR BODY MASS INDEX (BMI) DOCUMENTED: ICD-10-PCS | Mod: CPTII,,, | Performed by: NURSE PRACTITIONER

## 2023-03-22 NOTE — PROGRESS NOTES
Faviola King is a 62 y.o. female here for Follow-up (3mo f/u and lower abdomen pain.)        PCP: Shantanu Larson  Referring Provider: No referring provider defined for this encounter.     HPI:  Presents for follow-up due to weight loss.  Patient's weight has increased approximately 6 lb since the last visit.  During evaluation for the weight loss the patient did have a chest x-ray that was abnormal CT chest was recommended due to a lung nodule.  CT chest was completed further recommendation for biopsy was given.  The patient has been evaluated by Dr. Muniz.  The biopsy returned ,Markedly atypical epithelial neoplasm suspicious for malignancy. She is scheduled to see Oncology.  She states that her appetite has improved and that she is only taking the Periactin as needed.  She did have a colonoscopy 08/10/2022, tubular adenoma removed.  Recommendation to repeat colonoscopy in 3 years. She denies any GI complaints today and is in good spirits.  CBC from 02/02/2023 reviewed, no anemia noted.  She does report that she has some intermittent pain on the left side that starts at the shoulder blade and goes down to the left pelvic area.  States that this has been ongoing for several    Follow-up  Associated symptoms include abdominal pain (intermittent). Pertinent negatives include no change in bowel habit, chest pain, fatigue, fever, nausea or vomiting.       ROS:  Review of Systems   Constitutional:  Negative for appetite change, fatigue, fever and unexpected weight change.   HENT:  Negative for trouble swallowing.    Respiratory:  Negative for shortness of breath.    Cardiovascular:  Negative for chest pain.   Gastrointestinal:  Positive for abdominal pain (intermittent). Negative for blood in stool, change in bowel habit, constipation, diarrhea, nausea, vomiting, reflux and change in bowel habit.   Musculoskeletal:  Negative for gait problem.   Integumentary:  Negative for pallor.   Neurological:  Negative for  "dizziness and light-headedness.   Psychiatric/Behavioral:  The patient is not nervous/anxious.         PMHX:  has a past medical history of GERD (gastroesophageal reflux disease), Heart attack (2017), Hypertension, and Nodule of upper lobe of right lung.    PSHX:  has a past surgical history that includes Hysterectomy and  section.    PFHX: family history is not on file.    PSlHX:  reports that she has been smoking cigarettes. She has a 40.00 pack-year smoking history. She has never used smokeless tobacco. She reports current drug use. Drug: Marijuana. She reports that she does not drink alcohol.        Review of patient's allergies indicates:  No Known Allergies    Medication List with Changes/Refills   Current Medications    ALPRAZOLAM (XANAX) 0.25 MG TABLET    Take 1 tablet (0.25 mg total) by mouth nightly as needed for Anxiety.    AMLODIPINE (NORVASC) 10 MG TABLET    Take 1 tablet (10 mg total) by mouth once daily.    CARVEDILOL (COREG) 6.25 MG TABLET    Take 1 tablet by mouth twice daily    CYPROHEPTADINE (PERIACTIN) 4 MG TABLET    Take 0.5 tablets (2 mg total) by mouth 3 (three) times daily.    NICOTINE (NICODERM CQ) 14 MG/24 HR    Place 1 patch onto the skin once daily.    ONDANSETRON (ZOFRAN-ODT) 4 MG TBDL    Take 2 tablets (8 mg total) by mouth 2 (two) times daily.    PANTOPRAZOLE (PROTONIX) 40 MG TABLET    Take 1 tablet (40 mg total) by mouth once daily.    POTASSIUM CHLORIDE SA (K-DUR,KLOR-CON) 20 MEQ TABLET    Take 1 tablet (20 mEq total) by mouth 3 (three) times daily.        Objective Findings:  Vital Signs:  BP (!) 162/97 (BP Location: Left arm)   Pulse 83   Ht 4' 11" (1.499 m)   Wt 68 kg (150 lb)   SpO2 97%   BMI 30.30 kg/m²  Body mass index is 30.3 kg/m².    Physical Exam:  Physical Exam  Vitals and nursing note reviewed.   Constitutional:       General: She is not in acute distress.     Appearance: Normal appearance.   HENT:      Mouth/Throat:      Mouth: Mucous membranes are moist. "   Cardiovascular:      Rate and Rhythm: Normal rate.   Pulmonary:      Effort: Pulmonary effort is normal.      Breath sounds: No wheezing, rhonchi or rales.   Abdominal:      General: Bowel sounds are normal. There is no distension.      Palpations: Abdomen is soft. There is no mass.      Tenderness: There is no abdominal tenderness.   Skin:     General: Skin is warm and dry.      Coloration: Skin is not jaundiced or pale.   Neurological:      Mental Status: She is alert and oriented to person, place, and time.   Psychiatric:         Mood and Affect: Mood normal.        Labs:  Lab Results   Component Value Date    WBC 7.22 02/02/2023    HGB 14.1 02/02/2023    HCT 43.0 02/02/2023    MCV 88.5 02/02/2023    RDW 15.6 (H) 02/02/2023     02/02/2023    LYMPH 32.5 02/02/2023    LYMPH 2.35 02/02/2023    MONO 10.1 (H) 02/02/2023    EOS 0.11 02/02/2023    BASO 0.03 02/02/2023     Lab Results   Component Value Date     08/25/2022    K 4.0 09/07/2022     08/25/2022    CO2 28 08/25/2022    GLU 90 08/25/2022    BUN 13 08/25/2022    CREATININE 0.82 08/25/2022    CALCIUM 9.7 08/25/2022    PROT 8.4 (H) 07/03/2022    ALBUMIN 3.8 07/03/2022    BILITOT 0.3 07/03/2022    ALKPHOS 71 07/03/2022    AST 23 07/03/2022    ALT 14 07/03/2022         Imaging: No results found.      Assessment:  Faviola King is a 62 y.o. female here with:  1. Gastroesophageal reflux disease, unspecified whether esophagitis present    2. Weight loss    3. Lung mass          Recommendations:  1. May continue Protonix  2. Avoid NSAID's  3. May take Periactin as needed due to decreased appetite  4. Keep oncology appointments as directed. Follow up with Dr. Muniz as directed    Follow up in about 6 months (around 9/22/2023).      Order summary:       Thank you for allowing me to participate in the care of Faviola King.      HERMELINDO Francisco

## 2023-04-04 ENCOUNTER — PATIENT MESSAGE (OUTPATIENT)
Dept: RESEARCH | Facility: HOSPITAL | Age: 63
End: 2023-04-04

## 2023-05-25 ENCOUNTER — OFFICE VISIT (OUTPATIENT)
Dept: FAMILY MEDICINE | Facility: CLINIC | Age: 63
End: 2023-05-25
Payer: COMMERCIAL

## 2023-05-25 VITALS
DIASTOLIC BLOOD PRESSURE: 90 MMHG | OXYGEN SATURATION: 99 % | HEART RATE: 96 BPM | BODY MASS INDEX: 29.72 KG/M2 | SYSTOLIC BLOOD PRESSURE: 153 MMHG | HEIGHT: 59 IN | TEMPERATURE: 98 F | RESPIRATION RATE: 18 BRPM | WEIGHT: 147.44 LBS

## 2023-05-25 DIAGNOSIS — Z01.818 PRE-OPERATIVE CLEARANCE: Primary | ICD-10-CM

## 2023-05-25 DIAGNOSIS — Z11.59 NEED FOR HEPATITIS C SCREENING TEST: ICD-10-CM

## 2023-05-25 DIAGNOSIS — Z11.4 SCREENING FOR HIV (HUMAN IMMUNODEFICIENCY VIRUS): ICD-10-CM

## 2023-05-25 DIAGNOSIS — Z12.39 ENCOUNTER FOR SCREENING FOR MALIGNANT NEOPLASM OF BREAST, UNSPECIFIED SCREENING MODALITY: ICD-10-CM

## 2023-05-25 DIAGNOSIS — R94.31 ABNORMAL EKG: ICD-10-CM

## 2023-05-25 LAB
ANION GAP SERPL CALCULATED.3IONS-SCNC: 10 MMOL/L (ref 7–16)
BACTERIA #/AREA URNS HPF: ABNORMAL /HPF
BASOPHILS # BLD AUTO: 0.04 K/UL (ref 0–0.2)
BASOPHILS NFR BLD AUTO: 0.5 % (ref 0–1)
BILIRUB UR QL STRIP: NEGATIVE
BUN SERPL-MCNC: 12 MG/DL (ref 7–18)
BUN/CREAT SERPL: 16 (ref 6–20)
CALCIUM SERPL-MCNC: 9.8 MG/DL (ref 8.5–10.1)
CHLORIDE SERPL-SCNC: 107 MMOL/L (ref 98–107)
CLARITY UR: CLEAR
CO2 SERPL-SCNC: 28 MMOL/L (ref 21–32)
COLOR UR: ABNORMAL
CREAT SERPL-MCNC: 0.75 MG/DL (ref 0.55–1.02)
DIFFERENTIAL METHOD BLD: ABNORMAL
EGFR (NO RACE VARIABLE) (RUSH/TITUS): 89 ML/MIN/1.73M2
EKG 12-LEAD: ABNORMAL
EOSINOPHIL # BLD AUTO: 0.08 K/UL (ref 0–0.5)
EOSINOPHIL NFR BLD AUTO: 1 % (ref 1–4)
ERYTHROCYTE [DISTWIDTH] IN BLOOD BY AUTOMATED COUNT: 14.1 % (ref 11.5–14.5)
GLUCOSE SERPL-MCNC: 83 MG/DL (ref 74–106)
GLUCOSE UR STRIP-MCNC: NORMAL MG/DL
HCT VFR BLD AUTO: 44.8 % (ref 38–47)
HCV AB SER QL: NORMAL
HEART RATE: 71
HGB BLD-MCNC: 13.7 G/DL (ref 12–16)
HIV 1+O+2 AB SERPL QL: NORMAL
IMM GRANULOCYTES # BLD AUTO: 0.02 K/UL (ref 0–0.04)
IMM GRANULOCYTES NFR BLD: 0.2 % (ref 0–0.4)
KETONES UR STRIP-SCNC: NEGATIVE MG/DL
LEUKOCYTE ESTERASE UR QL STRIP: ABNORMAL
LYMPHOCYTES # BLD AUTO: 2.64 K/UL (ref 1–4.8)
LYMPHOCYTES NFR BLD AUTO: 32.4 % (ref 27–41)
Lab: ABNORMAL
MCH RBC QN AUTO: 29 PG (ref 27–31)
MCHC RBC AUTO-ENTMCNC: 30.6 G/DL (ref 32–36)
MCV RBC AUTO: 94.9 FL (ref 80–96)
MONOCYTES # BLD AUTO: 0.86 K/UL (ref 0–0.8)
MONOCYTES NFR BLD AUTO: 10.5 % (ref 2–6)
MPC BLD CALC-MCNC: 10 FL (ref 9.4–12.4)
MUCOUS, UA: ABNORMAL /LPF
NEUTROPHILS # BLD AUTO: 4.52 K/UL (ref 1.8–7.7)
NEUTROPHILS NFR BLD AUTO: 55.4 % (ref 53–65)
NITRITE UR QL STRIP: NEGATIVE
NRBC # BLD AUTO: 0 X10E3/UL
NRBC, AUTO (.00): 0 %
PH UR STRIP: 7.5 PH UNITS
PLATELET # BLD AUTO: 270 K/UL (ref 150–400)
POTASSIUM SERPL-SCNC: 2.7 MMOL/L (ref 3.5–5.1)
PR INTERVAL: ABNORMAL
PROT UR QL STRIP: NEGATIVE
PRT AXES: ABNORMAL
QRS DURATION: 108
QT/QTC: ABNORMAL
RBC # BLD AUTO: 4.72 M/UL (ref 4.2–5.4)
RBC # UR STRIP: ABNORMAL /UL
RBC #/AREA URNS HPF: 6 /HPF
SODIUM SERPL-SCNC: 142 MMOL/L (ref 136–145)
SP GR UR STRIP: 1.01
SQUAMOUS #/AREA URNS LPF: ABNORMAL /HPF
UROBILINOGEN UR STRIP-ACNC: NORMAL MG/DL
VENTRICULAR RATE: ABNORMAL
WBC # BLD AUTO: 8.16 K/UL (ref 4.5–11)
WBC #/AREA URNS HPF: 15 /HPF

## 2023-05-25 PROCEDURE — 1159F PR MEDICATION LIST DOCUMENTED IN MEDICAL RECORD: ICD-10-PCS | Mod: CPTII,,, | Performed by: INTERNAL MEDICINE

## 2023-05-25 PROCEDURE — 3008F PR BODY MASS INDEX (BMI) DOCUMENTED: ICD-10-PCS | Mod: CPTII,,, | Performed by: INTERNAL MEDICINE

## 2023-05-25 PROCEDURE — 87389 HIV-1 AG W/HIV-1&-2 AB AG IA: CPT | Mod: ,,, | Performed by: CLINICAL MEDICAL LABORATORY

## 2023-05-25 PROCEDURE — 80048 BASIC METABOLIC PNL TOTAL CA: CPT | Mod: ,,, | Performed by: CLINICAL MEDICAL LABORATORY

## 2023-05-25 PROCEDURE — 3077F SYST BP >= 140 MM HG: CPT | Mod: CPTII,,, | Performed by: INTERNAL MEDICINE

## 2023-05-25 PROCEDURE — 85025 COMPLETE CBC W/AUTO DIFF WBC: CPT | Mod: ,,, | Performed by: CLINICAL MEDICAL LABORATORY

## 2023-05-25 PROCEDURE — 1159F MED LIST DOCD IN RCRD: CPT | Mod: CPTII,,, | Performed by: INTERNAL MEDICINE

## 2023-05-25 PROCEDURE — 81001 URINALYSIS AUTO W/SCOPE: CPT | Mod: ,,, | Performed by: CLINICAL MEDICAL LABORATORY

## 2023-05-25 PROCEDURE — 86803 HEPATITIS C AB TEST: CPT | Mod: ,,, | Performed by: CLINICAL MEDICAL LABORATORY

## 2023-05-25 PROCEDURE — 87389 HIV 1 / 2 ANTIBODY: ICD-10-PCS | Mod: ,,, | Performed by: CLINICAL MEDICAL LABORATORY

## 2023-05-25 PROCEDURE — 85025 CBC WITH DIFFERENTIAL: ICD-10-PCS | Mod: ,,, | Performed by: CLINICAL MEDICAL LABORATORY

## 2023-05-25 PROCEDURE — 81001 URINALYSIS, REFLEX TO URINE CULTURE: ICD-10-PCS | Mod: ,,, | Performed by: CLINICAL MEDICAL LABORATORY

## 2023-05-25 PROCEDURE — 86803 HEPATITIS C ANTIBODY: ICD-10-PCS | Mod: ,,, | Performed by: CLINICAL MEDICAL LABORATORY

## 2023-05-25 PROCEDURE — 1160F RVW MEDS BY RX/DR IN RCRD: CPT | Mod: CPTII,,, | Performed by: INTERNAL MEDICINE

## 2023-05-25 PROCEDURE — 93000 POCT EKG 12-LEAD: ICD-10-PCS | Mod: ,,, | Performed by: INTERNAL MEDICINE

## 2023-05-25 PROCEDURE — 99214 PR OFFICE/OUTPT VISIT, EST, LEVL IV, 30-39 MIN: ICD-10-PCS | Mod: ,,, | Performed by: INTERNAL MEDICINE

## 2023-05-25 PROCEDURE — 80048 BASIC METABOLIC PANEL: ICD-10-PCS | Mod: ,,, | Performed by: CLINICAL MEDICAL LABORATORY

## 2023-05-25 PROCEDURE — 3077F PR MOST RECENT SYSTOLIC BLOOD PRESSURE >= 140 MM HG: ICD-10-PCS | Mod: CPTII,,, | Performed by: INTERNAL MEDICINE

## 2023-05-25 PROCEDURE — 87086 URINE CULTURE/COLONY COUNT: CPT | Mod: ,,, | Performed by: CLINICAL MEDICAL LABORATORY

## 2023-05-25 PROCEDURE — 3080F PR MOST RECENT DIASTOLIC BLOOD PRESSURE >= 90 MM HG: ICD-10-PCS | Mod: CPTII,,, | Performed by: INTERNAL MEDICINE

## 2023-05-25 PROCEDURE — 87086 CULTURE, URINE: ICD-10-PCS | Mod: ,,, | Performed by: CLINICAL MEDICAL LABORATORY

## 2023-05-25 PROCEDURE — 3080F DIAST BP >= 90 MM HG: CPT | Mod: CPTII,,, | Performed by: INTERNAL MEDICINE

## 2023-05-25 PROCEDURE — 99214 OFFICE O/P EST MOD 30 MIN: CPT | Mod: ,,, | Performed by: INTERNAL MEDICINE

## 2023-05-25 PROCEDURE — 1160F PR REVIEW ALL MEDS BY PRESCRIBER/CLIN PHARMACIST DOCUMENTED: ICD-10-PCS | Mod: CPTII,,, | Performed by: INTERNAL MEDICINE

## 2023-05-25 PROCEDURE — 3008F BODY MASS INDEX DOCD: CPT | Mod: CPTII,,, | Performed by: INTERNAL MEDICINE

## 2023-05-25 PROCEDURE — 93000 ELECTROCARDIOGRAM COMPLETE: CPT | Mod: ,,, | Performed by: INTERNAL MEDICINE

## 2023-05-25 NOTE — PROGRESS NOTES
Care gaps discussed with patient. Patient is due for hep c screening, will order today. Patient states she is up to date on Covid vaccine. Patient is due for HIV screening, will order today. Patient states she is unsure if she has had tetanus vaccine. Patient is due for mammogram, will order today. Patient is due for shingles vaccine, patient states she will hold off for now.

## 2023-05-26 RX ORDER — CYPROHEPTADINE HYDROCHLORIDE 4 MG/1
2 TABLET ORAL 3 TIMES DAILY
Qty: 90 TABLET | Refills: 2 | Status: SHIPPED | OUTPATIENT
Start: 2023-05-26 | End: 2023-09-22

## 2023-05-26 RX ORDER — ALPRAZOLAM 0.25 MG/1
0.25 TABLET ORAL NIGHTLY PRN
Qty: 30 TABLET | Refills: 1 | Status: SHIPPED | OUTPATIENT
Start: 2023-05-26

## 2023-05-26 RX ORDER — PANTOPRAZOLE SODIUM 40 MG/1
40 TABLET, DELAYED RELEASE ORAL DAILY
Qty: 90 TABLET | Refills: 1 | Status: SHIPPED | OUTPATIENT
Start: 2023-05-26 | End: 2024-03-18 | Stop reason: SDUPTHER

## 2023-05-26 RX ORDER — CARVEDILOL 6.25 MG/1
6.25 TABLET ORAL 2 TIMES DAILY
Qty: 180 TABLET | Refills: 0 | Status: SHIPPED | OUTPATIENT
Start: 2023-05-26 | End: 2023-08-14

## 2023-05-26 RX ORDER — POTASSIUM CHLORIDE 20 MEQ/1
20 TABLET, EXTENDED RELEASE ORAL 3 TIMES DAILY
Qty: 180 TABLET | Refills: 1 | Status: SHIPPED | OUTPATIENT
Start: 2023-05-26 | End: 2024-01-24 | Stop reason: SDUPTHER

## 2023-05-26 RX ORDER — AMLODIPINE BESYLATE 10 MG/1
10 TABLET ORAL DAILY
Qty: 90 TABLET | Refills: 1 | Status: SHIPPED | OUTPATIENT
Start: 2023-05-26 | End: 2024-03-18 | Stop reason: SDUPTHER

## 2023-05-27 LAB — UA COMPLETE W REFLEX CULTURE PNL UR: NORMAL

## 2023-05-30 ENCOUNTER — TELEPHONE (OUTPATIENT)
Dept: FAMILY MEDICINE | Facility: CLINIC | Age: 63
End: 2023-05-30
Payer: COMMERCIAL

## 2023-05-30 PROBLEM — R94.31 ABNORMAL EKG: Status: ACTIVE | Noted: 2023-05-30

## 2023-05-30 PROBLEM — Z11.59 NEED FOR HEPATITIS C SCREENING TEST: Status: ACTIVE | Noted: 2023-05-30

## 2023-05-30 PROBLEM — Z12.39 ENCOUNTER FOR SCREENING FOR MALIGNANT NEOPLASM OF BREAST: Status: ACTIVE | Noted: 2022-05-24

## 2023-05-30 PROBLEM — Z11.4 SCREENING FOR HIV (HUMAN IMMUNODEFICIENCY VIRUS): Status: ACTIVE | Noted: 2022-05-24

## 2023-05-30 PROBLEM — Z01.818 PRE-OPERATIVE CLEARANCE: Status: ACTIVE | Noted: 2023-05-30

## 2023-05-30 NOTE — PROGRESS NOTES
Subjective:       Patient ID: Faviola King is a 62 y.o. female.    Chief Complaint: Procedure (Clearance )    HPI  .  Patient seen and evaluated today.  Patient is here for a preop clearance.  However patient does complain of chest pain.  Patient also has a history of lung cancer and now needs surgical clearance because patient will require a lobectomy.  The plan refer to Cardiology due to the chest pain, order adenosine stress test.  Additional plans for pre clearance will be BNP CBC, PT PTT UA CNS EKG.  Additional plans health maintenance check for hepatitis-C, HIV and a mammogram today.    Also going to order echocardiogram, EKG EKG was found to be abnormal    Current Medications:    Current Outpatient Medications:     ALPRAZolam (XANAX) 0.25 MG tablet, Take 1 tablet (0.25 mg total) by mouth nightly as needed for Anxiety., Disp: 30 tablet, Rfl: 1    amLODIPine (NORVASC) 10 MG tablet, Take 1 tablet (10 mg total) by mouth once daily., Disp: 90 tablet, Rfl: 1    carvediloL (COREG) 6.25 MG tablet, Take 1 tablet (6.25 mg total) by mouth 2 (two) times daily., Disp: 180 tablet, Rfl: 0    cyproheptadine (PERIACTIN) 4 mg tablet, Take 0.5 tablets (2 mg total) by mouth 3 (three) times daily., Disp: 90 tablet, Rfl: 2    nicotine (NICODERM CQ) 14 mg/24 hr, Place 1 patch onto the skin once daily. (Patient not taking: Reported on 2/27/2023), Disp: 30 patch, Rfl: 1    ondansetron (ZOFRAN-ODT) 4 MG TbDL, Take 2 tablets (8 mg total) by mouth 2 (two) times daily. (Patient not taking: Reported on 1/26/2023), Disp: 20 tablet, Rfl: 0    pantoprazole (PROTONIX) 40 MG tablet, Take 1 tablet (40 mg total) by mouth once daily., Disp: 90 tablet, Rfl: 1    potassium chloride SA (K-DUR,KLOR-CON) 20 MEQ tablet, Take 1 tablet (20 mEq total) by mouth 3 (three) times daily., Disp: 180 tablet, Rfl: 1           Review of Systems   Constitutional:  Negative for appetite change, fatigue and fever.   Respiratory:  Negative for shortness of breath.   "  Cardiovascular:  Negative for chest pain.   Gastrointestinal:  Negative for abdominal pain and constipation.   Endocrine: Negative for polydipsia, polyphagia and polyuria.   Genitourinary:  Negative for difficulty urinating, frequency and hot flashes.   Allergic/Immunologic: Negative for environmental allergies.   Neurological:  Negative for dizziness and light-headedness.   Psychiatric/Behavioral:  Negative for agitation.               Vitals:    05/25/23 1434 05/25/23 1436   BP: (!) 152/97 (!) 153/90   BP Location: Right arm Left arm   Patient Position: Sitting    BP Method: Large (Automatic)    Pulse: 96    Resp: 18    Temp: 97.7 °F (36.5 °C)    TempSrc: Temporal    SpO2: 99%    Weight: 66.9 kg (147 lb 6.6 oz)    Height: 4' 11" (1.499 m)         Physical Exam  Vitals and nursing note reviewed.   Constitutional:       Appearance: Normal appearance.   HENT:      Head: Normocephalic and atraumatic.      Right Ear: Tympanic membrane, ear canal and external ear normal.      Left Ear: Tympanic membrane, ear canal and external ear normal.      Nose: Nose normal.      Mouth/Throat:      Mouth: Mucous membranes are moist.      Pharynx: Oropharynx is clear.   Eyes:      Extraocular Movements: Extraocular movements intact.      Conjunctiva/sclera: Conjunctivae normal.      Pupils: Pupils are equal, round, and reactive to light.   Cardiovascular:      Rate and Rhythm: Normal rate and regular rhythm.      Pulses: Normal pulses.      Heart sounds: Normal heart sounds.   Pulmonary:      Effort: Pulmonary effort is normal.      Breath sounds: Normal breath sounds.   Abdominal:      General: Abdomen is flat. Bowel sounds are normal.      Palpations: Abdomen is soft.   Musculoskeletal:         General: Normal range of motion.      Cervical back: Normal range of motion and neck supple.   Skin:     General: Skin is warm and dry.   Neurological:      General: No focal deficit present.      Mental Status: She is alert. Mental status " is at baseline. She is disoriented.   Psychiatric:         Mood and Affect: Mood normal.         Behavior: Behavior normal.         Thought Content: Thought content normal.         Judgment: Judgment normal.         Last Labs:     Office Visit on 05/25/2023   Component Date Value    [place] Interval 05/25/2023 128/172     QRS Duration 05/25/2023 108     QT/QTc 05/25/2023 436/474     PRT Axes 05/25/2023 10/-54/34     Heart Rate 05/25/2023 71     Results 05/25/2023      Sodium 05/25/2023 142     Potassium 05/25/2023 2.7 (L)     Chloride 05/25/2023 107     CO2 05/25/2023 28     Anion Gap 05/25/2023 10     Glucose 05/25/2023 83     BUN 05/25/2023 12     Creatinine 05/25/2023 0.75     BUN/Creatinine Ratio 05/25/2023 16     Calcium 05/25/2023 9.8     eGFR 05/25/2023 89     Color, UA 05/25/2023 Light-Yellow     Clarity, UA 05/25/2023 Clear     pH, UA 05/25/2023 7.5     Leukocytes, UA 05/25/2023 Large (A)     Nitrites, UA 05/25/2023 Negative     Protein, UA 05/25/2023 Negative     Glucose, UA 05/25/2023 Normal     Ketones, UA 05/25/2023 Negative     Urobilinogen, UA 05/25/2023 Normal     Bilirubin, UA 05/25/2023 Negative     Blood, UA 05/25/2023 Trace (A)     Specific [place], UA 05/25/2023 1.015     Hepatitis C Ab 05/25/2023 Non-Reactive     HIV 1/2 05/25/2023 Non-Reactive     WBC 05/25/2023 8.16     RBC 05/25/2023 4.72     Hemoglobin 05/25/2023 13.7     Hematocrit 05/25/2023 44.8     MCV 05/25/2023 94.9     MCH 05/25/2023 29.0     MCHC 05/25/2023 30.6 (L)     RDW 05/25/2023 14.1     Platelet Count 05/25/2023 270     MPV 05/25/2023 10.0     Neutrophils % 05/25/2023 55.4     Lymphocytes % 05/25/2023 32.4     Monocytes % 05/25/2023 10.5 (H)     Eosinophils % 05/25/2023 1.0     Basophils % 05/25/2023 0.5     Immature Granulocytes % 05/25/2023 0.2     nRBC, Auto 05/25/2023 0.0     Neutrophils, Abs 05/25/2023 4.52     Lymphocytes, Absolute 05/25/2023 2.64     Monocytes, Absolute 05/25/2023 0.86 (H)     Eosinophils, Absolute  05/25/2023 0.08     Basophils, Absolute 05/25/2023 0.04     Immature Granulocytes, A* 05/25/2023 0.02     nRBC, Absolute 05/25/2023 0.00     Diff Type 05/25/2023 Auto     WBC, UA 05/25/2023 15 (H)     RBC, UA 05/25/2023 6 (H)     Bacteria, UA 05/25/2023 Occasional (A)     Squamous Epithelial Cell* 05/25/2023 Occasional (A)     Mucous 05/25/2023 Occasional (A)     Culture, Urine 05/25/2023 Skin/Urogenital Ashley Isolated, no further workup.        Last Imaging:  X-Ray Chest PA And Lateral  Narrative: EXAMINATION:  XR CHEST PA AND LATERAL    CLINICAL HISTORY:  Encounter for other preprocedural examination    COMPARISON:  Chest x-ray February 2, 2023    TECHNIQUE:  Frontal and lateral views of the chest.    FINDINGS:  The cardiomediastinal silhouette is stable in configuration.  Irregular nodular opacity within the right suprahilar lung with current measurement of approximately 2.3 cm.  Chronic change of the lungs without focal consolidation, pleural effusion, or pneumothorax.  Visualized osseous and surrounding soft tissue structures appear grossly unchanged.  Impression: Irregular nodular opacity within the right suprahilar lung with current measurement of approximately 2.3 cm.  Consider repeat chest CT.    Point of Service: [facility]    Electronically signed by: Dong Riggs  Date:    05/25/2023  Time:    16:04         **Labs and x-rays personally reviewed by me    ** reviewed      Objective:        Assessment:       1. Pre-operative clearance  POCT EKG 12-LEAD (NOT FOR [facility] USE)    Basic Metabolic Panel    CBC Auto Differential    X-Ray Chest PA And Lateral    Urinalysis, Reflex to Urine Culture    Basic Metabolic Panel    CBC Auto Differential    Urinalysis, Reflex to Urine Culture    PT and PTT    Urinalysis, Microscopic    Urine culture      2. Abnormal EKG  NM Myocardial Perfusion Spect Multi Pharmacologic    Nuclear Stress Test    Echo    Ambulatory referral/consult to Cardiology      3.  Encounter for screening for malignant neoplasm of breast, unspecified screening modality  Mammo Digital Screening Bilat      4. Need for hepatitis C screening test  Hepatitis C Antibody    Hepatitis C Antibody      5. Screening for HIV (human immunodeficiency virus)  HIV 1/2 Ag/Ab (4th Gen)    HIV 1/2 Ag/Ab (4th Gen)           Plan:         [unfilled]

## 2023-06-08 ENCOUNTER — OFFICE VISIT (OUTPATIENT)
Dept: FAMILY MEDICINE | Facility: CLINIC | Age: 63
End: 2023-06-08
Payer: COMMERCIAL

## 2023-06-08 VITALS
SYSTOLIC BLOOD PRESSURE: 121 MMHG | WEIGHT: 146.81 LBS | HEIGHT: 59 IN | RESPIRATION RATE: 18 BRPM | BODY MASS INDEX: 29.6 KG/M2 | TEMPERATURE: 98 F | OXYGEN SATURATION: 99 % | DIASTOLIC BLOOD PRESSURE: 92 MMHG | HEART RATE: 85 BPM

## 2023-06-08 DIAGNOSIS — R91.8 LUNG MASS: Primary | ICD-10-CM

## 2023-06-08 PROCEDURE — 3080F DIAST BP >= 90 MM HG: CPT | Mod: CPTII,,, | Performed by: INTERNAL MEDICINE

## 2023-06-08 PROCEDURE — 3080F PR MOST RECENT DIASTOLIC BLOOD PRESSURE >= 90 MM HG: ICD-10-PCS | Mod: CPTII,,, | Performed by: INTERNAL MEDICINE

## 2023-06-08 PROCEDURE — 1159F MED LIST DOCD IN RCRD: CPT | Mod: CPTII,,, | Performed by: INTERNAL MEDICINE

## 2023-06-08 PROCEDURE — 3074F SYST BP LT 130 MM HG: CPT | Mod: CPTII,,, | Performed by: INTERNAL MEDICINE

## 2023-06-08 PROCEDURE — 3074F PR MOST RECENT SYSTOLIC BLOOD PRESSURE < 130 MM HG: ICD-10-PCS | Mod: CPTII,,, | Performed by: INTERNAL MEDICINE

## 2023-06-08 PROCEDURE — 3008F PR BODY MASS INDEX (BMI) DOCUMENTED: ICD-10-PCS | Mod: CPTII,,, | Performed by: INTERNAL MEDICINE

## 2023-06-08 PROCEDURE — 1160F RVW MEDS BY RX/DR IN RCRD: CPT | Mod: CPTII,,, | Performed by: INTERNAL MEDICINE

## 2023-06-08 PROCEDURE — 1160F PR REVIEW ALL MEDS BY PRESCRIBER/CLIN PHARMACIST DOCUMENTED: ICD-10-PCS | Mod: CPTII,,, | Performed by: INTERNAL MEDICINE

## 2023-06-08 PROCEDURE — 1159F PR MEDICATION LIST DOCUMENTED IN MEDICAL RECORD: ICD-10-PCS | Mod: CPTII,,, | Performed by: INTERNAL MEDICINE

## 2023-06-08 PROCEDURE — 99213 PR OFFICE/OUTPT VISIT, EST, LEVL III, 20-29 MIN: ICD-10-PCS | Mod: ,,, | Performed by: INTERNAL MEDICINE

## 2023-06-08 PROCEDURE — 99213 OFFICE O/P EST LOW 20 MIN: CPT | Mod: ,,, | Performed by: INTERNAL MEDICINE

## 2023-06-08 PROCEDURE — 3008F BODY MASS INDEX DOCD: CPT | Mod: CPTII,,, | Performed by: INTERNAL MEDICINE

## 2023-06-08 NOTE — PROGRESS NOTES
Care gaps discussed with patient. Patient refused tetanus vaccine. Patient has a mammogram scheduled. Patient refused A1C screening. Patient refused shingles vaccine.

## 2023-06-12 NOTE — PROGRESS NOTES
Subjective:       Patient ID: Faviola King is a 62 y.o. female.    Chief Complaint: Procedure (Surgery clearance )    HPI  .  Patient seen and evaluated she has a history of a lung mass she wanted a 2nd opinion therefore she went to [facility] however it has been slow to get the lung mass removed.  Patient asks for any other recommendations.  I informed the patient that we need to go ahead and send her to surgery to see Dr. Michael Denson for the lung mass also going to refer the patient to Hematology-Oncolog.  Patient does have a history of tobacco use however she quit smoking but she does have a lung mass that is cancerous and she is going to need the mass removed .  Current Medications:    Current Outpatient Medications:     ALPRAZolam (XANAX) 0.25 MG tablet, Take 1 tablet (0.25 mg total) by mouth nightly as needed for Anxiety., Disp: 30 tablet, Rfl: 1    amLODIPine (NORVASC) 10 MG tablet, Take 1 tablet (10 mg total) by mouth once daily., Disp: 90 tablet, Rfl: 1    carvediloL (COREG) 6.25 MG tablet, Take 1 tablet (6.25 mg total) by mouth 2 (two) times daily., Disp: 180 tablet, Rfl: 0    cyproheptadine (PERIACTIN) 4 mg tablet, Take 0.5 tablets (2 mg total) by mouth 3 (three) times daily., Disp: 90 tablet, Rfl: 2    pantoprazole (PROTONIX) 40 MG tablet, Take 1 tablet (40 mg total) by mouth once daily., Disp: 90 tablet, Rfl: 1    potassium chloride SA (K-DUR,KLOR-CON) 20 MEQ tablet, Take 1 tablet (20 mEq total) by mouth 3 (three) times daily., Disp: 180 tablet, Rfl: 1    nicotine (NICODERM CQ) 14 mg/24 hr, Place 1 patch onto the skin once daily. (Patient not taking: Reported on 2/27/2023), Disp: 30 patch, Rfl: 1    ondansetron (ZOFRAN-ODT) 4 MG TbDL, Take 2 tablets (8 mg total) by mouth 2 (two) times daily. (Patient not taking: Reported on 1/26/2023), Disp: 20 tablet, Rfl: 0           Review of Systems   Constitutional:  Negative for appetite change, fatigue and fever.   Respiratory:  Positive for shortness of breath.   "  Cardiovascular:  Negative for chest pain.   Gastrointestinal:  Negative for abdominal pain and constipation.   Endocrine: Negative for polydipsia, polyphagia and polyuria.   Genitourinary:  Negative for difficulty urinating, frequency and hot flashes.   Allergic/Immunologic: Negative for environmental allergies.   Neurological:  Negative for dizziness and light-headedness.   Psychiatric/Behavioral:  Negative for agitation.               Vitals:    06/08/23 1417 06/08/23 1418   BP: (!) 145/92 (!) 121/92   BP Location: Right arm Left arm   Patient Position: Sitting    BP Method: Large (Automatic)    Pulse: 85    Resp: 18    Temp: 97.6 °F (36.4 °C)    TempSrc: Temporal    SpO2: 99%    Weight: 66.6 kg (146 lb 12.8 oz)    Height: 4' 11" (1.499 m)         Physical Exam  Vitals and nursing note reviewed.   Constitutional:       Appearance: Normal appearance.   Cardiovascular:      Rate and Rhythm: Normal rate and regular rhythm.      Pulses: Normal pulses.      Heart sounds: Normal heart sounds.   Pulmonary:      Effort: Pulmonary effort is normal.      Breath sounds: Normal breath sounds.   Abdominal:      General: Abdomen is flat. Bowel sounds are normal.      Palpations: Abdomen is soft.   Musculoskeletal:         General: Normal range of motion.   Skin:     General: Skin is warm and dry.   Neurological:      General: No focal deficit present.      Mental Status: She is alert and oriented to person, place, and time. Mental status is at baseline.         Last Labs:     Office Visit on 05/25/2023   Component Date Value    [place] Interval 05/25/2023 128/172     QRS Duration 05/25/2023 108     QT/QTc 05/25/2023 436/474     PRT Axes 05/25/2023 10/-54/34     Heart Rate 05/25/2023 71     Results 05/25/2023      Sodium 05/25/2023 142     Potassium 05/25/2023 2.7 (L)     Chloride 05/25/2023 107     CO2 05/25/2023 28     Anion Gap 05/25/2023 10     Glucose 05/25/2023 83     BUN 05/25/2023 12     Creatinine 05/25/2023 0.75     " BUN/Creatinine Ratio 05/25/2023 16     Calcium 05/25/2023 9.8     eGFR 05/25/2023 89     Color, UA 05/25/2023 Light-Yellow     Clarity, UA 05/25/2023 Clear     pH, UA 05/25/2023 7.5     Leukocytes, UA 05/25/2023 Large (A)     Nitrites, UA 05/25/2023 Negative     Protein, UA 05/25/2023 Negative     Glucose, UA 05/25/2023 Normal     Ketones, UA 05/25/2023 Negative     Urobilinogen, UA 05/25/2023 Normal     Bilirubin, UA 05/25/2023 Negative     Blood, UA 05/25/2023 Trace (A)     Specific [place], UA 05/25/2023 1.015     Hepatitis C Ab 05/25/2023 Non-Reactive     HIV 1/2 05/25/2023 Non-Reactive     WBC 05/25/2023 8.16     RBC 05/25/2023 4.72     Hemoglobin 05/25/2023 13.7     Hematocrit 05/25/2023 44.8     MCV 05/25/2023 94.9     MCH 05/25/2023 29.0     MCHC 05/25/2023 30.6 (L)     RDW 05/25/2023 14.1     Platelet Count 05/25/2023 270     MPV 05/25/2023 10.0     Neutrophils % 05/25/2023 55.4     Lymphocytes % 05/25/2023 32.4     Monocytes % 05/25/2023 10.5 (H)     Eosinophils % 05/25/2023 1.0     Basophils % 05/25/2023 0.5     Immature Granulocytes % 05/25/2023 0.2     nRBC, Auto 05/25/2023 0.0     Neutrophils, Abs 05/25/2023 4.52     Lymphocytes, Absolute 05/25/2023 2.64     Monocytes, Absolute 05/25/2023 0.86 (H)     Eosinophils, Absolute 05/25/2023 0.08     Basophils, Absolute 05/25/2023 0.04     Immature Granulocytes, A* 05/25/2023 0.02     nRBC, Absolute 05/25/2023 0.00     Diff Type 05/25/2023 Auto     WBC, UA 05/25/2023 15 (H)     RBC, UA 05/25/2023 6 (H)     Bacteria, UA 05/25/2023 Occasional (A)     Squamous Epithelial Cell* 05/25/2023 Occasional (A)     Mucous 05/25/2023 Occasional (A)     Culture, Urine 05/25/2023 Skin/Urogenital [place] Isolated, no further workup.        Last Imaging:  X-Ray Chest PA And Lateral  Narrative: EXAMINATION:  XR CHEST PA AND LATERAL    CLINICAL HISTORY:  Encounter for other preprocedural examination    COMPARISON:  Chest x-ray February 2, 2023    TECHNIQUE:  Frontal and lateral  views of the chest.    FINDINGS:  The cardiomediastinal silhouette is stable in configuration.  Irregular nodular opacity within the right suprahilar lung with current measurement of approximately 2.3 cm.  Chronic change of the lungs without focal consolidation, pleural effusion, or pneumothorax.  Visualized osseous and surrounding soft tissue structures appear grossly unchanged.  Impression: Irregular nodular opacity within the right suprahilar lung with current measurement of approximately 2.3 cm.  Consider repeat chest CT.    Point of Service: [facility]    Electronically signed by: Dong Riggs  Date:    05/25/2023  Time:    16:04         **Labs and x-rays personally reviewed by me    ** reviewed      Objective:        Assessment:       1. Lung mass  Ambulatory referral/consult to General Surgery    Ambulatory referral/consult to Oncology           Plan:         [unfilled]

## 2023-07-18 ENCOUNTER — OFFICE VISIT (OUTPATIENT)
Dept: CARDIOLOGY | Facility: CLINIC | Age: 63
End: 2023-07-18
Payer: COMMERCIAL

## 2023-07-18 VITALS
HEIGHT: 59 IN | HEART RATE: 83 BPM | OXYGEN SATURATION: 99 % | WEIGHT: 150 LBS | BODY MASS INDEX: 30.24 KG/M2 | SYSTOLIC BLOOD PRESSURE: 114 MMHG | DIASTOLIC BLOOD PRESSURE: 80 MMHG

## 2023-07-18 DIAGNOSIS — R94.31 ABNORMAL EKG: Primary | ICD-10-CM

## 2023-07-18 DIAGNOSIS — I50.42 CHRONIC COMBINED SYSTOLIC AND DIASTOLIC HEART FAILURE: ICD-10-CM

## 2023-07-18 PROCEDURE — 3008F PR BODY MASS INDEX (BMI) DOCUMENTED: ICD-10-PCS | Mod: CPTII,,, | Performed by: STUDENT IN AN ORGANIZED HEALTH CARE EDUCATION/TRAINING PROGRAM

## 2023-07-18 PROCEDURE — 93010 EKG 12-LEAD: ICD-10-PCS | Mod: S$PBB,,, | Performed by: STUDENT IN AN ORGANIZED HEALTH CARE EDUCATION/TRAINING PROGRAM

## 2023-07-18 PROCEDURE — 3079F DIAST BP 80-89 MM HG: CPT | Mod: CPTII,,, | Performed by: STUDENT IN AN ORGANIZED HEALTH CARE EDUCATION/TRAINING PROGRAM

## 2023-07-18 PROCEDURE — 99204 PR OFFICE/OUTPT VISIT, NEW, LEVL IV, 45-59 MIN: ICD-10-PCS | Mod: S$PBB,,, | Performed by: STUDENT IN AN ORGANIZED HEALTH CARE EDUCATION/TRAINING PROGRAM

## 2023-07-18 PROCEDURE — 3008F BODY MASS INDEX DOCD: CPT | Mod: CPTII,,, | Performed by: STUDENT IN AN ORGANIZED HEALTH CARE EDUCATION/TRAINING PROGRAM

## 2023-07-18 PROCEDURE — 93010 ELECTROCARDIOGRAM REPORT: CPT | Mod: S$PBB,,, | Performed by: STUDENT IN AN ORGANIZED HEALTH CARE EDUCATION/TRAINING PROGRAM

## 2023-07-18 PROCEDURE — 1159F MED LIST DOCD IN RCRD: CPT | Mod: CPTII,,, | Performed by: STUDENT IN AN ORGANIZED HEALTH CARE EDUCATION/TRAINING PROGRAM

## 2023-07-18 PROCEDURE — 3079F PR MOST RECENT DIASTOLIC BLOOD PRESSURE 80-89 MM HG: ICD-10-PCS | Mod: CPTII,,, | Performed by: STUDENT IN AN ORGANIZED HEALTH CARE EDUCATION/TRAINING PROGRAM

## 2023-07-18 PROCEDURE — 3074F SYST BP LT 130 MM HG: CPT | Mod: CPTII,,, | Performed by: STUDENT IN AN ORGANIZED HEALTH CARE EDUCATION/TRAINING PROGRAM

## 2023-07-18 PROCEDURE — 99215 OFFICE O/P EST HI 40 MIN: CPT | Mod: PBBFAC | Performed by: STUDENT IN AN ORGANIZED HEALTH CARE EDUCATION/TRAINING PROGRAM

## 2023-07-18 PROCEDURE — 99204 OFFICE O/P NEW MOD 45 MIN: CPT | Mod: S$PBB,,, | Performed by: STUDENT IN AN ORGANIZED HEALTH CARE EDUCATION/TRAINING PROGRAM

## 2023-07-18 PROCEDURE — 93005 ELECTROCARDIOGRAM TRACING: CPT | Mod: PBBFAC | Performed by: STUDENT IN AN ORGANIZED HEALTH CARE EDUCATION/TRAINING PROGRAM

## 2023-07-18 PROCEDURE — 1159F PR MEDICATION LIST DOCUMENTED IN MEDICAL RECORD: ICD-10-PCS | Mod: CPTII,,, | Performed by: STUDENT IN AN ORGANIZED HEALTH CARE EDUCATION/TRAINING PROGRAM

## 2023-07-18 PROCEDURE — 3074F PR MOST RECENT SYSTOLIC BLOOD PRESSURE < 130 MM HG: ICD-10-PCS | Mod: CPTII,,, | Performed by: STUDENT IN AN ORGANIZED HEALTH CARE EDUCATION/TRAINING PROGRAM

## 2023-07-18 NOTE — PROGRESS NOTES
PCP: Shantanu Larson MD    Referring Provider:     Subjective:   Faviola King is a 62 y.o. female who presents for new patient visit for evaluation of abnormal EKG.    Last office note from previous EMR reviewed.  Last seen by Dr. Lloyd on 6/12/2019.  She has a history of systolic heart failure LVEF 35% secondary to stress-induced cardiomyopathy.  Left heart catheterization in September 2018 showed nonobstructive CAD.  It appears that the patient did not follow up with Cardiology after.     She is doing well.  Denies chest pain, shortness of breath, orthopnea, leg swelling, palpitations, lightheadedness or syncopal episodes.  She is active at baseline.  She works at a .  She is easily able to achieve 4 Mets on a daily basis without any anginal or anginal equivalent symptoms.  Of note, she was recently diagnosed with lung cancer and assess scheduled to undergo lung surgery at [facility] this coming Friday    Fhx:  Mother (heart failure, hypertension), father (hypertension)  Shx:  40 pack-year smoking history, has recently cut back.    EKG 7/18/23: Normal sinus rhythm, anterior infarct (age undetermined)  ECHO No results found for this or any previous visit.     CATH: No results found for this or any previous visit.     Lab Results   Component Value Date     05/25/2023    K 2.7 (L) 05/25/2023     05/25/2023    CO2 28 05/25/2023    BUN 12 05/25/2023    CREATININE 0.75 05/25/2023    CALCIUM 9.8 05/25/2023    ANIONGAP 10 05/25/2023    EGFRNONAA 72 07/03/2022       Lab Results   Component Value Date    CHOL 198 05/24/2022     Lab Results   Component Value Date    HDL 41 05/24/2022     Lab Results   Component Value Date    LDLCALC 109 05/24/2022     Lab Results   Component Value Date    TRIG 240 (H) 05/24/2022     Lab Results   Component Value Date    CHOLHDL 4.8 05/24/2022       Lab Results   Component Value Date    WBC 8.16 05/25/2023    HGB 13.7 05/25/2023    HCT 44.8 05/25/2023    MCV 94.9  "05/25/2023     05/25/2023           Current Outpatient Medications:     amLODIPine (NORVASC) 10 MG tablet, Take 1 tablet (10 mg total) by mouth once daily., Disp: 90 tablet, Rfl: 1    carvediloL (COREG) 6.25 MG tablet, Take 1 tablet (6.25 mg total) by mouth 2 (two) times daily., Disp: 180 tablet, Rfl: 0    pantoprazole (PROTONIX) 40 MG tablet, Take 1 tablet (40 mg total) by mouth once daily., Disp: 90 tablet, Rfl: 1    potassium chloride SA (K-DUR,KLOR-CON) 20 MEQ tablet, Take 1 tablet (20 mEq total) by mouth 3 (three) times daily., Disp: 180 tablet, Rfl: 1    ALPRAZolam (XANAX) 0.25 MG tablet, Take 1 tablet (0.25 mg total) by mouth nightly as needed for Anxiety. (Patient not taking: Reported on 7/18/2023), Disp: 30 tablet, Rfl: 1    cyproheptadine (PERIACTIN) 4 mg tablet, Take 0.5 tablets (2 mg total) by mouth 3 (three) times daily., Disp: 90 tablet, Rfl: 2    nicotine (NICODERM CQ) 14 mg/24 hr, Place 1 patch onto the skin once daily. (Patient not taking: Reported on 2/27/2023), Disp: 30 patch, Rfl: 1    ondansetron (ZOFRAN-ODT) 4 MG TbDL, Take 2 tablets (8 mg total) by mouth 2 (two) times daily. (Patient not taking: Reported on 1/26/2023), Disp: 20 tablet, Rfl: 0    Review of Systems   Constitutional:  Negative for chills, diaphoresis, fever and malaise/fatigue.   Respiratory:  Negative for cough and shortness of breath.    Cardiovascular:  Negative for chest pain, palpitations, orthopnea, claudication, leg swelling and PND.   Gastrointestinal:  Negative for abdominal pain, heartburn, nausea and vomiting.   Neurological:  Negative for dizziness.     Objective:   /80 (BP Location: Right arm, Patient Position: Sitting)   Pulse 83   Ht 4' 11" (1.499 m)   Wt 68 kg (150 lb)   SpO2 99%   BMI 30.30 kg/m²     Physical Exam  Constitutional:       General: She is not in acute distress.     Appearance: Normal appearance.   Cardiovascular:      Rate and Rhythm: Normal rate and regular rhythm.      Pulses: " Normal pulses.      Heart sounds: Normal heart sounds. No murmur heard.    No friction rub. No gallop.   Pulmonary:      Effort: Pulmonary effort is normal.      Breath sounds: Normal breath sounds. No wheezing or rales.   Musculoskeletal:      Right lower leg: No edema.      Left lower leg: No edema.   Skin:     General: Skin is warm and dry.   Neurological:      Mental Status: She is alert.         Assessment:     1. Abnormal EKG  EKG 12-lead    Ambulatory referral/consult to Cardiology    EKG 12-lead            Plan:   No problem-specific Assessment & Plan notes found for this encounter.      Hx of systolic heart failure   LVEF 35% in 2018 due to stress-induced cardiomyopathy  NYHA class symptoms currently, euvolemic on exam  No repeat assessment of LV function noted on file   She did not subsequently f/u with cardiology since 2019  Will schedule Echocardiogram  Continue Coreg 6.25 mg b.i.d.    Perioperative cardiovascular risk assessment   Able to achieve 4 Mets without any anginal or anginal equivalent symptoms   Okay to undergo surgery from cardiac standpoint  She is scheduled to undergo surgery this Friday 7/07/21/2023. Echo should not delay surgery as pt is compensated    F/U in 6 months

## 2023-07-18 NOTE — PATIENT INSTRUCTIONS
Ok to undergo surgery from cardiac standpoint   Follow-up in 6 months   Echo is schedule for 8/01/2023 at 3:00pm

## 2023-07-19 ENCOUNTER — HOSPITAL ENCOUNTER (OUTPATIENT)
Dept: RADIOLOGY | Facility: HOSPITAL | Age: 63
Discharge: HOME | End: 2023-07-19
Attending: THORACIC SURGERY (CARDIOTHORACIC VASCULAR SURGERY)
Payer: COMMERCIAL

## 2023-07-19 DIAGNOSIS — R91.1 LUNG NODULE: ICD-10-CM

## 2023-07-19 PROCEDURE — 71250 CT THORAX DX C-: CPT | Mod: 26,,, | Performed by: RADIOLOGY

## 2023-07-19 PROCEDURE — 71250 CT CHEST WITHOUT CONTRAST: ICD-10-PCS | Mod: 26,,, | Performed by: RADIOLOGY

## 2023-07-19 PROCEDURE — 71250 CT THORAX DX C-: CPT | Mod: TC

## 2023-07-20 ENCOUNTER — OFFICE VISIT (OUTPATIENT)
Dept: FAMILY MEDICINE | Facility: CLINIC | Age: 63
End: 2023-07-20
Payer: COMMERCIAL

## 2023-07-20 VITALS
RESPIRATION RATE: 18 BRPM | HEART RATE: 89 BPM | TEMPERATURE: 98 F | BODY MASS INDEX: 30 KG/M2 | HEIGHT: 59 IN | SYSTOLIC BLOOD PRESSURE: 110 MMHG | WEIGHT: 148.81 LBS | DIASTOLIC BLOOD PRESSURE: 70 MMHG | OXYGEN SATURATION: 96 %

## 2023-07-20 DIAGNOSIS — K21.9 GASTROESOPHAGEAL REFLUX DISEASE, UNSPECIFIED WHETHER ESOPHAGITIS PRESENT: ICD-10-CM

## 2023-07-20 DIAGNOSIS — I10 ESSENTIAL (PRIMARY) HYPERTENSION: ICD-10-CM

## 2023-07-20 DIAGNOSIS — E66.9 OBESITY, UNSPECIFIED CLASSIFICATION, UNSPECIFIED OBESITY TYPE, UNSPECIFIED WHETHER SERIOUS COMORBIDITY PRESENT: ICD-10-CM

## 2023-07-20 DIAGNOSIS — R10.9 ABDOMINAL PAIN, UNSPECIFIED ABDOMINAL LOCATION: Primary | ICD-10-CM

## 2023-07-20 PROCEDURE — 3008F PR BODY MASS INDEX (BMI) DOCUMENTED: ICD-10-PCS | Mod: CPTII,,, | Performed by: INTERNAL MEDICINE

## 2023-07-20 PROCEDURE — 3078F PR MOST RECENT DIASTOLIC BLOOD PRESSURE < 80 MM HG: ICD-10-PCS | Mod: CPTII,,, | Performed by: INTERNAL MEDICINE

## 2023-07-20 PROCEDURE — 3074F SYST BP LT 130 MM HG: CPT | Mod: CPTII,,, | Performed by: INTERNAL MEDICINE

## 2023-07-20 PROCEDURE — 1159F MED LIST DOCD IN RCRD: CPT | Mod: CPTII,,, | Performed by: INTERNAL MEDICINE

## 2023-07-20 PROCEDURE — 1159F PR MEDICATION LIST DOCUMENTED IN MEDICAL RECORD: ICD-10-PCS | Mod: CPTII,,, | Performed by: INTERNAL MEDICINE

## 2023-07-20 PROCEDURE — 3008F BODY MASS INDEX DOCD: CPT | Mod: CPTII,,, | Performed by: INTERNAL MEDICINE

## 2023-07-20 PROCEDURE — 99214 OFFICE O/P EST MOD 30 MIN: CPT | Mod: ,,, | Performed by: INTERNAL MEDICINE

## 2023-07-20 PROCEDURE — 3074F PR MOST RECENT SYSTOLIC BLOOD PRESSURE < 130 MM HG: ICD-10-PCS | Mod: CPTII,,, | Performed by: INTERNAL MEDICINE

## 2023-07-20 PROCEDURE — 3078F DIAST BP <80 MM HG: CPT | Mod: CPTII,,, | Performed by: INTERNAL MEDICINE

## 2023-07-20 PROCEDURE — 1160F RVW MEDS BY RX/DR IN RCRD: CPT | Mod: CPTII,,, | Performed by: INTERNAL MEDICINE

## 2023-07-20 PROCEDURE — 99214 PR OFFICE/OUTPT VISIT, EST, LEVL IV, 30-39 MIN: ICD-10-PCS | Mod: ,,, | Performed by: INTERNAL MEDICINE

## 2023-07-20 PROCEDURE — 1160F PR REVIEW ALL MEDS BY PRESCRIBER/CLIN PHARMACIST DOCUMENTED: ICD-10-PCS | Mod: CPTII,,, | Performed by: INTERNAL MEDICINE

## 2023-07-27 PROBLEM — I10 ESSENTIAL (PRIMARY) HYPERTENSION: Status: ACTIVE | Noted: 2023-07-27

## 2023-07-27 PROBLEM — R10.9 ABDOMINAL PAIN: Status: ACTIVE | Noted: 2023-07-27

## 2023-07-27 PROBLEM — E66.9 OBESITY: Status: ACTIVE | Noted: 2023-07-27

## 2023-07-27 NOTE — PROGRESS NOTES
Subjective:       Patient ID: Faviola King is a 62 y.o. female.    Chief Complaint: Follow-up (6 wk follow up lung mass) and Back Pain (Pt states having low back pain )  Patient presents with moderate abdominal pain/abdominal shortness patient has chronic hypertension and chronic GERD.  Number plan today is to order a KUB.  She does have anxiety am going to refill his Xanax.  And done she has been nauseated will write for Zofran 8 mg b.i.d. p.r.n. nausea.  Follow-up  Associated symptoms include abdominal pain. Pertinent negatives include no chest pain, fatigue or fever.   Back Pain  Associated symptoms include abdominal pain. Pertinent negatives include no chest pain or fever.   .    Current Medications:    Current Outpatient Medications:     amLODIPine (NORVASC) 10 MG tablet, Take 1 tablet (10 mg total) by mouth once daily., Disp: 90 tablet, Rfl: 1    carvediloL (COREG) 6.25 MG tablet, Take 1 tablet (6.25 mg total) by mouth 2 (two) times daily., Disp: 180 tablet, Rfl: 0    cyproheptadine (PERIACTIN) 4 mg tablet, Take 0.5 tablets (2 mg total) by mouth 3 (three) times daily., Disp: 90 tablet, Rfl: 2    pantoprazole (PROTONIX) 40 MG tablet, Take 1 tablet (40 mg total) by mouth once daily., Disp: 90 tablet, Rfl: 1    potassium chloride SA (K-DUR,KLOR-CON) 20 MEQ tablet, Take 1 tablet (20 mEq total) by mouth 3 (three) times daily., Disp: 180 tablet, Rfl: 1    ALPRAZolam (XANAX) 0.25 MG tablet, Take 1 tablet (0.25 mg total) by mouth nightly as needed for Anxiety. (Patient not taking: Reported on 7/18/2023), Disp: 30 tablet, Rfl: 1    nicotine (NICODERM CQ) 14 mg/24 hr, Place 1 patch onto the skin once daily. (Patient not taking: Reported on 2/27/2023), Disp: 30 patch, Rfl: 1    ondansetron (ZOFRAN-ODT) 4 MG TbDL, Take 2 tablets (8 mg total) by mouth 2 (two) times daily. (Patient not taking: Reported on 1/26/2023), Disp: 20 tablet, Rfl: 0           Review of Systems   Constitutional:  Negative for appetite change, fatigue  "and fever.   Respiratory:  Negative for shortness of breath.    Cardiovascular:  Negative for chest pain.   Gastrointestinal:  Positive for abdominal pain. Negative for constipation.   Endocrine: Negative for polydipsia, polyphagia and polyuria.   Genitourinary:  Negative for difficulty urinating, frequency and hot flashes.   Musculoskeletal:  Positive for back pain.   Allergic/Immunologic: Negative for environmental allergies.   Neurological:  Negative for dizziness and light-headedness.   Psychiatric/Behavioral:  Negative for agitation.               Vitals:    07/20/23 1349   BP: 110/70   BP Location: Left arm   Patient Position: Sitting   BP Method: Medium (Manual)   Pulse: 89   Resp: 18   Temp: 97.6 °F (36.4 °C)   TempSrc: Tympanic   SpO2: 96%   Weight: 67.5 kg (148 lb 12.8 oz)   Height: 4' 11" (1.499 m)        Physical Exam  Vitals and nursing note reviewed.   Constitutional:       Appearance: Normal appearance.   Cardiovascular:      Rate and Rhythm: Normal rate and regular rhythm.      Pulses: Normal pulses.      Heart sounds: Normal heart sounds.   Pulmonary:      Effort: Pulmonary effort is normal.      Breath sounds: Normal breath sounds.   Abdominal:      General: Abdomen is flat. Bowel sounds are normal.      Palpations: Abdomen is soft.      Tenderness: There is abdominal tenderness.   Musculoskeletal:         General: Normal range of motion.   Skin:     General: Skin is warm and dry.   Neurological:      General: No focal deficit present.      Mental Status: She is alert and oriented to person, place, and time. Mental status is at baseline.         Last Labs:     No visits with results within 1 Month(s) from this visit.   Latest known visit with results is:   Office Visit on 05/25/2023   Component Date Value    MI Interval 05/25/2023 128/172     QRS Duration 05/25/2023 108     QT/QTc 05/25/2023 436/474     PRT Axes 05/25/2023 10/-54/34     Heart Rate 05/25/2023 71     Results 05/25/2023      Sodium " 05/25/2023 142     Potassium 05/25/2023 2.7 (L)     Chloride 05/25/2023 107     CO2 05/25/2023 28     Anion Gap 05/25/2023 10     Glucose 05/25/2023 83     BUN 05/25/2023 12     Creatinine 05/25/2023 0.75     BUN/Creatinine Ratio 05/25/2023 16     Calcium 05/25/2023 9.8     eGFR 05/25/2023 89     Color, UA 05/25/2023 Light-Yellow     Clarity, UA 05/25/2023 Clear     pH, UA 05/25/2023 7.5     Leukocytes, UA 05/25/2023 Large (A)     Nitrites, UA 05/25/2023 Negative     Protein, UA 05/25/2023 Negative     Glucose, UA 05/25/2023 Normal     Ketones, UA 05/25/2023 Negative     Urobilinogen, UA 05/25/2023 Normal     Bilirubin, UA 05/25/2023 Negative     Blood, UA 05/25/2023 Trace (A)     Specific [place], UA 05/25/2023 1.015     Hepatitis C Ab 05/25/2023 Non-Reactive     HIV 1/2 05/25/2023 Non-Reactive     WBC 05/25/2023 8.16     RBC 05/25/2023 4.72     Hemoglobin 05/25/2023 13.7     Hematocrit 05/25/2023 44.8     MCV 05/25/2023 94.9     MCH 05/25/2023 29.0     MCHC 05/25/2023 30.6 (L)     RDW 05/25/2023 14.1     Platelet Count 05/25/2023 270     MPV 05/25/2023 10.0     Neutrophils % 05/25/2023 55.4     Lymphocytes % 05/25/2023 32.4     Monocytes % 05/25/2023 10.5 (H)     Eosinophils % 05/25/2023 1.0     Basophils % 05/25/2023 0.5     Immature Granulocytes % 05/25/2023 0.2     nRBC, Auto 05/25/2023 0.0     Neutrophils, Abs 05/25/2023 4.52     Lymphocytes, Absolute 05/25/2023 2.64     Monocytes, Absolute 05/25/2023 0.86 (H)     Eosinophils, Absolute 05/25/2023 0.08     Basophils, Absolute 05/25/2023 0.04     Immature Granulocytes, A* 05/25/2023 0.02     nRBC, Absolute 05/25/2023 0.00     Diff Type 05/25/2023 Auto     WBC, UA 05/25/2023 15 (H)     RBC, UA 05/25/2023 6 (H)     Bacteria, UA 05/25/2023 Occasional (A)     Squamous Epithelial Cell* 05/25/2023 Occasional (A)     Mucous 05/25/2023 Occasional (A)     Culture, Urine 05/25/2023 Skin/Urogenital [place] Isolated, no further workup.        Last Imaging:  X-Ray  KUB  Narrative: EXAMINATION:  XR KUB    CLINICAL HISTORY:  Unspecified abdominal pain    COMPARISON:  Acute abdominal series July 3, 2022    TECHNIQUE:  Frontal views of the abdomen.    FINDINGS:  Nonspecific bowel gas pattern.  Visualized osseous and surrounding soft tissue structures demonstrate no acute abnormality.  Lung bases clear.  Impression: No acute findings.    Point of Service: [facility]    Electronically signed by: Dong Riggs  Date:    07/20/2023  Time:    14:56         **Labs and x-rays personally reviewed by me    ** reviewed      Objective:        Assessment:       1. Abdominal pain, unspecified abdominal location  X-Ray KUB      2. Obesity, unspecified classification, unspecified obesity type, unspecified whether serious comorbidity present        3. Essential (primary) hypertension        4. Gastroesophageal reflux disease, unspecified whether esophagitis present             Plan:         [unfilled]

## 2023-08-14 RX ORDER — CARVEDILOL 6.25 MG/1
6.25 TABLET ORAL 2 TIMES DAILY
Qty: 180 TABLET | Refills: 0 | Status: SHIPPED | OUTPATIENT
Start: 2023-08-14

## 2023-08-27 ENCOUNTER — HOSPITAL ENCOUNTER (EMERGENCY)
Facility: HOSPITAL | Age: 63
Discharge: HOME | End: 2023-08-27
Payer: COMMERCIAL

## 2023-08-27 VITALS
DIASTOLIC BLOOD PRESSURE: 79 MMHG | BODY MASS INDEX: 30.84 KG/M2 | TEMPERATURE: 100 F | RESPIRATION RATE: 18 BRPM | SYSTOLIC BLOOD PRESSURE: 143 MMHG | WEIGHT: 153 LBS | HEIGHT: 59 IN | OXYGEN SATURATION: 97 % | HEART RATE: 103 BPM

## 2023-08-27 DIAGNOSIS — U07.1 COVID: Primary | ICD-10-CM

## 2023-08-27 DIAGNOSIS — N39.0 ACUTE URINARY TRACT INFECTION: ICD-10-CM

## 2023-08-27 DIAGNOSIS — U07.1 COVID-19 VIRUS DETECTED: ICD-10-CM

## 2023-08-27 LAB
ALBUMIN SERPL BCP-MCNC: 3.7 G/DL (ref 3.5–5)
ALBUMIN/GLOB SERPL: 0.7 {RATIO}
ALP SERPL-CCNC: 80 U/L (ref 55–142)
ALT SERPL W P-5'-P-CCNC: 24 U/L (ref 13–56)
ANION GAP SERPL CALCULATED.3IONS-SCNC: 13 MMOL/L (ref 7–16)
AST SERPL W P-5'-P-CCNC: 26 U/L (ref 15–37)
BACTERIA #/AREA URNS HPF: ABNORMAL /HPF
BASOPHILS # BLD AUTO: 0.02 K/UL (ref 0–0.2)
BASOPHILS NFR BLD AUTO: 0.3 % (ref 0–1)
BILIRUB SERPL-MCNC: 0.3 MG/DL (ref ?–1.2)
BILIRUB UR QL STRIP: NEGATIVE
BUN SERPL-MCNC: 19 MG/DL (ref 7–18)
BUN/CREAT SERPL: 17 (ref 6–20)
CALCIUM SERPL-MCNC: 9.5 MG/DL (ref 8.5–10.1)
CHLORIDE SERPL-SCNC: 102 MMOL/L (ref 98–107)
CLARITY UR: ABNORMAL
CO2 SERPL-SCNC: 24 MMOL/L (ref 21–32)
COLOR UR: YELLOW
CREAT SERPL-MCNC: 1.15 MG/DL (ref 0.55–1.02)
DIFFERENTIAL METHOD BLD: ABNORMAL
EGFR (NO RACE VARIABLE) (RUSH/TITUS): 53 ML/MIN/1.73M2
EOSINOPHIL # BLD AUTO: 0.1 K/UL (ref 0–0.5)
EOSINOPHIL NFR BLD AUTO: 1.7 % (ref 1–4)
ERYTHROCYTE [DISTWIDTH] IN BLOOD BY AUTOMATED COUNT: 14.6 % (ref 11.5–14.5)
FLUAV AG UPPER RESP QL IA.RAPID: NEGATIVE
FLUBV AG UPPER RESP QL IA.RAPID: NEGATIVE
GLOBULIN SER-MCNC: 5.3 G/DL (ref 2–4)
GLUCOSE SERPL-MCNC: 103 MG/DL (ref 74–106)
GLUCOSE UR STRIP-MCNC: NORMAL MG/DL
HCT VFR BLD AUTO: 43 % (ref 38–47)
HGB BLD-MCNC: 14.7 G/DL (ref 12–16)
HYALINE CASTS #/AREA URNS LPF: ABNORMAL /LPF
IMM GRANULOCYTES # BLD AUTO: 0.04 K/UL (ref 0–0.04)
IMM GRANULOCYTES NFR BLD: 0.7 % (ref 0–0.4)
KETONES UR STRIP-SCNC: NEGATIVE MG/DL
LEUKOCYTE ESTERASE UR QL STRIP: ABNORMAL
LYMPHOCYTES # BLD AUTO: 1.69 K/UL (ref 1–4.8)
LYMPHOCYTES NFR BLD AUTO: 28.3 % (ref 27–41)
MCH RBC QN AUTO: 28.9 PG (ref 27–31)
MCHC RBC AUTO-ENTMCNC: 34.2 G/DL (ref 32–36)
MCV RBC AUTO: 84.6 FL (ref 80–96)
MONOCYTES # BLD AUTO: 1.24 K/UL (ref 0–0.8)
MONOCYTES NFR BLD AUTO: 20.8 % (ref 2–6)
MPC BLD CALC-MCNC: 9 FL (ref 9.4–12.4)
MUCOUS, UA: ABNORMAL /LPF
NEUTROPHILS # BLD AUTO: 2.88 K/UL (ref 1.8–7.7)
NEUTROPHILS NFR BLD AUTO: 48.2 % (ref 53–65)
NITRITE UR QL STRIP: NEGATIVE
NRBC # BLD AUTO: 0 X10E3/UL
NRBC, AUTO (.00): 0 %
PH UR STRIP: 5 PH UNITS
PLATELET # BLD AUTO: 273 K/UL (ref 150–400)
POTASSIUM SERPL-SCNC: 3.2 MMOL/L (ref 3.5–5.1)
PROT SERPL-MCNC: 9 G/DL (ref 6.4–8.2)
PROT UR QL STRIP: 30
RBC # BLD AUTO: 5.08 M/UL (ref 4.2–5.4)
RBC # UR STRIP: ABNORMAL /UL
RBC #/AREA URNS HPF: 7 /HPF
SARS-COV-2 RDRP RESP QL NAA+PROBE: POSITIVE
SODIUM SERPL-SCNC: 136 MMOL/L (ref 136–145)
SP GR UR STRIP: 1.02
SQUAMOUS #/AREA URNS LPF: ABNORMAL /HPF
TRANS CELLS #/AREA URNS LPF: ABNORMAL /LPF
UROBILINOGEN UR STRIP-ACNC: NORMAL MG/DL
WBC # BLD AUTO: 5.97 K/UL (ref 4.5–11)
WBC #/AREA URNS HPF: 40 /HPF

## 2023-08-27 PROCEDURE — 87635 SARS-COV-2 COVID-19 AMP PRB: CPT | Performed by: NURSE PRACTITIONER

## 2023-08-27 PROCEDURE — 99284 EMERGENCY DEPT VISIT MOD MDM: CPT | Mod: ,,, | Performed by: NURSE PRACTITIONER

## 2023-08-27 PROCEDURE — 87186 SC STD MICRODIL/AGAR DIL: CPT | Performed by: NURSE PRACTITIONER

## 2023-08-27 PROCEDURE — 99284 PR EMERGENCY DEPT VISIT,LEVEL IV: ICD-10-PCS | Mod: ,,, | Performed by: NURSE PRACTITIONER

## 2023-08-27 PROCEDURE — 81001 URINALYSIS AUTO W/SCOPE: CPT | Performed by: NURSE PRACTITIONER

## 2023-08-27 PROCEDURE — 63600175 PHARM REV CODE 636 W HCPCS: Performed by: NURSE PRACTITIONER

## 2023-08-27 PROCEDURE — 96372 THER/PROPH/DIAG INJ SC/IM: CPT | Performed by: NURSE PRACTITIONER

## 2023-08-27 PROCEDURE — 99284 EMERGENCY DEPT VISIT MOD MDM: CPT

## 2023-08-27 PROCEDURE — 85025 COMPLETE CBC W/AUTO DIFF WBC: CPT | Performed by: NURSE PRACTITIONER

## 2023-08-27 PROCEDURE — 80053 COMPREHEN METABOLIC PANEL: CPT | Performed by: NURSE PRACTITIONER

## 2023-08-27 PROCEDURE — 87804 INFLUENZA ASSAY W/OPTIC: CPT | Performed by: NURSE PRACTITIONER

## 2023-08-27 PROCEDURE — 25000003 PHARM REV CODE 250: Performed by: NURSE PRACTITIONER

## 2023-08-27 PROCEDURE — 87077 CULTURE AEROBIC IDENTIFY: CPT | Performed by: NURSE PRACTITIONER

## 2023-08-27 RX ORDER — NITROFURANTOIN 25; 75 MG/1; MG/1
100 CAPSULE ORAL 2 TIMES DAILY
Qty: 20 CAPSULE | Refills: 0 | Status: SHIPPED | OUTPATIENT
Start: 2023-08-27 | End: 2023-09-06

## 2023-08-27 RX ADMIN — LIDOCAINE HYDROCHLORIDE 1 G: 10 INJECTION, SOLUTION INFILTRATION; PERINEURAL at 05:08

## 2023-08-27 NOTE — DISCHARGE INSTRUCTIONS
Quarantine x 5 days.    Monitor fever every four hours.   Treat fever if greater than 100.3 with alterations of tylenol and ibuprofen.   Encourage fluid intake to keep hydrated and flush urinary tract.  Robitussin as needed for cough.   Follow up with PCP if symptoms do not improve.  Return to ER with new or worsening symptoms.

## 2023-08-27 NOTE — ED TRIAGE NOTES
Pt presents to ED with c/o congestion and feeling bad since Friday. Pt also states she has not had a bowel movement since Friday.

## 2023-08-30 LAB — UA COMPLETE W REFLEX CULTURE PNL UR: ABNORMAL

## 2023-09-01 ENCOUNTER — HOSPITAL ENCOUNTER (EMERGENCY)
Facility: HOSPITAL | Age: 63
Discharge: HOME | End: 2023-09-01
Attending: FAMILY MEDICINE
Payer: COMMERCIAL

## 2023-09-01 VITALS
DIASTOLIC BLOOD PRESSURE: 68 MMHG | OXYGEN SATURATION: 95 % | WEIGHT: 153 LBS | RESPIRATION RATE: 21 BRPM | HEART RATE: 92 BPM | BODY MASS INDEX: 30.84 KG/M2 | SYSTOLIC BLOOD PRESSURE: 114 MMHG | TEMPERATURE: 99 F | HEIGHT: 59 IN

## 2023-09-01 DIAGNOSIS — R53.1 WEAKNESS: ICD-10-CM

## 2023-09-01 DIAGNOSIS — U07.1 COVID-19: Primary | ICD-10-CM

## 2023-09-01 LAB
ALBUMIN SERPL BCP-MCNC: 3.4 G/DL (ref 3.5–5)
ALBUMIN/GLOB SERPL: 0.7 {RATIO}
ALP SERPL-CCNC: 66 U/L (ref 55–142)
ALT SERPL W P-5'-P-CCNC: 21 U/L (ref 13–56)
ANION GAP SERPL CALCULATED.3IONS-SCNC: 13 MMOL/L (ref 7–16)
AST SERPL W P-5'-P-CCNC: 32 U/L (ref 15–37)
BASOPHILS # BLD AUTO: 0.02 K/UL (ref 0–0.2)
BASOPHILS NFR BLD AUTO: 0.4 % (ref 0–1)
BILIRUB SERPL-MCNC: 0.4 MG/DL (ref ?–1.2)
BUN SERPL-MCNC: 11 MG/DL (ref 7–18)
BUN/CREAT SERPL: 12 (ref 6–20)
CALCIUM SERPL-MCNC: 9.1 MG/DL (ref 8.5–10.1)
CHLORIDE SERPL-SCNC: 99 MMOL/L (ref 98–107)
CO2 SERPL-SCNC: 25 MMOL/L (ref 21–32)
CREAT SERPL-MCNC: 0.9 MG/DL (ref 0.55–1.02)
DIFFERENTIAL METHOD BLD: ABNORMAL
EGFR (NO RACE VARIABLE) (RUSH/TITUS): 71 ML/MIN/1.73M2
EOSINOPHIL # BLD AUTO: 0.11 K/UL (ref 0–0.5)
EOSINOPHIL NFR BLD AUTO: 1.9 % (ref 1–4)
ERYTHROCYTE [DISTWIDTH] IN BLOOD BY AUTOMATED COUNT: 14.4 % (ref 11.5–14.5)
GLOBULIN SER-MCNC: 4.8 G/DL (ref 2–4)
GLUCOSE SERPL-MCNC: 84 MG/DL (ref 74–106)
HCT VFR BLD AUTO: 39.6 % (ref 38–47)
HGB BLD-MCNC: 13.4 G/DL (ref 12–16)
IMM GRANULOCYTES # BLD AUTO: 0.02 K/UL (ref 0–0.04)
IMM GRANULOCYTES NFR BLD: 0.4 % (ref 0–0.4)
LYMPHOCYTES # BLD AUTO: 1.79 K/UL (ref 1–4.8)
LYMPHOCYTES NFR BLD AUTO: 31.6 % (ref 27–41)
MCH RBC QN AUTO: 28.8 PG (ref 27–31)
MCHC RBC AUTO-ENTMCNC: 33.8 G/DL (ref 32–36)
MCV RBC AUTO: 85 FL (ref 80–96)
MONOCYTES # BLD AUTO: 0.46 K/UL (ref 0–0.8)
MONOCYTES NFR BLD AUTO: 8.1 % (ref 2–6)
MPC BLD CALC-MCNC: 10.4 FL (ref 9.4–12.4)
NEUTROPHILS # BLD AUTO: 3.27 K/UL (ref 1.8–7.7)
NEUTROPHILS NFR BLD AUTO: 57.6 % (ref 53–65)
NRBC # BLD AUTO: 0 X10E3/UL
NRBC, AUTO (.00): 0 %
PLATELET # BLD AUTO: 236 K/UL (ref 150–400)
POTASSIUM SERPL-SCNC: 3.4 MMOL/L (ref 3.5–5.1)
PROT SERPL-MCNC: 8.2 G/DL (ref 6.4–8.2)
RBC # BLD AUTO: 4.66 M/UL (ref 4.2–5.4)
SODIUM SERPL-SCNC: 134 MMOL/L (ref 136–145)
WBC # BLD AUTO: 5.67 K/UL (ref 4.5–11)

## 2023-09-01 PROCEDURE — 63600175 PHARM REV CODE 636 W HCPCS: Performed by: FAMILY MEDICINE

## 2023-09-01 PROCEDURE — 96375 TX/PRO/DX INJ NEW DRUG ADDON: CPT

## 2023-09-01 PROCEDURE — 99284 PR EMERGENCY DEPT VISIT,LEVEL IV: ICD-10-PCS | Mod: ,,, | Performed by: FAMILY MEDICINE

## 2023-09-01 PROCEDURE — 93010 ELECTROCARDIOGRAM REPORT: CPT | Mod: ,,, | Performed by: INTERNAL MEDICINE

## 2023-09-01 PROCEDURE — 93005 ELECTROCARDIOGRAM TRACING: CPT

## 2023-09-01 PROCEDURE — 99284 EMERGENCY DEPT VISIT MOD MDM: CPT | Mod: ,,, | Performed by: FAMILY MEDICINE

## 2023-09-01 PROCEDURE — 25000003 PHARM REV CODE 250: Performed by: FAMILY MEDICINE

## 2023-09-01 PROCEDURE — 96374 THER/PROPH/DIAG INJ IV PUSH: CPT

## 2023-09-01 PROCEDURE — 93010 EKG 12-LEAD: ICD-10-PCS | Mod: ,,, | Performed by: INTERNAL MEDICINE

## 2023-09-01 PROCEDURE — 85025 COMPLETE CBC W/AUTO DIFF WBC: CPT | Performed by: FAMILY MEDICINE

## 2023-09-01 PROCEDURE — 96361 HYDRATE IV INFUSION ADD-ON: CPT

## 2023-09-01 PROCEDURE — 99284 EMERGENCY DEPT VISIT MOD MDM: CPT | Mod: 25

## 2023-09-01 PROCEDURE — 80053 COMPREHEN METABOLIC PANEL: CPT | Performed by: FAMILY MEDICINE

## 2023-09-01 RX ORDER — ONDANSETRON 2 MG/ML
4 INJECTION INTRAMUSCULAR; INTRAVENOUS ONCE
Status: COMPLETED | OUTPATIENT
Start: 2023-09-01 | End: 2023-09-01

## 2023-09-01 RX ORDER — METHYLPREDNISOLONE 4 MG/1
TABLET ORAL
Qty: 21 EACH | Refills: 0 | Status: SHIPPED | OUTPATIENT
Start: 2023-09-01 | End: 2023-09-22

## 2023-09-01 RX ORDER — TRAZODONE HYDROCHLORIDE 50 MG/1
25 TABLET ORAL NIGHTLY PRN
COMMUNITY
Start: 2023-08-18

## 2023-09-01 RX ORDER — OXYCODONE HYDROCHLORIDE 5 MG/1
5 TABLET ORAL EVERY 6 HOURS PRN
COMMUNITY
Start: 2023-08-03

## 2023-09-01 RX ORDER — KETOROLAC TROMETHAMINE 30 MG/ML
30 INJECTION, SOLUTION INTRAMUSCULAR; INTRAVENOUS
Status: COMPLETED | OUTPATIENT
Start: 2023-09-01 | End: 2023-09-01

## 2023-09-01 RX ORDER — AZITHROMYCIN 250 MG/1
TABLET, FILM COATED ORAL
Qty: 6 TABLET | Refills: 0 | Status: SHIPPED | OUTPATIENT
Start: 2023-09-01 | End: 2023-09-06

## 2023-09-01 RX ADMIN — KETOROLAC TROMETHAMINE 30 MG: 30 INJECTION, SOLUTION INTRAMUSCULAR at 09:09

## 2023-09-01 RX ADMIN — ONDANSETRON 4 MG: 2 INJECTION INTRAMUSCULAR; INTRAVENOUS at 09:09

## 2023-09-01 RX ADMIN — SODIUM CHLORIDE 1000 ML: 9 INJECTION, SOLUTION INTRAVENOUS at 09:09

## 2023-09-02 NOTE — ED PROVIDER NOTES
Encounter Date: 2023       History     Chief Complaint   Patient presents with    Weakness    URI     Pt c/o gen body ache - fever - chills - took tylenol this AM -      Patient in with COVID symptoms.  She was positive for COVID on 2023.  Has been very fatigued.  Dehydrated decreased p.o. intake.        Review of patient's allergies indicates:  No Known Allergies  Past Medical History:   Diagnosis Date    GERD (gastroesophageal reflux disease)     Heart attack 2017    Hypertension     Nodule of upper lobe of right lung     1.5 x 1.2 cm nodule RUL  23     Past Surgical History:   Procedure Laterality Date     SECTION      HYSTERECTOMY       Family History   Problem Relation Age of Onset    Heart failure Mother     Hypertension Mother     Hypertension Father     Diabetes Neg Hx     Cancer Neg Hx      Social History     Tobacco Use    Smoking status: Some Days     Current packs/day: 1.00     Average packs/day: 1 pack/day for 40.0 years (40.0 ttl pk-yrs)     Types: Cigarettes    Smokeless tobacco: Never    Tobacco comments:     1 PPD smoking history  for ~ 40 years: over the last year cut back to ~ 1/2 PPD: recently to 1 or 2 cigarettes/day   Substance Use Topics    Alcohol use: Never    Drug use: Yes     Types: Marijuana     Review of Systems   Constitutional: Negative.  Negative for fever.   HENT: Negative.  Negative for sore throat.         D decreased p.o. intake   Eyes: Negative.    Respiratory: Negative.  Negative for shortness of breath.    Cardiovascular: Negative.  Negative for chest pain.   Gastrointestinal: Negative.  Negative for nausea.   Endocrine: Negative.    Genitourinary: Negative.  Negative for dysuria.   Musculoskeletal: Negative.  Negative for back pain.   Skin: Negative.  Negative for rash.   Allergic/Immunologic: Negative.    Neurological: Negative.  Negative for weakness.   Hematological: Negative.  Does not bruise/bleed easily.   Psychiatric/Behavioral: Negative.     All  other systems reviewed and are negative.      Physical Exam     Initial Vitals [09/01/23 1955]   BP Pulse Resp Temp SpO2   121/77 (!) 111 20 99.4 °F (37.4 °C) 98 %      MAP       --         Physical Exam    Constitutional: She appears well-developed and well-nourished.   HENT:   Head: Normocephalic and atraumatic.   Right Ear: External ear normal.   Left Ear: External ear normal.   Nose: Nose normal.   Mouth/Throat: Oropharynx is clear and moist.   Eyes: Conjunctivae and EOM are normal. Pupils are equal, round, and reactive to light.   Neck: Neck supple.   Normal range of motion.  Cardiovascular:  Normal rate, regular rhythm, normal heart sounds and intact distal pulses.           Pulmonary/Chest: Breath sounds normal.   Abdominal: Abdomen is soft. Bowel sounds are normal.   Genitourinary:    Vagina and uterus normal.     Musculoskeletal:         General: Normal range of motion.      Cervical back: Normal range of motion and neck supple.     Neurological: She is alert and oriented to person, place, and time. She has normal strength and normal reflexes.   Skin: Skin is warm. Capillary refill takes less than 2 seconds.   Psychiatric: She has a normal mood and affect. Her behavior is normal. Judgment and thought content normal.         Medical Screening Exam   See Full Note    ED Course   Procedures  Labs Reviewed   COMPREHENSIVE METABOLIC PANEL - Abnormal; Notable for the following components:       Result Value    Sodium 134 (*)     Potassium 3.4 (*)     Albumin 3.4 (*)     Globulin 4.8 (*)     All other components within normal limits   CBC WITH DIFFERENTIAL - Abnormal; Notable for the following components:    Monocytes % 8.1 (*)     All other components within normal limits   CBC W/ AUTO DIFFERENTIAL    Narrative:     The following orders were created for panel order CBC Auto Differential.  Procedure                               Abnormality         Status                     ---------                                -----------         ------                     CBC with Differential[[id number]]        Abnormal            Final result                 Please view results for these tests on the individual orders.   EXTRA TUBES    Narrative:     The following orders were created for panel order EXTRA TUBES.  Procedure                               Abnormality         Status                     ---------                               -----------         ------                     Light Blue Top Hold[[id number]]                              In process                 Light Green Top Hold[[id number]]                             In process                 Lavender Top Hold[[id number]]                                In process                 Gold Top Hold[[id number]]                                    In process                   Please view results for these tests on the individual orders.   LIGHT BLUE TOP HOLD   LIGHT GREEN TOP HOLD   LAVENDER TOP HOLD   GOLD TOP HOLD          Imaging Results    None          Medications   sodium chloride 0.9% bolus 1,000 mL 1,000 mL (1,000 mLs Intravenous New Bag 9/1/23 2154)   ketorolac injection 30 mg (30 mg Intravenous Given 9/1/23 2154)   ondansetron injection 4 mg (4 mg Intravenous Given 9/1/23 2154)     Medical Decision Making  Amount and/or Complexity of Data Reviewed  Labs: ordered.    Risk  Prescription drug management.                          Medical Decision Making:   Initial Assessment:   COVID infection with decreased p.o. intake.  Differential Diagnosis:   Decreased p.o. intake.  COVID infection  ED Management:  Will treat with Medrol Dosepak  And will treat with Z-Dick with Zofran  Follow-up primary care      Clinical Impression:   Final diagnoses:  [U07.1] COVID-19 (Primary)        ED Disposition Condition    Discharge Stable          ED Prescriptions       Medication Sig Dispense Start Date End Date Auth. Provider    azithromycin (Z-DICK) 250 MG tablet Take 2 tablets by mouth on day 1;  Take 1 tablet by mouth on days 2-5 6 tablet 9/1/2023 9/6/2023 Vj Vu, DO    methylPREDNISolone (MEDROL DOSEPACK) 4 mg tablet use as directed 21 each 9/1/2023 9/22/2023 Vj Vu, DO          Follow-up Information    None          Vj Vu,   09/01/23 [id number]

## 2023-09-22 ENCOUNTER — OFFICE VISIT (OUTPATIENT)
Dept: GASTROENTEROLOGY | Facility: CLINIC | Age: 63
End: 2023-09-22
Payer: COMMERCIAL

## 2023-09-22 VITALS
BODY MASS INDEX: 30.8 KG/M2 | HEART RATE: 81 BPM | WEIGHT: 152.81 LBS | HEIGHT: 59 IN | DIASTOLIC BLOOD PRESSURE: 89 MMHG | SYSTOLIC BLOOD PRESSURE: 130 MMHG

## 2023-09-22 DIAGNOSIS — C34.90 MALIGNANT NEOPLASM OF LUNG, UNSPECIFIED LATERALITY, UNSPECIFIED PART OF LUNG: ICD-10-CM

## 2023-09-22 DIAGNOSIS — K21.9 GASTROESOPHAGEAL REFLUX DISEASE, UNSPECIFIED WHETHER ESOPHAGITIS PRESENT: Primary | ICD-10-CM

## 2023-09-22 PROCEDURE — 99213 PR OFFICE/OUTPT VISIT, EST, LEVL III, 20-29 MIN: ICD-10-PCS | Mod: S$PBB,,, | Performed by: NURSE PRACTITIONER

## 2023-09-22 PROCEDURE — 1159F PR MEDICATION LIST DOCUMENTED IN MEDICAL RECORD: ICD-10-PCS | Mod: CPTII,,, | Performed by: NURSE PRACTITIONER

## 2023-09-22 PROCEDURE — 3288F FALL RISK ASSESSMENT DOCD: CPT | Mod: CPTII,,, | Performed by: NURSE PRACTITIONER

## 2023-09-22 PROCEDURE — 3008F BODY MASS INDEX DOCD: CPT | Mod: CPTII,,, | Performed by: NURSE PRACTITIONER

## 2023-09-22 PROCEDURE — 3079F DIAST BP 80-89 MM HG: CPT | Mod: CPTII,,, | Performed by: NURSE PRACTITIONER

## 2023-09-22 PROCEDURE — 3288F PR FALLS RISK ASSESSMENT DOCUMENTED: ICD-10-PCS | Mod: CPTII,,, | Performed by: NURSE PRACTITIONER

## 2023-09-22 PROCEDURE — 3008F PR BODY MASS INDEX (BMI) DOCUMENTED: ICD-10-PCS | Mod: CPTII,,, | Performed by: NURSE PRACTITIONER

## 2023-09-22 PROCEDURE — 1159F MED LIST DOCD IN RCRD: CPT | Mod: CPTII,,, | Performed by: NURSE PRACTITIONER

## 2023-09-22 PROCEDURE — 1101F PR PT FALLS ASSESS DOC 0-1 FALLS W/OUT INJ PAST YR: ICD-10-PCS | Mod: CPTII,,, | Performed by: NURSE PRACTITIONER

## 2023-09-22 PROCEDURE — 99213 OFFICE O/P EST LOW 20 MIN: CPT | Mod: S$PBB,,, | Performed by: NURSE PRACTITIONER

## 2023-09-22 PROCEDURE — 3075F SYST BP GE 130 - 139MM HG: CPT | Mod: CPTII,,, | Performed by: NURSE PRACTITIONER

## 2023-09-22 PROCEDURE — 1101F PT FALLS ASSESS-DOCD LE1/YR: CPT | Mod: CPTII,,, | Performed by: NURSE PRACTITIONER

## 2023-09-22 PROCEDURE — 99214 OFFICE O/P EST MOD 30 MIN: CPT | Mod: PBBFAC | Performed by: NURSE PRACTITIONER

## 2023-09-22 PROCEDURE — 3079F PR MOST RECENT DIASTOLIC BLOOD PRESSURE 80-89 MM HG: ICD-10-PCS | Mod: CPTII,,, | Performed by: NURSE PRACTITIONER

## 2023-09-22 PROCEDURE — 3075F PR MOST RECENT SYSTOLIC BLOOD PRESS GE 130-139MM HG: ICD-10-PCS | Mod: CPTII,,, | Performed by: NURSE PRACTITIONER

## 2023-09-22 NOTE — PROGRESS NOTES
Faviola King is a 63 y.o. female here for No chief complaint on file.        PCP: Shantanu Larson  Referring Provider: No referring provider defined for this encounter.     HPI:  Presents for follow-up with history of weight loss.  Weight loss has now resolved.  Patient reports that she did have surgery in [place] due to malignant neoplasm of the lung.  States that surgery was performed on .  I do not have those op notes for review.  States that she is doing much better.  Appetite has improved.  Weight in 2023 was 150 lb today weight is 152.  She denies any GI complaints today.  Reports that she continues to have some shortness of breath.  She did have a recent COVID infection.  Colonoscopy was 08/10/2022, tubular adenoma.  Labs from  reviewed, LFTs are normal, no anemia.  Last office visit with Pulmonary here was in February.          ROS:  Review of Systems   Constitutional:  Negative for appetite change, fatigue, fever and unexpected weight change.   HENT:  Negative for trouble swallowing.    Respiratory:  Negative for shortness of breath.    Cardiovascular:  Negative for chest pain.   Gastrointestinal:  Positive for reflux. Negative for abdominal pain, blood in stool, change in bowel habit, constipation, diarrhea, nausea and vomiting.   Musculoskeletal:  Negative for gait problem.   Integumentary:  Positive for rash. Negative for pallor.   Psychiatric/Behavioral:  The patient is not nervous/anxious.           PMHX:  has a past medical history of GERD (gastroesophageal reflux disease), Heart attack (2017), Hypertension, and Nodule of upper lobe of right lung.    PSHX:  has a past surgical history that includes Hysterectomy and  section.    PFHX: family history includes Heart failure in her mother; Hypertension in her father and mother.    PSlHX:  reports that she has been smoking cigarettes. She has a 40.0 pack-year smoking history. She has never used smokeless tobacco. She  "reports current drug use. Drug: Marijuana. She reports that she does not drink alcohol.        Review of patient's allergies indicates:  No Known Allergies    Medication List with Changes/Refills   Current Medications    ALPRAZOLAM (XANAX) 0.25 MG TABLET    Take 1 tablet (0.25 mg total) by mouth nightly as needed for Anxiety.    AMLODIPINE (NORVASC) 10 MG TABLET    Take 1 tablet (10 mg total) by mouth once daily.    CARVEDILOL (COREG) 6.25 MG TABLET    Take 1 tablet by mouth twice daily    METHYLPREDNISOLONE (MEDROL DOSEPACK) 4 MG TABLET    use as directed    NICOTINE (NICODERM CQ) 14 MG/24 HR    Place 1 patch onto the skin once daily.    ONDANSETRON (ZOFRAN-ODT) 4 MG TBDL    Take 2 tablets (8 mg total) by mouth 2 (two) times daily.    OXYCODONE (ROXICODONE) 5 MG IMMEDIATE RELEASE TABLET    Take 5 mg by mouth every 6 (six) hours as needed.    PANTOPRAZOLE (PROTONIX) 40 MG TABLET    Take 1 tablet (40 mg total) by mouth once daily.    POTASSIUM CHLORIDE SA (K-DUR,KLOR-CON) 20 MEQ TABLET    Take 1 tablet (20 mEq total) by mouth 3 (three) times daily.    TRAZODONE (DESYREL) 50 MG TABLET    Take 25 mg by mouth nightly as needed.   Discontinued Medications    CYPROHEPTADINE (PERIACTIN) 4 MG TABLET    Take 0.5 tablets (2 mg total) by mouth 3 (three) times daily.        Objective Findings:  Vital Signs:  /89   Pulse 81   Ht 4' 11" (1.499 m)   Wt 69.3 kg (152 lb 12.8 oz)   BMI 30.86 kg/m²  Body mass index is 30.86 kg/m².    Physical Exam:  Physical Exam  Vitals and nursing note reviewed.   Constitutional:       General: She is not in acute distress.     Appearance: Normal appearance.   HENT:      Mouth/Throat:      Mouth: Mucous membranes are moist.   Cardiovascular:      Rate and Rhythm: Normal rate.   Pulmonary:      Effort: Pulmonary effort is normal.      Breath sounds: No wheezing, rhonchi or rales.   Abdominal:      General: Bowel sounds are normal. There is no distension.      Palpations: Abdomen is soft. " There is no mass.      Tenderness: There is no abdominal tenderness. There is no guarding.      Hernia: No hernia is present.   Skin:     General: Skin is warm and dry.      Coloration: Skin is not jaundiced or pale.   Neurological:      Mental Status: She is alert and oriented to person, place, and time.   Psychiatric:         Mood and Affect: Mood normal.          Labs:  Lab Results   Component Value Date    WBC 5.67 09/01/2023    HGB 13.4 09/01/2023    HCT 39.6 09/01/2023    MCV 85.0 09/01/2023    RDW 14.4 09/01/2023     09/01/2023    LYMPH 31.6 09/01/2023    LYMPH 1.79 09/01/2023    MONO 8.1 (H) 09/01/2023    EOS 0.11 09/01/2023    BASO 0.02 09/01/2023     Lab Results   Component Value Date     (L) 09/01/2023    K 3.4 (L) 09/01/2023    CL 99 09/01/2023    CO2 25 09/01/2023    GLU 84 09/01/2023    BUN 11 09/01/2023    CREATININE 0.90 09/01/2023    CALCIUM 9.1 09/01/2023    PROT 8.2 09/01/2023    ALBUMIN 3.4 (L) 09/01/2023    BILITOT 0.4 09/01/2023    ALKPHOS 66 09/01/2023    AST 32 09/01/2023    ALT 21 09/01/2023         Imaging: XR ABDOMEN, ACUTE 2 OR MORE VIEWS WITH CHEST    Result Date: 8/27/2023  EXAMINATION: XR ABDOMEN ACUTE 2 OR MORE VIEWS WITH CHEST CLINICAL HISTORY: cough, fever and constipation; TECHNIQUE: Abdomen flat and upright COMPARISON: Prior radiograph 07/20/2023 FINDINGS: Bowel gas pattern is nonspecific and within normal limits. No abnormally dilated small bowel loops to suggest obstruction. There is no free air within the abdomen. There is a moderate amount of stool seen throughout the colon. No abnormal calcific densities project within the abdomen. Lung bases are predominantly clear. There is no acute osseous abnormality.     No acute radiographic abnormality in the abdomen. A mild-moderate degree of fecal stasis/constipation is suspected. [facility] of service: [facility] Electronically signed by: Sher Roy Date:    08/27/2023  Time:    16:36        Assessment:  Faviola King is a 63 y.o. female here with:  1. Gastroesophageal reflux disease, unspecified whether esophagitis present    2. Malignant neoplasm of lung, unspecified laterality, unspecified part of lung          Recommendations:  1. May continue PPI  2. Request records from [facility]  3. Follow[facility] at [facility]    Follow up in about 6 months (around 3/22/2024).      Order summary:       Thank you for allowing me to participate in the care of Faviola King.      NIDA FranciscoC

## 2023-09-28 NOTE — ED PROVIDER NOTES
Encounter Date: 2023       History     Chief Complaint   Patient presents with    Nasal Congestion     Patient presents to the ER complaint of congestion and constipation.  Patient reports her symptoms started on Friday of last week.  She states she was not had a bowel movement since Thursday of last week.  She lower abdominal cramping.  She denies nausea vomiting or diarrhea.  She denies fever.  She reports sinus pressure and headache.  She states she has green nasal mucus and a nonproductive cough.    The history is provided by the patient. No  was used.     Review of patient's allergies indicates:  No Known Allergies  Past Medical History:   Diagnosis Date    GERD (gastroesophageal reflux disease)     Heart attack     Hypertension     Nodule of upper lobe of right lung     1.5 x 1.2 cm nodule RUL  23     Past Surgical History:   Procedure Laterality Date     SECTION      HYSTERECTOMY       Family History   Problem Relation Age of Onset    Heart failure Mother     Hypertension Mother     Hypertension Father     Diabetes Neg Hx     Cancer Neg Hx      Social History     Tobacco Use    Smoking status: Some Days     Current packs/day: 1.00     Average packs/day: 1 pack/day for 40.0 years (40.0 ttl pk-yrs)     Types: Cigarettes    Smokeless tobacco: Never    Tobacco comments:     1 PPD smoking history  for ~ 40 years: over the last year cut back to ~ 1/2 PPD: recently to 1 or 2 cigarettes/day   Substance Use Topics    Alcohol use: Never    Drug use: Yes     Types: Marijuana     Review of Systems   Constitutional:  Positive for activity change, appetite change, chills and fatigue.   HENT:  Positive for congestion, sinus pressure and sinus pain.    Genitourinary:  Positive for frequency and urgency.   All other systems reviewed and are negative.      Physical Exam     Initial Vitals [23 1501]   BP Pulse Resp Temp SpO2   (!) 143/79 103 18 100 °F (37.8 °C) 97 %      MAP        --         Physical Exam    Nursing note and vitals reviewed.  Constitutional: She appears well-developed and well-nourished.   HENT:   Head: Normocephalic.   Right Ear: External ear normal.   Left Ear: External ear normal.   Mouth/Throat: Oropharyngeal exudate present.   Eyes: EOM are normal.   Neck:   Normal range of motion.  Cardiovascular:  Normal rate, regular rhythm and intact distal pulses.           Pulmonary/Chest: Breath sounds normal.   Abdominal: Abdomen is soft. Bowel sounds are normal.   Musculoskeletal:         General: Normal range of motion.      Cervical back: Normal range of motion.     Neurological: She is alert and oriented to person, place, and time. She has normal strength. GCS score is 15. GCS eye subscore is 4. GCS verbal subscore is 5. GCS motor subscore is 6.   Skin: Skin is warm and dry. Capillary refill takes less than 2 seconds.   Psychiatric: She has a normal mood and affect.         Medical Screening Exam   See Full Note    ED Course   Procedures  Labs Reviewed   CULTURE, URINE - Abnormal; Notable for the following components:       Result Value    Culture, Urine 20,000 Enterococcus faecalis (*)     All other components within normal limits   SARS-COV-2 RNA AMPLIFICATION, QUAL - Abnormal; Notable for the following components:    SARS COV-2 MOLECULAR Positive (*)     All other components within normal limits   COMPREHENSIVE METABOLIC PANEL - Abnormal; Notable for the following components:    Potassium 3.2 (*)     BUN 19 (*)     Creatinine 1.15 (*)     Total Protein 9.0 (*)     Globulin 5.3 (*)     eGFR 53 (*)     All other components within normal limits   URINALYSIS, REFLEX TO URINE CULTURE - Abnormal; Notable for the following components:    Leukocytes, UA Large (*)     Protein, UA 30 (*)     Blood, UA Small (*)     All other components within normal limits   CBC WITH DIFFERENTIAL - Abnormal; Notable for the following components:    RDW 14.6 (*)     MPV 9.0 (*)     Neutrophils %  48.2 (*)     Monocytes % 20.8 (*)     Immature Granulocytes % 0.7 (*)     Monocytes, Absolute 1.24 (*)     All other components within normal limits   URINALYSIS, MICROSCOPIC - Abnormal; Notable for the following components:    WBC, UA 40 (*)     RBC, UA 7 (*)     Bacteria, UA Few (*)     Squamous Epithelial Cells, UA Occasional (*)     Hyaline Casts, UA 0-2 (*)     Transitional Epithelial Cells, UA Occasional (*)     Mucous Occasional (*)     All other components within normal limits   RAPID INFLUENZA A/B - Normal   CBC W/ AUTO DIFFERENTIAL    Narrative:     The following orders were created for panel order CBC auto differential.  Procedure                               Abnormality         Status                     ---------                               -----------         ------                     CBC with Differential[[id number]]        Abnormal            Final result                 Please view results for these tests on the individual orders.          Imaging Results              XR ABDOMEN, ACUTE 2 OR MORE VIEWS WITH CHEST (Final result)  Result time 08/27/23 16:36:49      Final result by Sher Roy MD (08/27/23 16:36:49)                   Impression:      No acute radiographic abnormality in the abdomen.    A mild-moderate degree of fecal stasis/constipation is suspected.    [facility] of service[facility]      Electronically signed by: Sher Roy  Date:    08/27/2023  Time:    16:36               Narrative:    EXAMINATION:  XR ABDOMEN ACUTE 2 OR MORE VIEWS WITH CHEST    CLINICAL HISTORY:  cough, fever and constipation;    TECHNIQUE:  Abdomen flat and upright    COMPARISON:  Prior radiograph 07/20/2023    FINDINGS:  Bowel gas pattern is nonspecific and within normal limits. No abnormally dilated small bowel loops to suggest obstruction. There is no free air within the abdomen. There is a moderate amount of stool seen throughout the colon.    No abnormal calcific densities project  within the abdomen.    Lung bases are predominantly clear. There is no acute osseous abnormality.                                       Medications   cefTRIAXone (ROCEPHIN) 1 g in LIDOcaine HCL 10 mg/ml (1%) 4 mL IM only syringe (1 g Intramuscular Given 23)     Medical Decision Making  Patient presents to the ER complaint of congestion and constipation.  Patient reports her symptoms started on Friday of last week.  She states she was not had a bowel movement since Thursday of last week.  She lower abdominal cramping.  She denies nausea vomiting or diarrhea.  She denies fever.  She reports sinus pressure and headache.  She states she has green nasal mucus and a nonproductive cough.    Amount and/or Complexity of Data Reviewed  Labs: ordered. Decision-making details documented in ED Course.  Radiology: ordered. Decision-making details documented in ED Course.  Discussion of management or test interpretation with external provider(s): Rocephin 1 g IM to treat acute urinary tract infection.  Patient was discharged home with diagnosis of acute urinary tract infection and COVID.  She was given prescription of Macrobid to take medication as prescribed.  She was told to increase her fluid intake to keep hydrated.  She was told to quarantine for 5 days.  She was told to follow up with her primary care provider in 2 days if symptoms are.    Risk  Prescription drug management.                               Clinical Impression:   Final diagnoses:  [U07.1] COVID (Primary)  [N39.0] Acute urinary tract infection        ED Disposition Condition    Discharge Stable          ED Prescriptions       Medication Sig Dispense Start Date End Date Auth. Provider    nitrofurantoin, macrocrystal-monohydrate, (MACROBID) 100 MG capsule () Take 1 capsule (100 mg total) by mouth 2 (two) times daily. for 10 days 20 capsule 2023 Gila Meyers, GUILLERMOP          Follow-up Information       Follow up With Specialties  Details Why Contact Info    Shantanu Larson MD Internal Medicine, Family Medicine, Hospitalist Schedule an appointment as soon as possible for a visit in 2 days If symptoms worsen [address] [facility] [address]  [phone]               Gila Meyers, FNP  09/28/23 0137

## 2023-10-19 ENCOUNTER — OFFICE VISIT (OUTPATIENT)
Dept: FAMILY MEDICINE | Facility: CLINIC | Age: 63
End: 2023-10-19
Payer: COMMERCIAL

## 2023-10-19 VITALS
RESPIRATION RATE: 18 BRPM | TEMPERATURE: 98 F | HEART RATE: 83 BPM | HEIGHT: 59 IN | OXYGEN SATURATION: 98 % | WEIGHT: 158.81 LBS | BODY MASS INDEX: 32.01 KG/M2 | SYSTOLIC BLOOD PRESSURE: 128 MMHG | DIASTOLIC BLOOD PRESSURE: 84 MMHG

## 2023-10-19 DIAGNOSIS — E66.9 OBESITY, UNSPECIFIED CLASSIFICATION, UNSPECIFIED OBESITY TYPE, UNSPECIFIED WHETHER SERIOUS COMORBIDITY PRESENT: ICD-10-CM

## 2023-10-19 DIAGNOSIS — I10 ESSENTIAL (PRIMARY) HYPERTENSION: ICD-10-CM

## 2023-10-19 DIAGNOSIS — R06.02 SOB (SHORTNESS OF BREATH): Primary | ICD-10-CM

## 2023-10-19 PROCEDURE — 1160F PR REVIEW ALL MEDS BY PRESCRIBER/CLIN PHARMACIST DOCUMENTED: ICD-10-PCS | Mod: CPTII,,, | Performed by: INTERNAL MEDICINE

## 2023-10-19 PROCEDURE — 1159F MED LIST DOCD IN RCRD: CPT | Mod: CPTII,,, | Performed by: INTERNAL MEDICINE

## 2023-10-19 PROCEDURE — 1101F PT FALLS ASSESS-DOCD LE1/YR: CPT | Mod: CPTII,,, | Performed by: INTERNAL MEDICINE

## 2023-10-19 PROCEDURE — 3008F BODY MASS INDEX DOCD: CPT | Mod: CPTII,,, | Performed by: INTERNAL MEDICINE

## 2023-10-19 PROCEDURE — 3288F PR FALLS RISK ASSESSMENT DOCUMENTED: ICD-10-PCS | Mod: CPTII,,, | Performed by: INTERNAL MEDICINE

## 2023-10-19 PROCEDURE — 3074F SYST BP LT 130 MM HG: CPT | Mod: CPTII,,, | Performed by: INTERNAL MEDICINE

## 2023-10-19 PROCEDURE — 99214 OFFICE O/P EST MOD 30 MIN: CPT | Mod: ,,, | Performed by: INTERNAL MEDICINE

## 2023-10-19 PROCEDURE — 3008F PR BODY MASS INDEX (BMI) DOCUMENTED: ICD-10-PCS | Mod: CPTII,,, | Performed by: INTERNAL MEDICINE

## 2023-10-19 PROCEDURE — 3079F DIAST BP 80-89 MM HG: CPT | Mod: CPTII,,, | Performed by: INTERNAL MEDICINE

## 2023-10-19 PROCEDURE — 1159F PR MEDICATION LIST DOCUMENTED IN MEDICAL RECORD: ICD-10-PCS | Mod: CPTII,,, | Performed by: INTERNAL MEDICINE

## 2023-10-19 PROCEDURE — 99214 PR OFFICE/OUTPT VISIT, EST, LEVL IV, 30-39 MIN: ICD-10-PCS | Mod: ,,, | Performed by: INTERNAL MEDICINE

## 2023-10-19 PROCEDURE — 3288F FALL RISK ASSESSMENT DOCD: CPT | Mod: CPTII,,, | Performed by: INTERNAL MEDICINE

## 2023-10-19 PROCEDURE — 1101F PR PT FALLS ASSESS DOC 0-1 FALLS W/OUT INJ PAST YR: ICD-10-PCS | Mod: CPTII,,, | Performed by: INTERNAL MEDICINE

## 2023-10-19 PROCEDURE — 3079F PR MOST RECENT DIASTOLIC BLOOD PRESSURE 80-89 MM HG: ICD-10-PCS | Mod: CPTII,,, | Performed by: INTERNAL MEDICINE

## 2023-10-19 PROCEDURE — 3074F PR MOST RECENT SYSTOLIC BLOOD PRESSURE < 130 MM HG: ICD-10-PCS | Mod: CPTII,,, | Performed by: INTERNAL MEDICINE

## 2023-10-19 PROCEDURE — 1160F RVW MEDS BY RX/DR IN RCRD: CPT | Mod: CPTII,,, | Performed by: INTERNAL MEDICINE

## 2023-10-19 RX ORDER — ALBUTEROL SULFATE 90 UG/1
2 AEROSOL, METERED RESPIRATORY (INHALATION) 2 TIMES DAILY
Qty: 18 G | Refills: 6 | Status: SHIPPED | OUTPATIENT
Start: 2023-10-19 | End: 2024-03-18 | Stop reason: SDUPTHER

## 2023-10-19 RX ORDER — METHOCARBAMOL 500 MG/1
500 TABLET, FILM COATED ORAL EVERY 6 HOURS PRN
COMMUNITY
Start: 2023-07-23

## 2023-10-21 PROBLEM — R06.02 SOB (SHORTNESS OF BREATH): Status: ACTIVE | Noted: 2023-10-21

## 2023-10-21 NOTE — PROGRESS NOTES
Subjective:       Patient ID: Faviola King is a 63 y.o. female.    Chief Complaint: Follow-up  Patient seen in follow-up today patient complains of worsening shortness of breath but on exam lungs are clear.  Patient does have a history of tobacco use.  Patient complains of a cough.  Patient has chronic hypertension and chronic obesity.  Blood pressure is stable today.  Today I am going to order albuterol inhaler 2 puffs 2 times a day p.o. p.r.n. shortness for breath and also schedule the patient for pulmonary function test.  She does give a history of a lung nodule which was removed.  In regards to obesity I recommend exercise lifestyle modification and low-carbohydrate diet.  Follow-up  Pertinent negatives include no abdominal pain, chest pain, fatigue or fever.     .    Current Medications:    Current Outpatient Medications:     amLODIPine (NORVASC) 10 MG tablet, Take 1 tablet (10 mg total) by mouth once daily., Disp: 90 tablet, Rfl: 1    carvediloL (COREG) 6.25 MG tablet, Take 1 tablet by mouth twice daily, Disp: 180 tablet, Rfl: 0    methocarbamoL (ROBAXIN) 500 MG Tab, Take 500 mg by mouth every 6 (six) hours as needed., Disp: , Rfl:     oxyCODONE (ROXICODONE) 5 MG immediate release tablet, Take 5 mg by mouth every 6 (six) hours as needed., Disp: , Rfl:     pantoprazole (PROTONIX) 40 MG tablet, Take 1 tablet (40 mg total) by mouth once daily., Disp: 90 tablet, Rfl: 1    potassium chloride SA (K-DUR,KLOR-CON) 20 MEQ tablet, Take 1 tablet (20 mEq total) by mouth 3 (three) times daily., Disp: 180 tablet, Rfl: 1    traZODone (DESYREL) 50 MG tablet, Take 25 mg by mouth nightly as needed., Disp: , Rfl:     albuterol (VENTOLIN HFA) 90 mcg/actuation inhaler, Inhale 2 puffs into the lungs 2 (two) times a day. Rescue, Disp: 18 g, Rfl: 6    ALPRAZolam (XANAX) 0.25 MG tablet, Take 1 tablet (0.25 mg total) by mouth nightly as needed for Anxiety. (Patient not taking: Reported on 7/18/2023), Disp: 30 tablet, Rfl: 1    nicotine  "(NICODERM CQ) 14 mg/24 hr, Place 1 patch onto the skin once daily. (Patient not taking: Reported on 2/27/2023), Disp: 30 patch, Rfl: 1    ondansetron (ZOFRAN-ODT) 4 MG TbDL, Take 2 tablets (8 mg total) by mouth 2 (two) times daily. (Patient not taking: Reported on 1/26/2023), Disp: 20 tablet, Rfl: 0           Review of Systems   Constitutional:  Negative for appetite change, fatigue and fever.   Respiratory:  Negative for shortness of breath.    Cardiovascular:  Negative for chest pain.   Gastrointestinal:  Negative for abdominal pain and constipation.   Endocrine: Negative for polydipsia, polyphagia and polyuria.   Genitourinary:  Negative for difficulty urinating, frequency and hot flashes.   Allergic/Immunologic: Negative for environmental allergies.   Neurological:  Negative for dizziness and light-headedness.   Psychiatric/Behavioral:  Negative for agitation.                 Vitals:    10/19/23 1337   BP: 128/84   BP Location: Right arm   Patient Position: Sitting   BP Method: Large (Automatic)   Pulse: 83   Resp: 18   Temp: 97.8 °F (36.6 °C)   TempSrc: Temporal   SpO2: 98%   Weight: 72 kg (158 lb 12.8 oz)   Height: 4' 11" (1.499 m)        Physical Exam  Vitals and nursing note reviewed.   Constitutional:       Appearance: Normal appearance.   Cardiovascular:      Rate and Rhythm: Normal rate and regular rhythm.      Pulses: Normal pulses.      Heart sounds: Normal heart sounds.   Pulmonary:      Effort: Pulmonary effort is normal.      Breath sounds: Wheezing present.   Abdominal:      General: Abdomen is flat. Bowel sounds are normal.      Palpations: Abdomen is soft.   Musculoskeletal:         General: Normal range of motion.   Skin:     General: Skin is warm and dry.   Neurological:      General: No focal deficit present.      Mental Status: She is alert and oriented to person, place, and time. Mental status is at baseline.           Last Labs:     No visits with results within 1 Month(s) from this visit. "   Latest known visit with results is:   Admission on 09/01/2023, Discharged on 09/01/2023   Component Date Value    Sodium 09/01/2023 134 (L)     Potassium 09/01/2023 3.4 (L)     Chloride 09/01/2023 99     CO2 09/01/2023 25     Anion Gap 09/01/2023 13     Glucose 09/01/2023 84     BUN 09/01/2023 11     Creatinine 09/01/2023 0.90     BUN/Creatinine Ratio 09/01/2023 12     Calcium 09/01/2023 9.1     Total Protein 09/01/2023 8.2     Albumin 09/01/2023 3.4 (L)     Globulin 09/01/2023 4.8 (H)     A/G Ratio 09/01/2023 0.7     Bilirubin, Total 09/01/2023 0.4     Alk Phos 09/01/2023 66     ALT 09/01/2023 21     AST 09/01/2023 32     eGFR 09/01/2023 71     WBC 09/01/2023 5.67     RBC 09/01/2023 4.66     Hemoglobin 09/01/2023 13.4     Hematocrit 09/01/2023 39.6     MCV 09/01/2023 85.0     MCH 09/01/2023 28.8     MCHC 09/01/2023 33.8     RDW 09/01/2023 14.4     Platelet Count 09/01/2023 236     MPV 09/01/2023 10.4     Neutrophils % 09/01/2023 57.6     Lymphocytes % 09/01/2023 31.6     Monocytes % 09/01/2023 8.1 (H)     Eosinophils % 09/01/2023 1.9     Basophils % 09/01/2023 0.4     Immature Granulocytes % 09/01/2023 0.4     nRBC, Auto 09/01/2023 0.0     Neutrophils, Abs 09/01/2023 3.27     Lymphocytes, Absolute 09/01/2023 1.79     Monocytes, Absolute 09/01/2023 0.46     Eosinophils, Absolute 09/01/2023 0.11     Basophils, Absolute 09/01/2023 0.02     Immature Granulocytes, A* 09/01/2023 0.02     nRBC, Absolute 09/01/2023 0.00     Diff Type 09/01/2023 Auto        Last Imaging:  XR ABDOMEN, ACUTE 2 OR MORE VIEWS WITH CHEST  Narrative: EXAMINATION:  XR ABDOMEN ACUTE 2 OR MORE VIEWS WITH CHEST    CLINICAL HISTORY:  cough, fever and constipation;    TECHNIQUE:  Abdomen flat and upright    COMPARISON:  Prior radiograph 07/20/2023    FINDINGS:  Bowel gas pattern is nonspecific and within normal limits. No abnormally dilated small bowel loops to suggest obstruction. There is no free air within the abdomen. There is a moderate amount  of stool seen throughout the colon.    No abnormal calcific densities project within the abdomen.    Lung bases are predominantly clear. There is no acute osseous abnormality.  Impression: No acute radiographic abnormality in the abdomen.    A mild-moderate degree of fecal stasis/constipation is suspected.    Place of service: [facility]    Electronically signed by: Sher Roy  Date:    08/27/2023  Time:    16:36         **Labs and x-rays personally reviewed by me    ** reviewed      Objective:        Assessment:       1. SOB (shortness of breath)  Complete PFT w/ bronchodilator      2. Essential (primary) hypertension        3. Obesity, unspecified classification, unspecified obesity type, unspecified whether serious comorbidity present             Plan:         [unfilled]

## 2023-10-24 ENCOUNTER — CLINICAL SUPPORT (OUTPATIENT)
Dept: PULMONOLOGY | Facility: HOSPITAL | Age: 63
End: 2023-10-24
Attending: INTERNAL MEDICINE
Payer: COMMERCIAL

## 2023-10-24 VITALS — OXYGEN SATURATION: 97 %

## 2023-10-24 DIAGNOSIS — R06.02 SOB (SHORTNESS OF BREATH): ICD-10-CM

## 2023-10-24 PROCEDURE — 94726 PULM FUNCT TST PLETHYSMOGRAP: ICD-10-PCS | Mod: 26,,, | Performed by: INTERNAL MEDICINE

## 2023-10-24 PROCEDURE — 94729 PR C02/MEMBANE DIFFUSE CAPACITY: ICD-10-PCS | Mod: 26,,, | Performed by: INTERNAL MEDICINE

## 2023-10-24 PROCEDURE — 94060 PR EVAL OF BRONCHOSPASM: ICD-10-PCS | Mod: 26,,, | Performed by: INTERNAL MEDICINE

## 2023-10-24 PROCEDURE — 27100098 HC SPACER

## 2023-10-24 PROCEDURE — 94729 DIFFUSING CAPACITY: CPT

## 2023-10-24 PROCEDURE — 94060 EVALUATION OF WHEEZING: CPT | Mod: 26,,, | Performed by: INTERNAL MEDICINE

## 2023-10-24 PROCEDURE — 94726 PLETHYSMOGRAPHY LUNG VOLUMES: CPT | Mod: 26,,, | Performed by: INTERNAL MEDICINE

## 2023-10-24 PROCEDURE — 94726 PLETHYSMOGRAPHY LUNG VOLUMES: CPT

## 2023-10-24 PROCEDURE — 94729 DIFFUSING CAPACITY: CPT | Mod: 26,,, | Performed by: INTERNAL MEDICINE

## 2023-10-24 PROCEDURE — 94060 EVALUATION OF WHEEZING: CPT

## 2023-10-24 NOTE — PROCEDURES
Pulmonary function test  Forced vital capacity 2.03 L 85% predicted  FEV1 1.36 L 72% predicted  FEV1 ratio 67%   Normal lung volumes  Decreased DLCO  Moderate obstructive ventilatory impairment decreased DLCO

## 2023-11-27 ENCOUNTER — HOSPITAL ENCOUNTER (OUTPATIENT)
Dept: RADIOLOGY | Facility: HOSPITAL | Age: 63
Discharge: HOME | End: 2023-11-27
Attending: INTERNAL MEDICINE
Payer: MEDICARE

## 2023-11-27 VITALS — HEIGHT: 59 IN | WEIGHT: 158 LBS | BODY MASS INDEX: 31.85 KG/M2

## 2023-11-27 DIAGNOSIS — Z12.39 ENCOUNTER FOR SCREENING FOR MALIGNANT NEOPLASM OF BREAST, UNSPECIFIED SCREENING MODALITY: ICD-10-CM

## 2023-11-27 PROCEDURE — 77067 SCR MAMMO BI INCL CAD: CPT | Mod: TC

## 2023-12-09 DIAGNOSIS — Z71.89 COMPLEX CARE COORDINATION: ICD-10-CM

## 2024-01-22 ENCOUNTER — OFFICE VISIT (OUTPATIENT)
Dept: FAMILY MEDICINE | Facility: CLINIC | Age: 64
End: 2024-01-22
Payer: MEDICARE

## 2024-01-22 VITALS
TEMPERATURE: 98 F | BODY MASS INDEX: 31.65 KG/M2 | DIASTOLIC BLOOD PRESSURE: 89 MMHG | HEIGHT: 59 IN | WEIGHT: 157 LBS | SYSTOLIC BLOOD PRESSURE: 131 MMHG | RESPIRATION RATE: 18 BRPM | OXYGEN SATURATION: 98 % | HEART RATE: 80 BPM

## 2024-01-22 DIAGNOSIS — Z78.0 POST-MENOPAUSAL: ICD-10-CM

## 2024-01-22 DIAGNOSIS — I10 ESSENTIAL (PRIMARY) HYPERTENSION: Primary | ICD-10-CM

## 2024-01-22 DIAGNOSIS — E66.9 OBESITY, UNSPECIFIED CLASSIFICATION, UNSPECIFIED OBESITY TYPE, UNSPECIFIED WHETHER SERIOUS COMORBIDITY PRESENT: ICD-10-CM

## 2024-01-22 DIAGNOSIS — E11.9 DM (DIABETES MELLITUS): ICD-10-CM

## 2024-01-22 LAB
ANION GAP SERPL CALCULATED.3IONS-SCNC: 8 MMOL/L (ref 7–16)
BUN SERPL-MCNC: 8 MG/DL (ref 7–18)
BUN/CREAT SERPL: 11 (ref 6–20)
CALCIUM SERPL-MCNC: 10 MG/DL (ref 8.5–10.1)
CHLORIDE SERPL-SCNC: 108 MMOL/L (ref 98–107)
CO2 SERPL-SCNC: 27 MMOL/L (ref 21–32)
CREAT SERPL-MCNC: 0.75 MG/DL (ref 0.55–1.02)
EGFR (NO RACE VARIABLE) (RUSH/TITUS): 88 ML/MIN/1.73M2
EST. AVERAGE GLUCOSE BLD GHB EST-MCNC: 114 MG/DL
GLUCOSE SERPL-MCNC: 89 MG/DL (ref 74–106)
HBA1C MFR BLD HPLC: 5.6 % (ref 4.5–6.6)
POTASSIUM SERPL-SCNC: 3.4 MMOL/L (ref 3.5–5.1)
SODIUM SERPL-SCNC: 140 MMOL/L (ref 136–145)

## 2024-01-22 PROCEDURE — 3008F BODY MASS INDEX DOCD: CPT | Mod: ,,, | Performed by: INTERNAL MEDICINE

## 2024-01-22 PROCEDURE — 80048 BASIC METABOLIC PNL TOTAL CA: CPT | Mod: ,,, | Performed by: CLINICAL MEDICAL LABORATORY

## 2024-01-22 PROCEDURE — 99214 OFFICE O/P EST MOD 30 MIN: CPT | Mod: ,,, | Performed by: INTERNAL MEDICINE

## 2024-01-22 PROCEDURE — 3075F SYST BP GE 130 - 139MM HG: CPT | Mod: ,,, | Performed by: INTERNAL MEDICINE

## 2024-01-22 PROCEDURE — 1126F AMNT PAIN NOTED NONE PRSNT: CPT | Mod: ,,, | Performed by: INTERNAL MEDICINE

## 2024-01-22 PROCEDURE — 1159F MED LIST DOCD IN RCRD: CPT | Mod: ,,, | Performed by: INTERNAL MEDICINE

## 2024-01-22 PROCEDURE — 83036 HEMOGLOBIN GLYCOSYLATED A1C: CPT | Mod: ,,, | Performed by: CLINICAL MEDICAL LABORATORY

## 2024-01-22 PROCEDURE — 3079F DIAST BP 80-89 MM HG: CPT | Mod: ,,, | Performed by: INTERNAL MEDICINE

## 2024-01-22 PROCEDURE — 3044F HG A1C LEVEL LT 7.0%: CPT | Mod: ,,, | Performed by: INTERNAL MEDICINE

## 2024-01-22 PROCEDURE — 1101F PT FALLS ASSESS-DOCD LE1/YR: CPT | Mod: ,,, | Performed by: INTERNAL MEDICINE

## 2024-01-22 PROCEDURE — 3288F FALL RISK ASSESSMENT DOCD: CPT | Mod: ,,, | Performed by: INTERNAL MEDICINE

## 2024-01-22 RX ORDER — ATORVASTATIN CALCIUM 40 MG/1
40 TABLET, FILM COATED ORAL DAILY
Qty: 90 TABLET | Refills: 1 | Status: SHIPPED | OUTPATIENT
Start: 2024-01-22 | End: 2024-03-18 | Stop reason: SDUPTHER

## 2024-01-24 DIAGNOSIS — E87.6 HYPOKALEMIA: Primary | ICD-10-CM

## 2024-01-24 RX ORDER — POTASSIUM CHLORIDE 20 MEQ/1
20 TABLET, EXTENDED RELEASE ORAL 2 TIMES DAILY
Qty: 4 TABLET | Refills: 0 | Status: SHIPPED | OUTPATIENT
Start: 2024-01-24

## 2024-01-24 NOTE — TELEPHONE ENCOUNTER
----- Message from Shantanu Larson MD sent at 1/24/2024  3:57 PM CST -----  KCl 20 mEq 1 p.o. b.i.d. x2 days   Number pills 4   Repeat potassium level on Monday

## 2024-01-26 PROBLEM — Z78.0 POST-MENOPAUSAL: Status: ACTIVE | Noted: 2024-01-26

## 2024-01-26 PROBLEM — E11.9 DM (DIABETES MELLITUS): Status: ACTIVE | Noted: 2024-01-26

## 2024-01-27 NOTE — PROGRESS NOTES
Subjective:       Patient ID: Faviola King is a 63 y.o. female.    Chief Complaint: Shortness of Breath (Follow up )    Shortness of Breath  Pertinent negatives include no abdominal pain, chest pain or fever.     Patient presents with chronic hypertension chronic obesity and chronic dyslipidemia today.  Also the patient has hypokalemia and hyponatremia.  Otherwise today patient voiced no complaints.  Today I am going to order a follow-up BMP.  I am going to resume the Lipitor 40 mg 1 p.o. q.day for dyslipidemia  Health maintenance issues  Check A1c and a DEXA scan.  For obesity I recommend exercise 30 minutes a day low-carbohydrate diet lifestyle modification  .    Current Medications:    Current Outpatient Medications:     albuterol (VENTOLIN HFA) 90 mcg/actuation inhaler, Inhale 2 puffs into the lungs 2 (two) times a day. Rescue, Disp: 18 g, Rfl: 6    amLODIPine (NORVASC) 10 MG tablet, Take 1 tablet (10 mg total) by mouth once daily., Disp: 90 tablet, Rfl: 1    carvediloL (COREG) 6.25 MG tablet, Take 1 tablet by mouth twice daily, Disp: 180 tablet, Rfl: 0    methocarbamoL (ROBAXIN) 500 MG Tab, Take 500 mg by mouth every 6 (six) hours as needed., Disp: , Rfl:     oxyCODONE (ROXICODONE) 5 MG immediate release tablet, Take 5 mg by mouth every 6 (six) hours as needed., Disp: , Rfl:     pantoprazole (PROTONIX) 40 MG tablet, Take 1 tablet (40 mg total) by mouth once daily., Disp: 90 tablet, Rfl: 1    traZODone (DESYREL) 50 MG tablet, Take 25 mg by mouth nightly as needed., Disp: , Rfl:     ALPRAZolam (XANAX) 0.25 MG tablet, Take 1 tablet (0.25 mg total) by mouth nightly as needed for Anxiety. (Patient not taking: Reported on 7/18/2023), Disp: 30 tablet, Rfl: 1    atorvastatin (LIPITOR) 40 MG tablet, Take 1 tablet (40 mg total) by mouth once daily., Disp: 90 tablet, Rfl: 1    nicotine (NICODERM CQ) 14 mg/24 hr, Place 1 patch onto the skin once daily. (Patient not taking: Reported on 2/27/2023), Disp: 30 patch, Rfl: 1     "ondansetron (ZOFRAN-ODT) 4 MG TbDL, Take 2 tablets (8 mg total) by mouth 2 (two) times daily. (Patient not taking: Reported on 1/26/2023), Disp: 20 tablet, Rfl: 0    potassium chloride SA (K-DUR,KLOR-CON) 20 MEQ tablet, Take 1 tablet (20 mEq total) by mouth 2 (two) times daily., Disp: 4 tablet, Rfl: 0           Review of Systems   Constitutional:  Negative for appetite change, fatigue and fever.   Respiratory:  Positive for shortness of breath.    Cardiovascular:  Negative for chest pain.   Gastrointestinal:  Negative for abdominal pain and constipation.   Endocrine: Negative for polydipsia, polyphagia and polyuria.   Genitourinary:  Negative for difficulty urinating, frequency and hot flashes.   Allergic/Immunologic: Negative for environmental allergies.   Neurological:  Negative for dizziness and light-headedness.   Psychiatric/Behavioral:  Negative for agitation.                 Vitals:    01/22/24 1418   BP: 131/89   BP Location: Right arm   Patient Position: Sitting   BP Method: Large (Automatic)   Pulse: 80   Resp: 18   Temp: 97.7 °F (36.5 °C)   TempSrc: Temporal   SpO2: 98%   Weight: 71.2 kg (157 lb)   Height: 4' 11" (1.499 m)        Physical Exam  Vitals and nursing note reviewed.   Constitutional:       Appearance: Normal appearance. She is obese.   Cardiovascular:      Rate and Rhythm: Normal rate and regular rhythm.      Pulses: Normal pulses.      Heart sounds: Normal heart sounds.   Pulmonary:      Effort: Pulmonary effort is normal.      Breath sounds: Normal breath sounds.   Abdominal:      General: Abdomen is flat. Bowel sounds are normal.      Palpations: Abdomen is soft.   Musculoskeletal:         General: Normal range of motion.   Skin:     General: Skin is warm and dry.   Neurological:      General: No focal deficit present.      Mental Status: She is alert and oriented to person, place, and time. Mental status is at baseline.           Last Labs:     Office Visit on 01/22/2024   Component Date " Value    Sodium 01/22/2024 140     Potassium 01/22/2024 3.4 (L)     Chloride 01/22/2024 108 (H)     CO2 01/22/2024 27     Anion Gap 01/22/2024 8     Glucose 01/22/2024 89     BUN 01/22/2024 8     Creatinine 01/22/2024 0.75     BUN/Creatinine Ratio 01/22/2024 11     Calcium 01/22/2024 10.0     eGFR 01/22/2024 88     Hemoglobin A1C 01/22/2024 5.6     Estimated Average Glucose 01/22/2024 114        Last Imaging:  Mammo Digital Screening Bilat w/ Ravinder  Narrative: Result:   Mammo Digital Screening Bilat w/ Ravinder     History:  Patient is 63 y.o. and is seen for a screening mammogram.    Films Compared:  Baseline      Findings:  This procedure was performed using tomosynthesis. Computer-aided detection   was utilized in the interpretation of this examination.  The breasts have scattered areas of fibroglandular density. There is no   evidence of suspicious masses, calcifications, or other abnormal findings.  Impression: Bilateral  There is no mammographic evidence of malignancy.    BI-RADS Category:   Overall: 2 - Benign       Recommendation:  Routine screening mammogram in 1 year is recommended.    Your estimated lifetime risk of breast cancer (to age 85) based on   Tyrer-Cuzick risk assessment model is Tyrer-Cuzick: 3.94 %. According to   the [employer] Society, patients with a lifetime breast cancer risk   of 20% or higher might benefit from supplemental screening tests.         **Labs and x-rays personally reviewed by me    ** reviewed      Objective:        Assessment:       1. Essential (primary) hypertension  Basic Metabolic Panel    Hemoglobin A1C    Basic Metabolic Panel    Hemoglobin A1C      2. DM (diabetes mellitus)  Hemoglobin A1C    Hemoglobin A1C      3. Post-menopausal  DXA Bone Density Axial Skeleton 1 or more sites      4. Obesity, unspecified classification, unspecified obesity type, unspecified whether serious comorbidity present             Plan:         1. Essential (primary) hypertension  -      Basic Metabolic Panel; Future; Expected date: 01/22/2024  -     Hemoglobin A1C; Future; Expected date: 01/22/2024    2. DM (diabetes mellitus)  -     Hemoglobin A1C; Future; Expected date: 01/22/2024    3. Post-menopausal  -     DXA Bone Density Axial Skeleton 1 or more sites; Future; Expected date: 01/22/2024    4. Obesity, unspecified classification, unspecified obesity type, unspecified whether serious comorbidity present    Other orders  -     atorvastatin (LIPITOR) 40 MG tablet; Take 1 tablet (40 mg total) by mouth once daily.  Dispense: 90 tablet; Refill: 1

## 2024-02-06 NOTE — TELEPHONE ENCOUNTER
Office Visit- Urology  Cinthya Howard 1974 MRN: [id number]      Assessment/Discussion/Plan    50 y.o. female managed by     Bilateral flank pain  -Had a CT a the abdomen pelvis with and without contrast in December 2023 with probable cyst the largest measuring 1 cm in the lower pole of the left kidney.  Performed at outside institution but no mention of hydronephrosis.  Urinary bladder without abnormality that point in time  -As pain does not seem to be urologic based on imaging I would agree that patient should follow-up with comprehensive spine program as currently recommended    2.  Microscopic hematuria  -Patient with AUA defined microscopic hematuria with last measurement on 12/17/2023 measuring 3-5 RBC  -CT scan as above with no concerning urologic findings at this point in time   -obtain cystoscopy for completion of microscopic hematuria workup    3.  Lower urinary tract symptoms  -UA with mild leukocytes.  Otherwise unremarkable  -PVR 25 mL  -Frequency of urination but patient states that she drinks significant mount of water  -She did not find her symptomatology bothersome      Chief Complaint:   Cinthya is a 50 y.o. female presenting to the office for a follow up visit regarding microscopic hematuria        Subjective    Patient is a 50-year-old female with a longstanding history of flank pain.  She was last in the office in October 2021 for evaluation of flank pain.  She has had numerous abdominal imaging studies that did not demonstrate hydronephrosis or nephrolithiasis.  She presents to the office today for evaluation of microscopic hematuria.  In December 2023 she had a urinalysis demonstrating 3-5 RBCs seen on microscopy.  She denies ever seeing gross hematuria.  She denies a smoking history.  She works as a .  No known  malignancy.  She states that her mother does have microscopic hematuria and has had previous cystoscopy patient does state that she has increased frequency but  Results and recommendations called to patient. Verbalized understanding. Scheduled for CT chest for 1/12/23 at 11:00 am.            ----- Message from MIKE Davis sent at 12/28/2022  4:50 PM CST -----  Need CT chest for prominence of the paratracheal stripe. Also has a previously enlarged lymph node.   "states that she drinks a significant amount of water.  She had  recent testing that did not demonstrate an elevated blood sugar.  She had a CTA of the abdomen pelvis that in December 2023 performed outside institution which demonstrated \" a few scattered predominantly subcentimeter hypodense renal lesions-probable cysts the largest measuring 1 cm\".  Patient was referred to comprehensive spine program due to degenerative changes seen on imaging and her longstanding back/flank pain        ROS:   Review of Systems   Constitutional: Negative.  Negative for chills, fatigue and fever.   HENT: Negative.     Respiratory:  Negative for shortness of breath.    Cardiovascular:  Negative for chest pain.   Gastrointestinal: Negative.  Negative for abdominal pain.   Endocrine: Negative.    Musculoskeletal: Negative.    Skin: Negative.    Neurological: Negative.  Negative for dizziness and light-headedness.   Hematological: Negative.    Psychiatric/Behavioral: Negative.           Past Medical History  Past Medical History:   Diagnosis Date    Arthritis     Asthma     Disease of thyroid gland     Gastroparesis     Hematuria     Hypokalemia 09/09/2022    Hypoparathyroidism (HCC)     Nephrolithiasis 07/22/2019    Osteoporosis     Partial symptomatic epilepsy with complex partial seizures, not intractable, without status epilepticus (HCC)     Partial symptompatic epilepsy     Seizure disorder (HCC)        Past Surgical History  Past Surgical History:   Procedure Laterality Date    APPENDECTOMY      COLONOSCOPY      EGD      FRACTURE SURGERY Right     Knee    HYSTERECTOMY      OTHER SURGICAL HISTORY      left foot, rt knee, tonselectomy    SINUS SURGERY      TONSILLECTOMY         Past Family History  Family History   Problem Relation Age of Onset    Alcohol abuse Mother     RUSSELL disease Mother     Boles's esophagus Mother     Migraines Father     Prostate cancer Father     Cancer Father     Alcohol abuse Maternal Uncle     Seizures " Paternal Aunt     Lung cancer Paternal Aunt     Prostate cancer Paternal Uncle     Colon cancer Paternal Uncle        Past Social history  Social History     Socioeconomic History    Marital status: Single     Spouse name: Not on file    Number of children: Not on file    Years of education: Not on file    Highest education level: Not on file   Occupational History    Not on file   Tobacco Use    Smoking status: Never    Smokeless tobacco: Never   Vaping Use    Vaping status: Never Used   Substance and Sexual Activity    Alcohol use: Not Currently    Drug use: Not Currently     Comment: social    Sexual activity: Not Currently   Other Topics Concern    Not on file   Social History Narrative    ** Merged History Encounter **          Social Determinants of Health     Financial Resource Strain: Not on file   Food Insecurity: No Food Insecurity (11/8/2021)    Hunger Vital Sign     Worried About Running Out of Food in the Last Year: Never true     Ran Out of Food in the Last Year: Never true   Transportation Needs: No Transportation Needs (11/8/2021)    PRAPARE - Transportation     Lack of Transportation (Medical): No     Lack of Transportation (Non-Medical): No   Physical Activity: Not on file   Stress: Not on file   Social Connections: Not on file   Intimate Partner Violence: Not on file   Housing Stability: Not on file       Current Medications  Current Outpatient Medications   Medication Sig Dispense Refill    albuterol (PROVENTIL HFA,VENTOLIN HFA) 90 mcg/act inhaler INHALE 2 PUFFS EVERY 6 HOURS AS NEEDED FOR WHEEZING 6.7 g 0    Ascorbic Acid (Vitamin C) 500 MG CAPS Take 1 each by mouth daily      ECHINACEA PO Take by mouth daily      ELDERBERRY PO Take 1 tablet by mouth daily as needed Daily every morning      famotidine (PEPCID) 20 mg tablet TAKE 1 TABLET BY MOUTH TWICE A  tablet 1    fluticasone (FLONASE) 50 mcg/act nasal spray SPRAY 1 SPRAY INTO EACH NOSTRIL EVERY DAY 24 mL 3    gabapentin (Neurontin) 100  "mg capsule Take one tablet by mouth at night time for one week. Then increase to 200 mg at night time for one week. Then increase to 300 mg at night. 90 capsule 2    Lidocaine 4 % PTCH Apply 1 patch topically in the morning 30 patch 2    ondansetron (ZOFRAN) 4 mg tablet Take 1 tablet (4 mg total) by mouth every 8 (eight) hours as needed for nausea or vomiting 60 tablet 3    ondansetron (ZOFRAN) 4 mg tablet Take 1 tablet (4 mg total) by mouth every 8 (eight) hours as needed for nausea or vomiting 30 tablet 3    rimegepant sulfate (Nurtec) 75 mg TBDP Take one NURTEC 75 mg at onset under tongue. Limit 1 in 24 hours. 8 a month. 8 tablet 3    Synthroid 75 MCG tablet Take 75 mcg by mouth daily Except sundays      topiramate (TOPAMAX) 200 MG tablet TAKE 1 TABLET BY MOUTH TWICE A  tablet 3    Vitamin D, Cholecalciferol, 25 MCG (1000 UT) CAPS Take 1 tablet by mouth in the morning      Zinc 50 MG CAPS Take 1 capsule by mouth daily       No current facility-administered medications for this visit.       Allergies  Allergies   Allergen Reactions    Morphine Other (See Comments)    Codeine Hives    Morphine And Related     Codeine Itching    Sulfa Antibiotics Rash       OBJECTIVE    Vitals   Vitals:    02/06/24 1255   Weight: 49.9 kg (110 lb)   Height: 5' 2\" (1.575 m)       PVR:    Physical Exam  Constitutional:       General: She is not in acute distress.     Appearance: Normal appearance. She is normal weight. She is not ill-appearing or toxic-appearing.   HENT:      Head: Normocephalic and atraumatic.   Eyes:      Conjunctiva/sclera: Conjunctivae normal.   Cardiovascular:      Rate and Rhythm: Normal rate.   Pulmonary:      Effort: Pulmonary effort is normal. No respiratory distress.   Skin:     General: Skin is warm and dry.   Neurological:      General: No focal deficit present.      Mental Status: She is alert and oriented to person, place, and time.      Cranial Nerves: No cranial nerve deficit.   Psychiatric:    "      Mood and Affect: Mood normal.         Behavior: Behavior normal.         Thought Content: Thought content normal.          Labs:     Lab Results   Component Value Date    CREATININE 0.77 12/17/2023      Lab Results   Component Value Date    HGBA1C 4.7 06/23/2021     Lab Results   Component Value Date    CALCIUM 9.6 12/17/2023    K 3.8 12/17/2023    CO2 23 12/17/2023     12/17/2023    BUN 12 12/17/2023    CREATININE 0.77 12/17/2023       I have personally reviewed all pertinent lab results and reviewed with patient    Imaging   History: Lower abdominal pain. Evaluate for diverticulitis.      Exam: CT of the abdomen and pelvis with IV contrast.      Technique: Using helical technique, axial images were obtained through the   abdomen and pelvis following administration of 80 cc of Omnipaque 350   intravenous contrast. Coronal and sagittal reformations were performed.      Comparison: Prior examination dated 9/10/2022.       Abdomen:   Lung Bases: Partially visualized breast implants. Stable minimal pericardial   effusion.     Liver: Normal.     Gallbladder/Bile ducts: Normal.     Spleen: Normal.     Pancreas: Normal.     Adrenal glands: Normal.     Kidneys/Ureters: There are a few scattered predominantly subcentimeter hypodense   renal lesions, probable cysts largest lesion at the lower pole of the left   kidney measures 1 cm in size.     Bowel/Mesentery: No evidence of bowel obstruction or ileus. No significant   colonic diverticula or inflammatory changes to suggest acute diverticulitis.     Lymph nodes: Normal.     Vessels: Normal.     Abdominal Wall: Normal.      Pelvis:   Status post hysterectomy. No mass or free fluid.     Urinary Bladder: Normal.     Lymph nodes:  Normal.      Bones: Normal.     Mulugeta Bustillos PA-C  Date: 2/6/2024 Time: 1:03 PM  [facility]    This note was written using fluency dictation software. Please excuse any resulting minor grammatical errors.

## 2024-02-12 ENCOUNTER — HOSPITAL ENCOUNTER (OUTPATIENT)
Dept: RADIOLOGY | Facility: HOSPITAL | Age: 64
Discharge: HOME | End: 2024-02-12
Attending: INTERNAL MEDICINE
Payer: MEDICARE

## 2024-02-12 DIAGNOSIS — Z78.0 POST-MENOPAUSAL: ICD-10-CM

## 2024-02-12 PROCEDURE — 77080 DXA BONE DENSITY AXIAL: CPT | Mod: 26,,, | Performed by: RADIOLOGY

## 2024-02-12 PROCEDURE — 77080 DXA BONE DENSITY AXIAL: CPT | Mod: TC

## 2024-03-18 RX ORDER — ALBUTEROL SULFATE 90 UG/1
2 AEROSOL, METERED RESPIRATORY (INHALATION) 2 TIMES DAILY
Qty: 18 G | Refills: 6 | Status: SHIPPED | OUTPATIENT
Start: 2024-03-18

## 2024-03-18 RX ORDER — ATORVASTATIN CALCIUM 40 MG/1
40 TABLET, FILM COATED ORAL DAILY
Qty: 90 TABLET | Refills: 1 | Status: SHIPPED | OUTPATIENT
Start: 2024-03-18

## 2024-03-18 RX ORDER — PANTOPRAZOLE SODIUM 40 MG/1
40 TABLET, DELAYED RELEASE ORAL DAILY
Qty: 90 TABLET | Refills: 1 | Status: SHIPPED | OUTPATIENT
Start: 2024-03-18

## 2024-03-18 RX ORDER — AMLODIPINE BESYLATE 10 MG/1
10 TABLET ORAL DAILY
Qty: 90 TABLET | Refills: 1 | Status: SHIPPED | OUTPATIENT
Start: 2024-03-18

## 2024-05-23 ENCOUNTER — OFFICE VISIT (OUTPATIENT)
Dept: FAMILY MEDICINE | Facility: CLINIC | Age: 64
End: 2024-05-23
Payer: MEDICARE

## 2024-05-23 VITALS
DIASTOLIC BLOOD PRESSURE: 81 MMHG | HEIGHT: 59 IN | RESPIRATION RATE: 17 BRPM | TEMPERATURE: 98 F | HEART RATE: 85 BPM | SYSTOLIC BLOOD PRESSURE: 112 MMHG | OXYGEN SATURATION: 95 % | WEIGHT: 152 LBS | BODY MASS INDEX: 30.64 KG/M2

## 2024-05-23 DIAGNOSIS — M81.0 OSTEOPOROSIS, UNSPECIFIED OSTEOPOROSIS TYPE, UNSPECIFIED PATHOLOGICAL FRACTURE PRESENCE: Primary | ICD-10-CM

## 2024-05-23 PROCEDURE — 1101F PT FALLS ASSESS-DOCD LE1/YR: CPT | Mod: ,,, | Performed by: INTERNAL MEDICINE

## 2024-05-23 PROCEDURE — 1126F AMNT PAIN NOTED NONE PRSNT: CPT | Mod: ,,, | Performed by: INTERNAL MEDICINE

## 2024-05-23 PROCEDURE — 1159F MED LIST DOCD IN RCRD: CPT | Mod: ,,, | Performed by: INTERNAL MEDICINE

## 2024-05-23 PROCEDURE — 3079F DIAST BP 80-89 MM HG: CPT | Mod: ,,, | Performed by: INTERNAL MEDICINE

## 2024-05-23 PROCEDURE — 3074F SYST BP LT 130 MM HG: CPT | Mod: ,,, | Performed by: INTERNAL MEDICINE

## 2024-05-23 PROCEDURE — 99213 OFFICE O/P EST LOW 20 MIN: CPT | Mod: ,,, | Performed by: INTERNAL MEDICINE

## 2024-05-23 PROCEDURE — 3288F FALL RISK ASSESSMENT DOCD: CPT | Mod: ,,, | Performed by: INTERNAL MEDICINE

## 2024-05-23 PROCEDURE — 3008F BODY MASS INDEX DOCD: CPT | Mod: ,,, | Performed by: INTERNAL MEDICINE

## 2024-05-23 PROCEDURE — 3044F HG A1C LEVEL LT 7.0%: CPT | Mod: ,,, | Performed by: INTERNAL MEDICINE

## 2024-05-23 RX ORDER — ACETAMINOPHEN 500 MG
5000 TABLET ORAL
Qty: 90 TABLET | Refills: 1 | Status: SHIPPED | OUTPATIENT
Start: 2024-05-24

## 2024-05-28 PROBLEM — M81.0 OSTEOPOROSIS: Status: ACTIVE | Noted: 2024-05-28

## 2024-05-28 NOTE — PROGRESS NOTES
Subjective:       Patient ID: Faviola King is a 63 y.o. female.    Chief Complaint: Follow-up    HPI  .  Patient presents with a diagnosis of osteoporosis shown a bone scan.  Patient also takes calcium I will add 5    Current Medications:    Current Outpatient Medications:     albuterol (VENTOLIN HFA) 90 mcg/actuation inhaler, Inhale 2 puffs into the lungs 2 (two) times a day. Rescue, Disp: 18 g, Rfl: 6    amLODIPine (NORVASC) 10 MG tablet, Take 1 tablet (10 mg total) by mouth once daily., Disp: 90 tablet, Rfl: 1    atorvastatin (LIPITOR) 40 MG tablet, Take 1 tablet (40 mg total) by mouth once daily., Disp: 90 tablet, Rfl: 1    carvediloL (COREG) 6.25 MG tablet, Take 1 tablet by mouth twice daily, Disp: 180 tablet, Rfl: 0    methocarbamoL (ROBAXIN) 500 MG Tab, Take 500 mg by mouth every 6 (six) hours as needed., Disp: , Rfl:     oxyCODONE (ROXICODONE) 5 MG immediate release tablet, Take 5 mg by mouth every 6 (six) hours as needed., Disp: , Rfl:     pantoprazole (PROTONIX) 40 MG tablet, Take 1 tablet (40 mg total) by mouth once daily., Disp: 90 tablet, Rfl: 1    potassium chloride SA (K-DUR,KLOR-CON) 20 MEQ tablet, Take 1 tablet (20 mEq total) by mouth 2 (two) times daily., Disp: 4 tablet, Rfl: 0    traZODone (DESYREL) 50 MG tablet, Take 25 mg by mouth nightly as needed., Disp: , Rfl:     ALPRAZolam (XANAX) 0.25 MG tablet, Take 1 tablet (0.25 mg total) by mouth nightly as needed for Anxiety. (Patient not taking: Reported on 7/18/2023), Disp: 30 tablet, Rfl: 1    cholecalciferol, vitamin D3, (VITAMIN D3) 125 mcg (5,000 unit) Tab, Take 1 tablet (5,000 Units total) by mouth 3 (three) times a week., Disp: 90 tablet, Rfl: 1    nicotine (NICODERM CQ) 14 mg/24 hr, Place 1 patch onto the skin once daily. (Patient not taking: Reported on 2/27/2023), Disp: 30 patch, Rfl: 1    ondansetron (ZOFRAN-ODT) 4 MG TbDL, Take 2 tablets (8 mg total) by mouth 2 (two) times daily. (Patient not taking: Reported on 1/26/2023), Disp: 20 tablet,  "Rfl: 0           ROS  Twelve point system reviewed, unremarkable except for stated above in HPI.        Objective:         Vitals:    05/23/24 1355   BP: 112/81   BP Location: Right arm   Patient Position: Sitting   BP Method: Large (Automatic)   Pulse: 85   Resp: 17   Temp: 97.8 °F (36.6 °C)   TempSrc: Temporal   SpO2: 95%   Weight: 68.9 kg (152 lb)   Height: 4' 11" (1.499 m)        Physical Exam     Patient is awake alert oriented person place and  Lungs are clear to auscultation bilaterally no crackles or wheezes   Cardiovascular S1-S2 regular rate and rhythm no murmurs rubs or gallops   Abdomen is soft positive bowel sounds nontender, extremities no clubbing cyanosis edema  Neuro no focal neurological deficits  Skin warm and dry.     Last Labs:     No visits with results within 1 Month(s) from this visit.   Latest known visit with results is:   Office Visit on 01/22/2024   Component Date Value    Sodium 01/22/2024 140     Potassium 01/22/2024 3.4 (L)     Chloride 01/22/2024 108 (H)     CO2 01/22/2024 27     Anion Gap 01/22/2024 8     Glucose 01/22/2024 89     BUN 01/22/2024 8     Creatinine 01/22/2024 0.75     BUN/Creatinine Ratio 01/22/2024 11     Calcium 01/22/2024 10.0     eGFR 01/22/2024 88     Hemoglobin A1C 01/22/2024 5.6     Estimated Average Glucose 01/22/2024 114        Last Imaging:  DXA Bone Density Axial Skeleton 1 or more sites  Narrative: EXAMINATION:  DXA BONE DENSITY AXIAL SKELETON 1 OR MORE SITES    CLINICAL HISTORY:  Asymptomatic menopausal state    TECHNIQUE:  Bone densitometry performed    COMPARISON:  None available    FINDINGS:  Vertebral body T score measurements are estimated at -3.5    Femoral neck T score measurements were estimated at -2.3    Estimated 10 year fracture rate 11%  Impression: Findings fall within the osteoporosis range  high fracture risk.    Electronically signed by: Michael Pedraza  Date:    02/12/2024  Time:    10:40         **Labs and x-rays personally reviewed by " me    ** reviewed           Assessment & Plan:       1. Osteoporosis, unspecified osteoporosis type, unspecified pathological fracture presence    Other orders  -     cholecalciferol, vitamin D3, (VITAMIN D3) 125 mcg (5,000 unit) Tab; Take 1 tablet (5,000 Units total) by mouth 3 (three) times a week.  Dispense: 90 tablet; Refill: 1            Shantanu Larson MD

## 2024-07-09 DIAGNOSIS — Z71.89 COMPLEX CARE COORDINATION: ICD-10-CM

## 2024-08-20 ENCOUNTER — OFFICE VISIT (OUTPATIENT)
Dept: FAMILY MEDICINE | Facility: CLINIC | Age: 64
End: 2024-08-20
Payer: MEDICARE

## 2024-08-20 VITALS
BODY MASS INDEX: 30.84 KG/M2 | TEMPERATURE: 98 F | HEIGHT: 59 IN | WEIGHT: 153 LBS | HEART RATE: 88 BPM | RESPIRATION RATE: 18 BRPM | OXYGEN SATURATION: 99 % | DIASTOLIC BLOOD PRESSURE: 83 MMHG | SYSTOLIC BLOOD PRESSURE: 117 MMHG

## 2024-08-20 DIAGNOSIS — I10 ESSENTIAL (PRIMARY) HYPERTENSION: ICD-10-CM

## 2024-08-20 DIAGNOSIS — K21.9 GASTROESOPHAGEAL REFLUX DISEASE, UNSPECIFIED WHETHER ESOPHAGITIS PRESENT: ICD-10-CM

## 2024-08-20 DIAGNOSIS — I25.2 HISTORY OF MI (MYOCARDIAL INFARCTION): Primary | ICD-10-CM

## 2024-08-20 PROCEDURE — 3074F SYST BP LT 130 MM HG: CPT | Mod: ,,, | Performed by: INTERNAL MEDICINE

## 2024-08-20 PROCEDURE — 3079F DIAST BP 80-89 MM HG: CPT | Mod: ,,, | Performed by: INTERNAL MEDICINE

## 2024-08-20 PROCEDURE — 1101F PT FALLS ASSESS-DOCD LE1/YR: CPT | Mod: ,,, | Performed by: INTERNAL MEDICINE

## 2024-08-20 PROCEDURE — 3288F FALL RISK ASSESSMENT DOCD: CPT | Mod: ,,, | Performed by: INTERNAL MEDICINE

## 2024-08-20 PROCEDURE — 99214 OFFICE O/P EST MOD 30 MIN: CPT | Mod: ,,, | Performed by: INTERNAL MEDICINE

## 2024-08-20 PROCEDURE — 3008F BODY MASS INDEX DOCD: CPT | Mod: ,,, | Performed by: INTERNAL MEDICINE

## 2024-08-20 PROCEDURE — 1126F AMNT PAIN NOTED NONE PRSNT: CPT | Mod: ,,, | Performed by: INTERNAL MEDICINE

## 2024-08-20 PROCEDURE — 3044F HG A1C LEVEL LT 7.0%: CPT | Mod: ,,, | Performed by: INTERNAL MEDICINE

## 2024-08-20 PROCEDURE — 1159F MED LIST DOCD IN RCRD: CPT | Mod: ,,, | Performed by: INTERNAL MEDICINE

## 2024-08-20 RX ORDER — PANTOPRAZOLE SODIUM 40 MG/1
40 TABLET, DELAYED RELEASE ORAL DAILY
Qty: 90 TABLET | Refills: 1 | Status: SHIPPED | OUTPATIENT
Start: 2024-08-20

## 2024-08-20 RX ORDER — CARVEDILOL 6.25 MG/1
6.25 TABLET ORAL 2 TIMES DAILY
Qty: 180 TABLET | Refills: 0 | Status: SHIPPED | OUTPATIENT
Start: 2024-08-20

## 2024-08-20 RX ORDER — AMLODIPINE BESYLATE 10 MG/1
10 TABLET ORAL DAILY
Qty: 90 TABLET | Refills: 1 | Status: SHIPPED | OUTPATIENT
Start: 2024-08-20

## 2024-08-21 PROBLEM — I25.2 HISTORY OF MI (MYOCARDIAL INFARCTION): Status: ACTIVE | Noted: 2024-08-21

## 2024-08-21 NOTE — PROGRESS NOTES
Subjective:       Patient ID: Faviola King is a 64 y.o. female.    Chief Complaint: Osteoporosis and Health Maintenance (Pt refused care gaps during visit. )    HPI  .  Patient presents with a history of myocardial infarction does need to see a cardiologist also the patient has evidence of hypertension and GERD.  Patient wants dental clearance before she has dental clearance she wants to make sure that heart is stable.  I believe the patient wants solid teeth removed so she can get dentures the recent chest pain but she had seen a cardiologist quite some time.    Current Medications:    Current Outpatient Medications:     albuterol (VENTOLIN HFA) 90 mcg/actuation inhaler, Inhale 2 puffs into the lungs 2 (two) times a day. Rescue, Disp: 18 g, Rfl: 6    ALPRAZolam (XANAX) 0.25 MG tablet, Take 1 tablet (0.25 mg total) by mouth nightly as needed for Anxiety., Disp: 30 tablet, Rfl: 1    atorvastatin (LIPITOR) 40 MG tablet, Take 1 tablet (40 mg total) by mouth once daily., Disp: 90 tablet, Rfl: 1    cholecalciferol, vitamin D3, (VITAMIN D3) 125 mcg (5,000 unit) Tab, Take 1 tablet (5,000 Units total) by mouth 3 (three) times a week., Disp: 90 tablet, Rfl: 1    methocarbamoL (ROBAXIN) 500 MG Tab, Take 500 mg by mouth every 6 (six) hours as needed., Disp: , Rfl:     oxyCODONE (ROXICODONE) 5 MG immediate release tablet, Take 5 mg by mouth every 6 (six) hours as needed., Disp: , Rfl:     potassium chloride SA (K-DUR,KLOR-CON) 20 MEQ tablet, Take 1 tablet (20 mEq total) by mouth 2 (two) times daily., Disp: 4 tablet, Rfl: 0    traZODone (DESYREL) 50 MG tablet, Take 25 mg by mouth nightly as needed., Disp: , Rfl:     amLODIPine (NORVASC) 10 MG tablet, Take 1 tablet (10 mg total) by mouth once daily., Disp: 90 tablet, Rfl: 1    carvediloL (COREG) 6.25 MG tablet, Take 1 tablet (6.25 mg total) by mouth 2 (two) times daily., Disp: 180 tablet, Rfl: 0    nicotine (NICODERM CQ) 14 mg/24 hr, Place 1 patch onto the skin once daily. (Patient  "not taking: Reported on 2/27/2023), Disp: 30 patch, Rfl: 1    ondansetron (ZOFRAN-ODT) 4 MG TbDL, Take 2 tablets (8 mg total) by mouth 2 (two) times daily. (Patient not taking: Reported on 1/26/2023), Disp: 20 tablet, Rfl: 0    pantoprazole (PROTONIX) 40 MG tablet, Take 1 tablet (40 mg total) by mouth once daily., Disp: 90 tablet, Rfl: 1           ROS  Twelve point system reviewed, unremarkable except for stated above in HPI.        Objective:         Vitals:    08/20/24 1317   BP: 117/83   BP Location: Left arm   Patient Position: Sitting   BP Method: X-Large (Automatic)   Pulse: 88   Resp: 18   Temp: 97.8 °F (36.6 °C)   TempSrc: Temporal   SpO2: 99%   Weight: 69.4 kg (153 lb)   Height: 4' 11" (1.499 m)        Physical Exam     Patient is awake alert oriented person place and  Lungs are clear to auscultation bilaterally no crackles or wheezes   Cardiovascular S1-S2 regular rate and rhythm no murmurs rubs or gallops   Abdomen is soft positive bowel sounds nontender, extremities no clubbing cyanosis edema  Neuro no focal neurological deficits  Skin warm and dry.     Last Labs:     No visits with results within 1 Month(s) from this visit.   Latest known visit with results is:   Office Visit on 01/22/2024   Component Date Value    Sodium 01/22/2024 140     Potassium 01/22/2024 3.4 (L)     Chloride 01/22/2024 108 (H)     CO2 01/22/2024 27     Anion Gap 01/22/2024 8     Glucose 01/22/2024 89     BUN 01/22/2024 8     Creatinine 01/22/2024 0.75     BUN/Creatinine Ratio 01/22/2024 11     Calcium 01/22/2024 10.0     eGFR 01/22/2024 88     Hemoglobin A1C 01/22/2024 5.6     Estimated Average Glucose 01/22/2024 114        Last Imaging:  DXA Bone Density Axial Skeleton 1 or more sites  Narrative: EXAMINATION:  DXA BONE DENSITY AXIAL SKELETON 1 OR MORE SITES    CLINICAL HISTORY:  Asymptomatic menopausal state    TECHNIQUE:  Bone densitometry performed    COMPARISON:  None available    FINDINGS:  Vertebral body T score " measurements are estimated at -3.5    Femoral neck T score measurements were estimated at -2.3    Estimated 10 year fracture rate 11%  Impression: Findings fall within the osteoporosis range  high fracture risk.    Electronically signed by: Michael Pedraza  Date:    02/12/2024  Time:    10:40         **Labs and x-rays personally reviewed by me    ** reviewed           Assessment & Plan:       1. History of MI (myocardial infarction)  -     Ambulatory referral/consult to Cardiology; Future; Expected date: 08/27/2024    2. Essential (primary) hypertension    3. Gastroesophageal reflux disease, unspecified whether esophagitis present    Other orders  -     amLODIPine (NORVASC) 10 MG tablet; Take 1 tablet (10 mg total) by mouth once daily.  Dispense: 90 tablet; Refill: 1  -     carvediloL (COREG) 6.25 MG tablet; Take 1 tablet (6.25 mg total) by mouth 2 (two) times daily.  Dispense: 180 tablet; Refill: 0  -     pantoprazole (PROTONIX) 40 MG tablet; Take 1 tablet (40 mg total) by mouth once daily.  Dispense: 90 tablet; Refill: 1            Shantanu Larson MD

## 2024-08-22 ENCOUNTER — OFFICE VISIT (OUTPATIENT)
Dept: CARDIOLOGY | Facility: CLINIC | Age: 64
End: 2024-08-22
Payer: MEDICAID

## 2024-08-22 VITALS
DIASTOLIC BLOOD PRESSURE: 90 MMHG | OXYGEN SATURATION: 98 % | HEIGHT: 59 IN | BODY MASS INDEX: 31.04 KG/M2 | HEART RATE: 80 BPM | WEIGHT: 154 LBS | SYSTOLIC BLOOD PRESSURE: 132 MMHG

## 2024-08-22 DIAGNOSIS — I10 ESSENTIAL (PRIMARY) HYPERTENSION: ICD-10-CM

## 2024-08-22 DIAGNOSIS — I25.2 HISTORY OF MI (MYOCARDIAL INFARCTION): ICD-10-CM

## 2024-08-22 DIAGNOSIS — I50.22 CHRONIC SYSTOLIC HEART FAILURE: Primary | ICD-10-CM

## 2024-08-22 PROCEDURE — 99214 OFFICE O/P EST MOD 30 MIN: CPT | Mod: S$PBB,,, | Performed by: STUDENT IN AN ORGANIZED HEALTH CARE EDUCATION/TRAINING PROGRAM

## 2024-08-22 PROCEDURE — 99999 PR PBB SHADOW E&M-EST. PATIENT-LVL V: CPT | Mod: PBBFAC,,, | Performed by: STUDENT IN AN ORGANIZED HEALTH CARE EDUCATION/TRAINING PROGRAM

## 2024-08-22 PROCEDURE — 99215 OFFICE O/P EST HI 40 MIN: CPT | Mod: PBBFAC | Performed by: STUDENT IN AN ORGANIZED HEALTH CARE EDUCATION/TRAINING PROGRAM

## 2024-08-22 NOTE — PROGRESS NOTES
PCP: Shantanu Larson MD    Referring Provider:     Subjective:   Faviola King is a 64 y.o. female  with a history of systolic heart failure  secondary to stress-induced cardiomyopathy who presents for follow-up.    8/22/24:  patient was lost to follow up over the last year.  Referred back to establish care with Cardiology. Doing well.  Underwent lung surgery without any complications.  No cardiac complaints. Denies chest pain/tightness, dyspnea, palpitations, lightheadedness or syncope.     7/18/23: Presents for new patient visit for evaluation of abnormal EKG. Last office note from previous EMR reviewed.  Last seen by Dr. Lloyd on 6/12/2019.  She has a history of systolic heart failure LVEF 35% secondary to stress-induced cardiomyopathy.  Left heart catheterization in September 2018 showed nonobstructive CAD.  It appears that the patient did not follow up with Cardiology after.  She is doing well.  Denies chest pain, shortness of breath, orthopnea, leg swelling, palpitations, lightheadedness or syncopal episodes.  She is active at baseline.  She works at a [employer].  She is easily able to achieve 4 Mets on a daily basis without any anginal or anginal equivalent symptoms.  Of note, she was recently diagnosed with lung cancer and assess scheduled to undergo lung surgery at [facility] this coming Friday    Fhx:  Mother (heart failure, hypertension), father (hypertension)  Shx:  40 pack-year smoking history. Quit in 2023 after lung cancer diagnosis.    EKG 7/18/23: Normal sinus rhythm, anterior infarct (age undetermined)  ECHO No results found for this or any previous visit.     CATH: No results found for this or any previous visit.     Lab Results   Component Value Date     01/22/2024    K 3.4 (L) 01/22/2024     (H) 01/22/2024    CO2 27 01/22/2024    BUN 8 01/22/2024    CREATININE 0.75 01/22/2024    CALCIUM 10.0 01/22/2024    ANIONGAP 8 01/22/2024    EGFRNONAA 72 07/03/2022       Lab Results    Component Value Date    CHOL 198 05/24/2022     Lab Results   Component Value Date    HDL 41 05/24/2022     Lab Results   Component Value Date    LDLCALC 109 05/24/2022     Lab Results   Component Value Date    TRIG 240 (H) 05/24/2022     Lab Results   Component Value Date    CHOLHDL 4.8 05/24/2022       Lab Results   Component Value Date    WBC 5.67 09/01/2023    HGB 13.4 09/01/2023    HCT 39.6 09/01/2023    MCV 85.0 09/01/2023     09/01/2023           Current Outpatient Medications:     albuterol (VENTOLIN HFA) 90 mcg/actuation inhaler, Inhale 2 puffs into the lungs 2 (two) times a day. Rescue, Disp: 18 g, Rfl: 6    amLODIPine (NORVASC) 10 MG tablet, Take 1 tablet (10 mg total) by mouth once daily., Disp: 90 tablet, Rfl: 1    carvediloL (COREG) 6.25 MG tablet, Take 1 tablet (6.25 mg total) by mouth 2 (two) times daily., Disp: 180 tablet, Rfl: 0    pantoprazole (PROTONIX) 40 MG tablet, Take 1 tablet (40 mg total) by mouth once daily., Disp: 90 tablet, Rfl: 1    potassium chloride SA (K-DUR,KLOR-CON) 20 MEQ tablet, Take 1 tablet (20 mEq total) by mouth 2 (two) times daily. (Patient taking differently: Take 20 mEq by mouth 3 (three) times daily.), Disp: 4 tablet, Rfl: 0    ALPRAZolam (XANAX) 0.25 MG tablet, Take 1 tablet (0.25 mg total) by mouth nightly as needed for Anxiety., Disp: 30 tablet, Rfl: 1    atorvastatin (LIPITOR) 40 MG tablet, Take 1 tablet (40 mg total) by mouth once daily. (Patient not taking: Reported on 8/22/2024), Disp: 90 tablet, Rfl: 1    cholecalciferol, vitamin D3, (VITAMIN D3) 125 mcg (5,000 unit) Tab, Take 1 tablet (5,000 Units total) by mouth 3 (three) times a week., Disp: 90 tablet, Rfl: 1    methocarbamoL (ROBAXIN) 500 MG Tab, Take 500 mg by mouth every 6 (six) hours as needed., Disp: , Rfl:     nicotine (NICODERM CQ) 14 mg/24 hr, Place 1 patch onto the skin once daily. (Patient not taking: Reported on 2/27/2023), Disp: 30 patch, Rfl: 1    ondansetron (ZOFRAN-ODT) 4 MG TbDL, Take 2  "tablets (8 mg total) by mouth 2 (two) times daily. (Patient not taking: Reported on 1/26/2023), Disp: 20 tablet, Rfl: 0    oxyCODONE (ROXICODONE) 5 MG immediate release tablet, Take 5 mg by mouth every 6 (six) hours as needed., Disp: , Rfl:     traZODone (DESYREL) 50 MG tablet, Take 25 mg by mouth nightly as needed., Disp: , Rfl:     Review of Systems   Constitutional:  Negative for chills, diaphoresis, fever and malaise/fatigue.   Respiratory:  Negative for cough and shortness of breath.    Cardiovascular:  Negative for chest pain, palpitations, orthopnea, claudication, leg swelling and PND.   Gastrointestinal:  Negative for abdominal pain, heartburn, nausea and vomiting.   Neurological:  Negative for dizziness.       Objective:   BP (!) 132/90 (BP Location: Left arm, Patient Position: Sitting) Comment: states she has not taken BP RX today  Pulse 80   Ht 4' 11" (1.499 m)   Wt 69.9 kg (154 lb)   SpO2 98%   BMI 31.10 kg/m²     Physical Exam  Constitutional:       General: She is not in acute distress.     Appearance: Normal appearance.   Cardiovascular:      Rate and Rhythm: Normal rate and regular rhythm.      Pulses: Normal pulses.      Heart sounds: Normal heart sounds. No murmur heard.     No friction rub. No gallop.   Pulmonary:      Effort: Pulmonary effort is normal.      Breath sounds: Normal breath sounds. No wheezing or rales.   Musculoskeletal:      Right lower leg: No edema.      Left lower leg: No edema.   Skin:     General: Skin is warm and dry.   Neurological:      Mental Status: She is alert.           Assessment:     1. Chronic systolic heart failure  EKG 12-lead    Echo      2. Essential (primary) hypertension        3. History of MI (myocardial infarction)  Ambulatory referral/consult to Cardiology            Plan:   No problem-specific Assessment & Plan notes found for this encounter.      Hx of systolic heart failure   LVEF 35% in 2018 due to stress-induced cardiomyopathy  NYHA class " symptoms currently, euvolemic on exam  No repeat assessment of LV function noted on file   Repeat echo for re-assessment of LVEF. GDMT to be adjusted based on LVEF    Follow-up in 3 months or sooner if echo is abnormal

## 2024-08-26 ENCOUNTER — HOSPITAL ENCOUNTER (INPATIENT)
Facility: HOSPITAL | Age: 64
LOS: 1 days | Discharge: HOME | DRG: 419 | End: 2024-08-29
Attending: FAMILY MEDICINE | Admitting: SURGERY
Payer: MEDICARE

## 2024-08-26 DIAGNOSIS — K80.00 CALCULUS OF GALLBLADDER WITH ACUTE CHOLECYSTITIS WITHOUT OBSTRUCTION: ICD-10-CM

## 2024-08-26 DIAGNOSIS — K80.20 CHOLELITHIASIS: ICD-10-CM

## 2024-08-26 DIAGNOSIS — R10.13 EPIGASTRIC PAIN: Primary | ICD-10-CM

## 2024-08-26 LAB
ALBUMIN SERPL BCP-MCNC: 4.2 G/DL (ref 3.5–5)
ALBUMIN/GLOB SERPL: 0.9 {RATIO}
ALP SERPL-CCNC: 81 U/L (ref 55–142)
ALT SERPL W P-5'-P-CCNC: 20 U/L (ref 13–56)
ANION GAP SERPL CALCULATED.3IONS-SCNC: 9 MMOL/L (ref 7–16)
AST SERPL W P-5'-P-CCNC: 23 U/L (ref 15–37)
BACTERIA #/AREA URNS HPF: ABNORMAL /HPF
BASOPHILS # BLD AUTO: 0.01 K/UL (ref 0–0.2)
BASOPHILS NFR BLD AUTO: 0.1 % (ref 0–1)
BILIRUB SERPL-MCNC: 0.4 MG/DL (ref ?–1.2)
BILIRUB UR QL STRIP: NEGATIVE
BUN SERPL-MCNC: 10 MG/DL (ref 7–18)
BUN/CREAT SERPL: 14 (ref 6–20)
CALCIUM SERPL-MCNC: 9.9 MG/DL (ref 8.5–10.1)
CHLORIDE SERPL-SCNC: 105 MMOL/L (ref 98–107)
CLARITY UR: CLEAR
CO2 SERPL-SCNC: 25 MMOL/L (ref 21–32)
COLOR UR: COLORLESS
CREAT SERPL-MCNC: 0.72 MG/DL (ref 0.55–1.02)
DIFFERENTIAL METHOD BLD: ABNORMAL
EGFR (NO RACE VARIABLE) (RUSH/TITUS): 92 ML/MIN/1.73M2
EOSINOPHIL # BLD AUTO: 0.01 K/UL (ref 0–0.5)
EOSINOPHIL NFR BLD AUTO: 0.1 % (ref 1–4)
ERYTHROCYTE [DISTWIDTH] IN BLOOD BY AUTOMATED COUNT: 14.2 % (ref 11.5–14.5)
EST. AVERAGE GLUCOSE BLD GHB EST-MCNC: 114 MG/DL
GLOBULIN SER-MCNC: 4.5 G/DL (ref 2–4)
GLUCOSE SERPL-MCNC: 132 MG/DL (ref 74–106)
GLUCOSE UR STRIP-MCNC: NORMAL MG/DL
HBA1C MFR BLD HPLC: 5.6 % (ref 4.5–6.6)
HCT VFR BLD AUTO: 46.4 % (ref 38–47)
HGB BLD-MCNC: 14.8 G/DL (ref 12–16)
IMM GRANULOCYTES # BLD AUTO: 0.04 K/UL (ref 0–0.04)
IMM GRANULOCYTES NFR BLD: 0.5 % (ref 0–0.4)
KETONES UR STRIP-SCNC: ABNORMAL MG/DL
LEUKOCYTE ESTERASE UR QL STRIP: NEGATIVE
LIPASE SERPL-CCNC: 23 U/L (ref 16–77)
LYMPHOCYTES # BLD AUTO: 1.39 K/UL (ref 1–4.8)
LYMPHOCYTES NFR BLD AUTO: 16.8 % (ref 27–41)
MCH RBC QN AUTO: 28.7 PG (ref 27–31)
MCHC RBC AUTO-ENTMCNC: 31.9 G/DL (ref 32–36)
MCV RBC AUTO: 90.1 FL (ref 80–96)
MONOCYTES # BLD AUTO: 0.28 K/UL (ref 0–0.8)
MONOCYTES NFR BLD AUTO: 3.4 % (ref 2–6)
MPC BLD CALC-MCNC: 9.7 FL (ref 9.4–12.4)
MUCOUS, UA: ABNORMAL /LPF
NEUTROPHILS # BLD AUTO: 6.56 K/UL (ref 1.8–7.7)
NEUTROPHILS NFR BLD AUTO: 79.1 % (ref 53–65)
NITRITE UR QL STRIP: NEGATIVE
NRBC # BLD AUTO: 0 X10E3/UL
NRBC, AUTO (.00): 0 %
PH UR STRIP: 8 PH UNITS
PLATELET # BLD AUTO: 295 K/UL (ref 150–400)
POTASSIUM SERPL-SCNC: 2.9 MMOL/L (ref 3.5–5.1)
PROT SERPL-MCNC: 8.7 G/DL (ref 6.4–8.2)
PROT UR QL STRIP: 30
RBC # BLD AUTO: 5.15 M/UL (ref 4.2–5.4)
RBC # UR STRIP: NEGATIVE /UL
RBC #/AREA URNS HPF: 5 /HPF
SARS-COV-2 RDRP RESP QL NAA+PROBE: NEGATIVE
SODIUM SERPL-SCNC: 136 MMOL/L (ref 136–145)
SP GR UR STRIP: 1.01
SQUAMOUS #/AREA URNS LPF: ABNORMAL /HPF
UROBILINOGEN UR STRIP-ACNC: NORMAL MG/DL
WBC # BLD AUTO: 8.29 K/UL (ref 4.5–11)
WBC #/AREA URNS HPF: 1 /HPF

## 2024-08-26 PROCEDURE — 87635 SARS-COV-2 COVID-19 AMP PRB: CPT | Performed by: PHYSICIAN ASSISTANT

## 2024-08-26 PROCEDURE — 96365 THER/PROPH/DIAG IV INF INIT: CPT | Mod: 59

## 2024-08-26 PROCEDURE — 96376 TX/PRO/DX INJ SAME DRUG ADON: CPT

## 2024-08-26 PROCEDURE — 96375 TX/PRO/DX INJ NEW DRUG ADDON: CPT

## 2024-08-26 PROCEDURE — 36415 COLL VENOUS BLD VENIPUNCTURE: CPT | Performed by: PHYSICIAN ASSISTANT

## 2024-08-26 PROCEDURE — 99285 EMERGENCY DEPT VISIT HI MDM: CPT | Mod: 25

## 2024-08-26 PROCEDURE — G0378 HOSPITAL OBSERVATION PER HR: HCPCS

## 2024-08-26 PROCEDURE — 25500020 PHARM REV CODE 255: Performed by: FAMILY MEDICINE

## 2024-08-26 PROCEDURE — 25000242 PHARM REV CODE 250 ALT 637 W/ HCPCS: Performed by: SURGERY

## 2024-08-26 PROCEDURE — 63600175 PHARM REV CODE 636 W HCPCS: Performed by: SURGERY

## 2024-08-26 PROCEDURE — 25000003 PHARM REV CODE 250: Performed by: SURGERY

## 2024-08-26 PROCEDURE — 94640 AIRWAY INHALATION TREATMENT: CPT

## 2024-08-26 PROCEDURE — 80053 COMPREHEN METABOLIC PANEL: CPT | Performed by: PHYSICIAN ASSISTANT

## 2024-08-26 PROCEDURE — 96366 THER/PROPH/DIAG IV INF ADDON: CPT

## 2024-08-26 PROCEDURE — 81003 URINALYSIS AUTO W/O SCOPE: CPT | Performed by: PHYSICIAN ASSISTANT

## 2024-08-26 PROCEDURE — 94761 N-INVAS EAR/PLS OXIMETRY MLT: CPT

## 2024-08-26 PROCEDURE — 96367 TX/PROPH/DG ADDL SEQ IV INF: CPT

## 2024-08-26 PROCEDURE — 81001 URINALYSIS AUTO W/SCOPE: CPT | Performed by: PHYSICIAN ASSISTANT

## 2024-08-26 PROCEDURE — 83690 ASSAY OF LIPASE: CPT | Performed by: PHYSICIAN ASSISTANT

## 2024-08-26 PROCEDURE — 85025 COMPLETE CBC W/AUTO DIFF WBC: CPT | Performed by: PHYSICIAN ASSISTANT

## 2024-08-26 PROCEDURE — 96372 THER/PROPH/DIAG INJ SC/IM: CPT | Performed by: SURGERY

## 2024-08-26 PROCEDURE — 63600175 PHARM REV CODE 636 W HCPCS: Performed by: FAMILY MEDICINE

## 2024-08-26 PROCEDURE — 99223 1ST HOSP IP/OBS HIGH 75: CPT | Mod: AI,57,, | Performed by: SURGERY

## 2024-08-26 PROCEDURE — 83036 HEMOGLOBIN GLYCOSYLATED A1C: CPT | Performed by: SURGERY

## 2024-08-26 PROCEDURE — 25000003 PHARM REV CODE 250: Performed by: FAMILY MEDICINE

## 2024-08-26 RX ORDER — IOPAMIDOL 755 MG/ML
100 INJECTION, SOLUTION INTRAVASCULAR
Status: COMPLETED | OUTPATIENT
Start: 2024-08-26 | End: 2024-08-26

## 2024-08-26 RX ORDER — ACETAMINOPHEN 325 MG/1
650 TABLET ORAL EVERY 8 HOURS PRN
Status: DISCONTINUED | OUTPATIENT
Start: 2024-08-26 | End: 2024-08-29 | Stop reason: HOSPADM

## 2024-08-26 RX ORDER — MORPHINE SULFATE 4 MG/ML
4 INJECTION, SOLUTION INTRAMUSCULAR; INTRAVENOUS
Status: COMPLETED | OUTPATIENT
Start: 2024-08-26 | End: 2024-08-26

## 2024-08-26 RX ORDER — DEXTROSE MONOHYDRATE, SODIUM CHLORIDE, AND POTASSIUM CHLORIDE 50; 1.49; 4.5 G/1000ML; G/1000ML; G/1000ML
INJECTION, SOLUTION INTRAVENOUS CONTINUOUS
Status: DISCONTINUED | OUTPATIENT
Start: 2024-08-26 | End: 2024-08-29 | Stop reason: HOSPADM

## 2024-08-26 RX ORDER — ONDANSETRON HYDROCHLORIDE 2 MG/ML
4 INJECTION, SOLUTION INTRAVENOUS
Status: COMPLETED | OUTPATIENT
Start: 2024-08-26 | End: 2024-08-26

## 2024-08-26 RX ORDER — TALC
6 POWDER (GRAM) TOPICAL NIGHTLY PRN
Status: DISCONTINUED | OUTPATIENT
Start: 2024-08-26 | End: 2024-08-29 | Stop reason: HOSPADM

## 2024-08-26 RX ORDER — ENOXAPARIN SODIUM 100 MG/ML
40 INJECTION SUBCUTANEOUS EVERY 24 HOURS
Status: DISCONTINUED | OUTPATIENT
Start: 2024-08-26 | End: 2024-08-29 | Stop reason: HOSPADM

## 2024-08-26 RX ORDER — ATORVASTATIN CALCIUM 40 MG/1
40 TABLET, FILM COATED ORAL DAILY
Status: DISCONTINUED | OUTPATIENT
Start: 2024-08-27 | End: 2024-08-29 | Stop reason: HOSPADM

## 2024-08-26 RX ORDER — AMLODIPINE BESYLATE 10 MG/1
10 TABLET ORAL DAILY
Status: DISCONTINUED | OUTPATIENT
Start: 2024-08-27 | End: 2024-08-29 | Stop reason: HOSPADM

## 2024-08-26 RX ORDER — ONDANSETRON 4 MG/1
8 TABLET, ORALLY DISINTEGRATING ORAL EVERY 8 HOURS PRN
Status: DISCONTINUED | OUTPATIENT
Start: 2024-08-26 | End: 2024-08-29 | Stop reason: HOSPADM

## 2024-08-26 RX ORDER — ALBUTEROL SULFATE 90 UG/1
2 INHALANT RESPIRATORY (INHALATION) 2 TIMES DAILY
Status: DISCONTINUED | OUTPATIENT
Start: 2024-08-26 | End: 2024-08-29 | Stop reason: HOSPADM

## 2024-08-26 RX ORDER — CARVEDILOL 6.25 MG/1
6.25 TABLET ORAL 2 TIMES DAILY
Status: DISCONTINUED | OUTPATIENT
Start: 2024-08-26 | End: 2024-08-29 | Stop reason: HOSPADM

## 2024-08-26 RX ORDER — MORPHINE SULFATE 2 MG/ML
2 INJECTION, SOLUTION INTRAMUSCULAR; INTRAVENOUS EVERY 4 HOURS PRN
Status: DISCONTINUED | OUTPATIENT
Start: 2024-08-26 | End: 2024-08-29 | Stop reason: HOSPADM

## 2024-08-26 RX ORDER — POTASSIUM CHLORIDE 7.45 MG/ML
10 INJECTION INTRAVENOUS
Status: COMPLETED | OUTPATIENT
Start: 2024-08-26 | End: 2024-08-26

## 2024-08-26 RX ORDER — MORPHINE SULFATE 4 MG/ML
4 INJECTION, SOLUTION INTRAMUSCULAR; INTRAVENOUS EVERY 4 HOURS PRN
Status: DISCONTINUED | OUTPATIENT
Start: 2024-08-26 | End: 2024-08-29 | Stop reason: HOSPADM

## 2024-08-26 RX ADMIN — MORPHINE SULFATE 4 MG: 4 INJECTION, SOLUTION INTRAMUSCULAR; INTRAVENOUS at 08:08

## 2024-08-26 RX ADMIN — CARVEDILOL 6.25 MG: 6.25 TABLET, FILM COATED ORAL at 08:08

## 2024-08-26 RX ADMIN — MORPHINE SULFATE 4 MG: 4 INJECTION, SOLUTION INTRAMUSCULAR; INTRAVENOUS at 05:08

## 2024-08-26 RX ADMIN — ALBUTEROL SULFATE 2 PUFF: 108 INHALANT RESPIRATORY (INHALATION) at 07:08

## 2024-08-26 RX ADMIN — POTASSIUM CHLORIDE 10 MEQ: 7.46 INJECTION, SOLUTION INTRAVENOUS at 01:08

## 2024-08-26 RX ADMIN — ONDANSETRON 4 MG: 2 INJECTION INTRAMUSCULAR; INTRAVENOUS at 01:08

## 2024-08-26 RX ADMIN — ENOXAPARIN SODIUM 40 MG: 40 INJECTION SUBCUTANEOUS at 06:08

## 2024-08-26 RX ADMIN — POTASSIUM CHLORIDE, DEXTROSE MONOHYDRATE AND SODIUM CHLORIDE: 150; 5; 450 INJECTION, SOLUTION INTRAVENOUS at 06:08

## 2024-08-26 RX ADMIN — ONDANSETRON 4 MG: 2 INJECTION INTRAMUSCULAR; INTRAVENOUS at 05:08

## 2024-08-26 RX ADMIN — SODIUM CHLORIDE 1000 ML: 9 INJECTION, SOLUTION INTRAVENOUS at 01:08

## 2024-08-26 RX ADMIN — Medication 6 MG: at 08:08

## 2024-08-26 RX ADMIN — IOPAMIDOL 100 ML: 755 INJECTION, SOLUTION INTRAVENOUS at 01:08

## 2024-08-26 NOTE — PLAN OF CARE
Problem: Adult Inpatient Plan of Care  Goal: Plan of Care Review  8/26/2024 1758 by Nicole Urrutia RN  Outcome: Progressing  8/26/2024 1757 by Nicole Urrutia RN  Outcome: Progressing  Goal: Patient-Specific Goal (Individualized)  8/26/2024 1758 by Nicole Urrutia RN  Outcome: Progressing  8/26/2024 1757 by Nicole Urrutia RN  Outcome: Progressing  Goal: Absence of Hospital-Acquired Illness or Injury  8/26/2024 1758 by Nicole Urrutia RN  Outcome: Progressing  8/26/2024 1757 by Nicole Urrutia RN  Outcome: Progressing  Goal: Optimal Comfort and Wellbeing  8/26/2024 1758 by Nicole Urrutia RN  Outcome: Progressing  8/26/2024 1757 by Nicole Urrutia RN  Outcome: Progressing  Goal: Readiness for Transition of Care  8/26/2024 1758 by Nicole Urrutia RN  Outcome: Progressing  8/26/2024 1757 by Nicole Urrutia RN  Outcome: Progressing     Problem: Diabetes Comorbidity  Goal: Blood Glucose Level Within Targeted Range  8/26/2024 1758 by Nicole Urrutia RN  Outcome: Progressing  8/26/2024 1757 by Nicole Urrutia RN  Outcome: Progressing

## 2024-08-26 NOTE — H&P
[facility] - Emergency Department  General Surgery  History & Physical    Patient Name: Faviola King  MRN: [id number]  Admission Date: 8/26/2024  Attending Physician: Vj Vu DO   Primary Care Provider: Shantanu Larson MD    Patient information was obtained from patient and ER records.     Subjective:     Chief Complaint/Reason for Admission: abdominal pain    History of Present Illness:  Patient is a 64 y.o. female presents with one day history of epigastric/RUQ pain x 1 day.  Had similar episodes in past but never this bad.  + Nausea, emesis, inability to eat, and overall discomfort.  Worse with greasy foods. CT showed cholelithiasis.      No current facility-administered medications on file prior to encounter.     Current Outpatient Medications on File Prior to Encounter   Medication Sig    albuterol (VENTOLIN HFA) 90 mcg/actuation inhaler Inhale 2 puffs into the lungs 2 (two) times a day. Rescue    amLODIPine (NORVASC) 10 MG tablet Take 1 tablet (10 mg total) by mouth once daily.    atorvastatin (LIPITOR) 40 MG tablet Take 1 tablet (40 mg total) by mouth once daily.    carvediloL (COREG) 6.25 MG tablet Take 1 tablet (6.25 mg total) by mouth 2 (two) times daily.    cholecalciferol, vitamin D3, (VITAMIN D3) 125 mcg (5,000 unit) Tab Take 1 tablet (5,000 Units total) by mouth 3 (three) times a week.    pantoprazole (PROTONIX) 40 MG tablet Take 1 tablet (40 mg total) by mouth once daily.    potassium chloride SA (K-DUR,KLOR-CON) 20 MEQ tablet Take 1 tablet (20 mEq total) by mouth 2 (two) times daily. (Patient taking differently: Take 20 mEq by mouth 3 (three) times daily.)    [DISCONTINUED] ALPRAZolam (XANAX) 0.25 MG tablet Take 1 tablet (0.25 mg total) by mouth nightly as needed for Anxiety.    [DISCONTINUED] methocarbamoL (ROBAXIN) 500 MG Tab Take 500 mg by mouth every 6 (six) hours as needed.    [DISCONTINUED] nicotine (NICODERM CQ) 14 mg/24 hr Place 1 patch onto the skin once daily.  (Patient not taking: Reported on 2023)    [DISCONTINUED] ondansetron (ZOFRAN-ODT) 4 MG TbDL Take 2 tablets (8 mg total) by mouth 2 (two) times daily. (Patient not taking: Reported on 2023)    [DISCONTINUED] oxyCODONE (ROXICODONE) 5 MG immediate release tablet Take 5 mg by mouth every 6 (six) hours as needed.    [DISCONTINUED] traZODone (DESYREL) 50 MG tablet Take 25 mg by mouth nightly as needed.       Review of patient's allergies indicates:  No Known Allergies    Past Medical History:   Diagnosis Date    GERD (gastroesophageal reflux disease)     Heart attack     Hypertension     Nodule of upper lobe of right lung     1.5 x 1.2 cm nodule RUL  23     Past Surgical History:   Procedure Laterality Date     SECTION      HYSTERECTOMY       Family History       Problem Relation (Age of Onset)    Heart failure Mother    Hypertension Mother, Father          Tobacco Use    Smoking status: Some Days     Current packs/day: 1.00     Average packs/day: 1 pack/day for 40.0 years (40.0 ttl pk-yrs)     Types: Cigarettes    Smokeless tobacco: Never    Tobacco comments:     1 PPD smoking history  for ~ 40 years: over the last year cut back to ~ 1/2 PPD: recently to 1 or 2 cigarettes/day   Substance and Sexual Activity    Alcohol use: Never    Drug use: Yes     Types: Marijuana    Sexual activity: Not Currently     Review of Systems   Constitutional:  Negative for activity change, appetite change, fatigue and fever.   HENT:  Negative for trouble swallowing.    Respiratory:  Negative for cough and shortness of breath.    Cardiovascular:  Negative for chest pain and palpitations.   Gastrointestinal:  Positive for abdominal pain, nausea and vomiting. Negative for abdominal distention, blood in stool, constipation and diarrhea.   Genitourinary:  Negative for flank pain.   Musculoskeletal:  Negative for neck pain and neck stiffness.   Neurological:  Negative for weakness.     Objective:     Vital Signs (Most  Recent):  Temp: 98.3 °F (36.8 °C) (08/26/24 1655)  Pulse: 93 (08/26/24 1655)  Resp: 16 (08/26/24 1655)  BP: (!) 135/93 (08/26/24 1655)  SpO2: 96 % (08/26/24 1655) Vital Signs (24h Range):  Temp:  [97.4 °F (36.3 °C)-98.3 °F (36.8 °C)] 98.3 °F (36.8 °C)  Pulse:  [78-93] 93  Resp:  [16-17] 16  SpO2:  [17 %-99 %] 96 %  BP: (135-150)/(88-93) 135/93     Weight: 72.6 kg (160 lb)  Body mass index is 32.32 kg/m².    Physical Exam  Constitutional:       General: She is not in acute distress.  HENT:      Head: Normocephalic.   Cardiovascular:      Rate and Rhythm: Normal rate and regular rhythm.      Pulses: Normal pulses.   Pulmonary:      Effort: Pulmonary effort is normal. No respiratory distress.      Breath sounds: Normal breath sounds.   Abdominal:      General: Abdomen is flat. There is no distension.      Palpations: Abdomen is soft.      Tenderness: There is abdominal tenderness.   Musculoskeletal:         General: Normal range of motion.   Skin:     General: Skin is warm.   Neurological:      General: No focal deficit present.      Mental Status: She is oriented to person, place, and time.         Significant Labs:  I have reviewed all pertinent lab results within the past 24 hours.  CBC:   Recent Labs   Lab 08/26/24  1019   WBC 8.29   RBC 5.15   HGB 14.8   HCT 46.4      MCV 90.1   MCH 28.7   MCHC 31.9*     BMP:   Recent Labs   Lab 08/26/24  1130   *      K 2.9*      CO2 25   BUN 10   CREATININE 0.72   CALCIUM 9.9     CMP:   Recent Labs   Lab 08/26/24  1130   *   CALCIUM 9.9   ALBUMIN 4.2   PROT 8.7*      K 2.9*   CO2 25      BUN 10   CREATININE 0.72   ALKPHOS 81   ALT 20   AST 23   BILITOT 0.4       Significant Diagnostics:  I have reviewed all pertinent imaging results/findings within the past 24 hours.  CT: I have reviewed all pertinent results/findings within the past 24 hours and my personal findings are:  cholelithiasis    Assessment/Plan:     Active Diagnoses:     Diagnosis Date Noted POA    PRINCIPAL PROBLEM:  Calculus of gallbladder with acute cholecystitis without obstruction [K80.00] 08/26/2024 Yes      Problems Resolved During this Admission:     VTE Risk Mitigation (From admission, onward)      None          Admission and OR tomorrow for laparoscopic cholecystectomy.  Risks and benefits explained including risk of bleeding, infection, open operation, injury to surrounding organs, possible need for additional operation.  All questions answered    Rickey Santizo MD  General Surgery  [facility] - Emergency Department

## 2024-08-26 NOTE — ED NOTES
Pt called out stating legs are cramping up. Pt's daughter came to nurses station right after and I spoke with pt's daughter telling her that her potassium is 2.9 and we will be correcting that in hopes of helping her cramping as well as she is going to go for a CT of her abdomen.

## 2024-08-26 NOTE — ED PROVIDER NOTES
Encounter Date: 2024       History     Chief Complaint   Patient presents with    Abdominal Pain    Nausea    Headache     Pt reports abd pain, nausea and headache that started this AM     This patient is a 64 y.o. female that presents to the ED with c/o Abdominal Pain. The patient reports having abdominal pain, nausea, Headaches and vomiting since this morning. The patient's daughter reports the patient vomiting Dark Brown liquid then Clear liquid before arrival to the ED. The patient has been coughing as well and denies being around anyone that is or has been sick recently.     The history is provided by the patient and a relative. No  was used.     Review of patient's allergies indicates:  No Known Allergies  Past Medical History:   Diagnosis Date    GERD (gastroesophageal reflux disease)     Heart attack     Hypertension     Nodule of upper lobe of right lung     1.5 x 1.2 cm nodule RUL  23     Past Surgical History:   Procedure Laterality Date     SECTION      HYSTERECTOMY      LAPAROSCOPIC CHOLECYSTECTOMY N/A 2024    Procedure: CHOLECYSTECTOMY, LAPAROSCOPIC;  Surgeon: Rickey Santizo MD;  Location: [facility];  Service: General;  Laterality: N/A;     Family History   Problem Relation Name Age of Onset    Heart failure Mother      Hypertension Mother      Hypertension Father      Diabetes Neg Hx      Cancer Neg Hx       Social History     Tobacco Use    Smoking status: Some Days     Current packs/day: 1.00     Average packs/day: 1 pack/day for 40.0 years (40.0 ttl pk-yrs)     Types: Cigarettes    Smokeless tobacco: Never    Tobacco comments:     1 PPD smoking history  for ~ 40 years: over the last year cut back to ~ 1/2 PPD: recently to 1 or 2 cigarettes/day   Substance Use Topics    Alcohol use: Never    Drug use: Yes     Types: Marijuana     Review of Systems   Constitutional:  Negative for fever.   Respiratory:  Positive for cough. Negative for shortness of  breath.    Cardiovascular:  Negative for chest pain.   Gastrointestinal:  Positive for abdominal pain, nausea and vomiting.   Neurological:  Positive for headaches.       Physical Exam     Initial Vitals   BP Pulse Resp Temp SpO2   08/26/24 0947 08/26/24 0947 08/26/24 0958 08/26/24 0947 08/26/24 0945   (!) 149/88 78 17 97.4 °F (36.3 °C) (!) 17 %      MAP       --                Physical Exam    Constitutional: She appears well-developed and well-nourished.   HENT:   Head: Normocephalic and atraumatic.   Right Ear: External ear normal.   Left Ear: External ear normal.   Nose: Nose normal.   Mouth/Throat: Oropharynx is clear and moist.   Eyes: Conjunctivae and EOM are normal. Pupils are equal, round, and reactive to light.   Neck: Neck supple.   Normal range of motion.  Cardiovascular:  Normal rate, regular rhythm, normal heart sounds and intact distal pulses.           Pulmonary/Chest: Breath sounds normal.   Abdominal: Abdomen is soft. Bowel sounds are normal.   Genitourinary:    Vagina and uterus normal.     Musculoskeletal:         General: Normal range of motion.      Cervical back: Normal range of motion and neck supple.     Neurological: She is alert and oriented to person, place, and time. She has normal strength and normal reflexes.   Skin: Skin is warm. Capillary refill takes less than 2 seconds.   Psychiatric: She has a normal mood and affect. Her behavior is normal. Judgment and thought content normal.         ED Course   Procedures  Labs Reviewed   COMPREHENSIVE METABOLIC PANEL - Abnormal       Result Value    Sodium 136      Potassium 2.9 (*)     Chloride 105      CO2 25      Anion Gap 9      Glucose 132 (*)     BUN 10      Creatinine 0.72      BUN/Creatinine Ratio 14      Calcium 9.9      Total Protein 8.7 (*)     Albumin 4.2      Globulin 4.5 (*)     A/G Ratio 0.9      Bilirubin, Total 0.4      Alk Phos 81      ALT 20      AST 23      eGFR 92     URINALYSIS, REFLEX TO URINE CULTURE - Abnormal    Color,  UA Colorless      Clarity, UA Clear      pH, UA 8.0      Leukocytes, UA Negative      Nitrites, UA Negative      Protein, UA 30 (*)     Glucose, UA Normal      Ketones, UA Trace      Urobilinogen, UA Normal      Bilirubin, UA Negative      Blood, UA Negative      Specific Gravity, UA 1.015     CBC WITH DIFFERENTIAL - Abnormal    WBC 8.29      RBC 5.15      Hemoglobin 14.8      Hematocrit 46.4      MCV 90.1      MCH 28.7      MCHC 31.9 (*)     RDW 14.2      Platelet Count 295      MPV 9.7      Neutrophils % 79.1 (*)     Lymphocytes % 16.8 (*)     Monocytes % 3.4      Eosinophils % 0.1 (*)     Basophils % 0.1      Immature Granulocytes % 0.5 (*)     nRBC, Auto 0.0      Neutrophils, Abs 6.56      Lymphocytes, Absolute 1.39      Monocytes, Absolute 0.28      Eosinophils, Absolute 0.01      Basophils, Absolute 0.01      Immature Granulocytes, Absolute 0.04      nRBC, Absolute 0.00      Diff Type Auto     URINALYSIS, MICROSCOPIC - Abnormal    WBC, UA 1      RBC, UA 5 (*)     Bacteria, UA Occasional (*)     Squamous Epithelial Cells, UA Occasional (*)     Mucous Occasional (*)    LIPASE - Normal    Lipase 23     SARS-COV-2 RNA AMPLIFICATION, QUAL - Normal    SARS COV-2 Molecular Negative      Narrative:     Negative SARS-CoV results should not be used as the sole basis for treatment or patient management decisions; negative results should be considered in the context of a patient's recent exposures, history and the presene of clinical signs and symptoms consistent with COVID-19.  Negative results should be treated as presumptive and confirmed by molecular assay, if necessary for patient management.   CBC W/ AUTO DIFFERENTIAL    Narrative:     The following orders were created for panel order CBC W/ AUTO DIFFERENTIAL.  Procedure                               Abnormality         Status                     ---------                               -----------         ------                     CBC with Differential[[id number]]        Abnormal            Final result                 Please view results for these tests on the individual orders.          Imaging Results              CT Abdomen Pelvis With IV Contrast NO Oral Contrast (Final result)  Result time 08/26/24 13:41:03      Final result by Michael Pedraza II, MD (08/26/24 13:41:03)                   Impression:      Cholelithiasis.  No other definite acute findings.      Electronically signed by: Michael Pedraza  Date:    08/26/2024  Time:    13:41               Narrative:    EXAMINATION:  CT ABDOMEN PELVIS WITH IV CONTRAST    CLINICAL HISTORY:  Epigastric pain;    TECHNIQUE:  Axial CT imaging of the abdomen and pelvis is performed with intravenous contrast. Contrast dose is 100 cc Isovue 370.    CT dose reduction technique used - Dose Rite and tube current modulation.    COMPARISON:  20 September 2018    FINDINGS:  Cardiac and lung bases are within normal limits    CT abdomen: The gallbladder has multiple calculi present.  The liver, spleen, pancreas and adrenal glands are normal in size and enhancement.  No evidence of focal lesion is demonstrated in these solid organs.    Kidneys are normal in size and enhancement.  No evidence of hydronephrosis or nephrolithiasis is seen.    The bowel caliber is normal and no wall thickening or adjacent inflammatory change is seen.  No evidence of free fluid or free air is present.  Appendix appears normal.    CT pelvis: The pelvic bowel appears within normal limits.  Bladder shows no evidence of abnormality.  The uterus and ovaries were not identified.                                    X-Rays:   Independently Interpreted Readings:   Other Readings:  08/26/24 1343  CT Abdomen Pelvis With IV Contrast NO Oral Contrast  Performed: 08/26/24 1335  Final         Impression: Cholelithiasis. No other definite acute findings. Electronically signed by: Michael Pedraza Date: 08/26/2024 Time: 13:41          Medications   albuterol inhaler 2 puff (2  puffs Inhalation Given 8/28/24 2011)   amLODIPine tablet 10 mg (10 mg Oral Given 8/28/24 0831)   atorvastatin tablet 40 mg (40 mg Oral Given 8/28/24 0831)   carvediloL tablet 6.25 mg (6.25 mg Oral Given 8/28/24 2037)   dextrose 5 % and 0.45 % NaCl with KCl 20 mEq infusion ( Intravenous New Bag 8/28/24 1417)   ondansetron disintegrating tablet 8 mg (8 mg Oral Given 8/28/24 0317)   melatonin tablet 6 mg (6 mg Oral Given 8/26/24 2057)   acetaminophen tablet 650 mg (has no administration in time range)   enoxaparin injection 40 mg (40 mg Subcutaneous Given 8/28/24 1700)   morphine injection 2 mg (2 mg Intravenous Given 8/28/24 0335)   morphine injection 4 mg (4 mg Intravenous Not Given 8/27/24 1954)   dextrose 10% bolus 125 mL 125 mL (has no administration in time range)   dextrose 10% bolus 250 mL 250 mL (has no administration in time range)   HYDROcodone-acetaminophen 7.5-325 mg per tablet 1 tablet (1 tablet Oral Given 8/28/24 1830)   promethazine injection 12.5 mg (12.5 mg Intramuscular Given 8/28/24 0840)   trazodone split tablet 25 mg (25 mg Oral Given 8/28/24 2037)   potassium chloride 10 mEq in 100 mL IVPB (0 mEq Intravenous Stopped 8/26/24 1435)   sodium chloride 0.9% bolus 1,000 mL 1,000 mL (0 mLs Intravenous Stopped 8/26/24 1436)   ondansetron injection 4 mg (4 mg Intravenous Given 8/26/24 1316)   iopamidoL (ISOVUE-370) injection 100 mL (100 mLs Intravenous Given 8/26/24 1335)   morphine injection 4 mg (4 mg Intravenous Given 8/26/24 1727)   ondansetron injection 4 mg (4 mg Intravenous Given 8/26/24 1727)     Medical Decision Making  Risk  Prescription drug management.               ED Course as of 08/28/24 2228   Mon Aug 26, 2024   1346 08/26/24 1343  CT Abdomen Pelvis With IV Contrast NO Oral Contrast  Performed: 08/26/24 1335  Final         Impression: Cholelithiasis. No other definite acute findings. Electronically signed by: Michael Pedraza Date: 08/26/2024 Time: 13:41       [CM]      ED Course User  Index  [CM] Keila Le               Medical Decision Making:   Initial Assessment:   This patient is a 64 y.o. female that presents to the ED with c/o Abdominal Pain. The patient reports having abdominal pain, nausea, Headaches and vomiting since this morning. The patient's daughter reports the patient vomiting Dark Brown liquid then Clear liquid before arrival to the ED. The patient has been coughing as well and denies being around anyone that is or has been sick recently.     The history is provided by the patient and a relative. No  was used.     Differential Diagnosis:   Cholelithiasis  hypokalemia  ED Management:  August case with  will admit the patient for observation             Clinical Impression:  Final diagnoses:  [K80.20] Cholelithiasis          ED Disposition Condition    Observation Stable                Vj Vu, DO  08/28/24 4245

## 2024-08-26 NOTE — ED NOTES
Attempted to call report to [facility]. Placed on hold for approx. 7 min with no answer. Will try again in a few minute.

## 2024-08-26 NOTE — ED NOTES
Lab called to get an update on results for CMP. [facility] stated results should be coming over shortly.

## 2024-08-26 NOTE — PHARMACY MED REC
"Admission Medication History     The home medication history was taken by Lachelle Morgan.    You may go to "Admission" then "Reconcile Home Medications" tabs to review and/or act upon these items.     The home medication list has been updated by the Pharmacy department.   Please read ALL comments highlighted in yellow.   Please address this information as you see fit.    Feel free to contact us if you have any questions or require assistance.      Patient reports no longer taking the following medication(s). The medication(s) listed below were removed from the home medication list. Please reorder if appropriate:  Alprazolam 0.25 mg  Methocarbamol 500 mg  Nicotine 14 mg/24 hr  Ondansetron 4 mg ODT  Oxycodone 5 mg IR  Trazodone 50 mg    Medications listed below were obtained from: Patient/family and Analytic software- [employer]  (Not in a hospital admission)        Current Outpatient Medications on File Prior to Encounter   Medication Sig Dispense Refill Last Dose    albuterol (VENTOLIN HFA) 90 mcg/actuation inhaler Inhale 2 puffs into the lungs 2 (two) times a day. Rescue 18 g 6 8/26/2024    amLODIPine (NORVASC) 10 MG tablet Take 1 tablet (10 mg total) by mouth once daily. 90 tablet 1 8/26/2024    atorvastatin (LIPITOR) 40 MG tablet Take 1 tablet (40 mg total) by mouth once daily. 90 tablet 1 8/26/2024    carvediloL (COREG) 6.25 MG tablet Take 1 tablet (6.25 mg total) by mouth 2 (two) times daily. 180 tablet 0 8/26/2024    cholecalciferol, vitamin D3, (VITAMIN D3) 125 mcg (5,000 unit) Tab Take 1 tablet (5,000 Units total) by mouth 3 (three) times a week. 90 tablet 1 8/26/2024    pantoprazole (PROTONIX) 40 MG tablet Take 1 tablet (40 mg total) by mouth once daily. 90 tablet 1 8/26/2024    potassium chloride SA (K-DUR,KLOR-CON) 20 MEQ tablet Take 1 tablet (20 mEq total) by mouth 2 (two) times daily. (Patient taking differently: Take 20 mEq by mouth 3 (three) times daily.) 4 tablet 0 8/26/2024    [DISCONTINUED] ALPRAZolam " (XANAX) 0.25 MG tablet Take 1 tablet (0.25 mg total) by mouth nightly as needed for Anxiety. 30 tablet 1     [DISCONTINUED] methocarbamoL (ROBAXIN) 500 MG Tab Take 500 mg by mouth every 6 (six) hours as needed.       [DISCONTINUED] nicotine (NICODERM CQ) 14 mg/24 hr Place 1 patch onto the skin once daily. (Patient not taking: Reported on 2/27/2023) 30 patch 1     [DISCONTINUED] ondansetron (ZOFRAN-ODT) 4 MG TbDL Take 2 tablets (8 mg total) by mouth 2 (two) times daily. (Patient not taking: Reported on 1/26/2023) 20 tablet 0     [DISCONTINUED] oxyCODONE (ROXICODONE) 5 MG immediate release tablet Take 5 mg by mouth every 6 (six) hours as needed.       [DISCONTINUED] traZODone (DESYREL) 50 MG tablet Take 25 mg by mouth nightly as needed.            Potential issues to be addressed PRIOR TO DISCHARGE  No issues identified.    Lachelle Morgan  Pharmacy Tech Specialist - Medication History  EXT. [phone]    .

## 2024-08-26 NOTE — FIRST PROVIDER EVALUATION
Emergency Department TeleTriage Encounter Note      CHIEF COMPLAINT    Chief Complaint   Patient presents with    Abdominal Pain    Nausea    Headache     Pt reports abd pain, nausea and headache that started this AM       VITAL SIGNS   Initial Vitals   BP Pulse Resp Temp SpO2   08/26/24 0947 08/26/24 0947 08/26/24 0958 08/26/24 0947 08/26/24 0945   (!) 149/88 78 17 97.4 °F (36.3 °C) (!) 17 %      MAP       --                   ALLERGIES    Review of patient's allergies indicates:  No Known Allergies    PROVIDER TRIAGE NOTE  This is a teletriage evaluation of a 64 y.o. female presenting to the ED complaining of abdominal pain. Patient reports abdominal pain, nausea, headache, and vomiting since this morning.    Patient is alert and oriented. She speaks in complete sentences. She is laying down in the bed in no distress.     Initial orders will be placed and care will be transferred to an alternate provider when patient is roomed for a full evaluation. Any additional orders and the final disposition will be determined by that provider.         ORDERS  Labs Reviewed   CBC W/ AUTO DIFFERENTIAL    Narrative:     The following orders were created for panel order CBC W/ AUTO DIFFERENTIAL.  Procedure                               Abnormality         Status                     ---------                               -----------         ------                     CBC with Differential[[id number]]                                                        Please view results for these tests on the individual orders.   COMPREHENSIVE METABOLIC PANEL   LIPASE   URINALYSIS, REFLEX TO URINE CULTURE   SARS-COV-2 RNA AMPLIFICATION, QUAL   CBC WITH DIFFERENTIAL       ED Orders (720h ago, onward)      Start Ordered     Status Ordering Provider    08/26/24 1000 08/26/24 0959  Vital signs  Every 2 hours         Ordered PAKO ROCHA    08/26/24 1000 08/26/24 0959  CT Abdomen Pelvis With IV Contrast NO Oral Contrast  1 time imaging          Ordered AJPAKO G.    08/26/24 1000 08/26/24 0959  COVID-19 Rapid Screening  STAT         Ordered AJ, PAKO G.    08/26/24 1000 08/26/24 0959  Airborne and Contact and Droplet Isolation Status  Continuous         Ordered AJ, PAKO G.    08/26/24 0959 08/26/24 0959  Diet NPO  Diet effective now         Ordered AJ, PAKO G.    08/26/24 0959 08/26/24 0959  Insert peripheral IV  Once         Ordered AJ, PAKO G.    08/26/24 0959 08/26/24 0959  CBC W/ AUTO DIFFERENTIAL  STAT         Ordered AJ, PAKO G.    08/26/24 0959 08/26/24 0959  Comp. Metabolic Panel  STAT         Ordered AJ, PAKO G.    08/26/24 0959 08/26/24 0959  Lipase  STAT         Ordered AJ, PAKO G.    08/26/24 0959 08/26/24 0959  Urinalysis, Reflex to Urine Culture  STAT         Ordered AJ, PAKO G.    08/26/24 0959 08/26/24 0959  CBC with Differential  PROCEDURE ONCE         Ordered AJPAKO G.              Virtual Visit Note: The provider triage portion of this emergency department evaluation and documentation was performed via [employer], a HIPAA-compliant telemedicine application, in concert with a tele-presenter in the room. A face to face patient evaluation with one of my colleagues will occur once the patient is placed in an emergency department room.      DISCLAIMER: This note was prepared with [employer] voice recognition transcription software. Garbled syntax, mangled pronouns, and other bizarre constructions may be attributed to that software system.

## 2024-08-26 NOTE — Clinical Note
Diagnosis: Cholelithiasis [691099]   Future Attending Provider: FUNMI FAJARDO [[id number]]   Requested Bed Type: Standard [1]

## 2024-08-27 ENCOUNTER — ANESTHESIA (OUTPATIENT)
Dept: SURGERY | Facility: HOSPITAL | Age: 64
DRG: 419 | End: 2024-08-27
Payer: MEDICARE

## 2024-08-27 ENCOUNTER — ANESTHESIA EVENT (OUTPATIENT)
Dept: SURGERY | Facility: HOSPITAL | Age: 64
DRG: 419 | End: 2024-08-27
Payer: MEDICARE

## 2024-08-27 PROBLEM — K80.00 CALCULUS OF GALLBLADDER WITH ACUTE CHOLECYSTITIS WITHOUT OBSTRUCTION: Status: RESOLVED | Noted: 2024-08-26 | Resolved: 2024-08-27

## 2024-08-27 LAB
ALBUMIN SERPL BCP-MCNC: 3.5 G/DL (ref 3.5–5)
ALBUMIN/GLOB SERPL: 0.9 {RATIO}
ALP SERPL-CCNC: 68 U/L (ref 55–142)
ALT SERPL W P-5'-P-CCNC: 20 U/L (ref 13–56)
ANION GAP SERPL CALCULATED.3IONS-SCNC: 8 MMOL/L (ref 7–16)
AST SERPL W P-5'-P-CCNC: 21 U/L (ref 15–37)
BILIRUB SERPL-MCNC: 0.3 MG/DL (ref ?–1.2)
BUN SERPL-MCNC: 10 MG/DL (ref 7–18)
BUN/CREAT SERPL: 11 (ref 6–20)
CALCIUM SERPL-MCNC: 9.2 MG/DL (ref 8.5–10.1)
CHLORIDE SERPL-SCNC: 105 MMOL/L (ref 98–107)
CO2 SERPL-SCNC: 26 MMOL/L (ref 21–32)
CREAT SERPL-MCNC: 0.94 MG/DL (ref 0.55–1.02)
EGFR (NO RACE VARIABLE) (RUSH/TITUS): 67 ML/MIN/1.73M2
GLOBULIN SER-MCNC: 3.7 G/DL (ref 2–4)
GLUCOSE SERPL-MCNC: 96 MG/DL (ref 74–106)
POTASSIUM SERPL-SCNC: 3.2 MMOL/L (ref 3.5–5.1)
PROT SERPL-MCNC: 7.2 G/DL (ref 6.4–8.2)
SODIUM SERPL-SCNC: 136 MMOL/L (ref 136–145)

## 2024-08-27 PROCEDURE — 47562 LAPAROSCOPIC CHOLECYSTECTOMY: CPT | Mod: ,,, | Performed by: SURGERY

## 2024-08-27 PROCEDURE — 96372 THER/PROPH/DIAG INJ SC/IM: CPT

## 2024-08-27 PROCEDURE — 96372 THER/PROPH/DIAG INJ SC/IM: CPT | Performed by: SURGERY

## 2024-08-27 PROCEDURE — 96366 THER/PROPH/DIAG IV INF ADDON: CPT | Mod: 59

## 2024-08-27 PROCEDURE — 36415 COLL VENOUS BLD VENIPUNCTURE: CPT | Performed by: SURGERY

## 2024-08-27 PROCEDURE — 71000033 HC RECOVERY, INTIAL HOUR: Performed by: SURGERY

## 2024-08-27 PROCEDURE — 25000003 PHARM REV CODE 250: Performed by: NURSE ANESTHETIST, CERTIFIED REGISTERED

## 2024-08-27 PROCEDURE — 88304 TISSUE EXAM BY PATHOLOGIST: CPT | Mod: 26,,, | Performed by: PATHOLOGY

## 2024-08-27 PROCEDURE — 99900035 HC TECH TIME PER 15 MIN (STAT)

## 2024-08-27 PROCEDURE — 27201423 OPTIME MED/SURG SUP & DEVICES STERILE SUPPLY: Performed by: SURGERY

## 2024-08-27 PROCEDURE — 37000009 HC ANESTHESIA EA ADD 15 MINS: Performed by: SURGERY

## 2024-08-27 PROCEDURE — 80053 COMPREHEN METABOLIC PANEL: CPT | Performed by: SURGERY

## 2024-08-27 PROCEDURE — 25000003 PHARM REV CODE 250: Performed by: SURGERY

## 2024-08-27 PROCEDURE — 36000709 HC OR TIME LEV III EA ADD 15 MIN: Performed by: SURGERY

## 2024-08-27 PROCEDURE — 94640 AIRWAY INHALATION TREATMENT: CPT | Mod: XB

## 2024-08-27 PROCEDURE — 63600175 PHARM REV CODE 636 W HCPCS: Mod: JZ,JG | Performed by: SURGERY

## 2024-08-27 PROCEDURE — 36000708 HC OR TIME LEV III 1ST 15 MIN: Performed by: SURGERY

## 2024-08-27 PROCEDURE — 63600175 PHARM REV CODE 636 W HCPCS: Performed by: SURGERY

## 2024-08-27 PROCEDURE — 94761 N-INVAS EAR/PLS OXIMETRY MLT: CPT

## 2024-08-27 PROCEDURE — 88304 TISSUE EXAM BY PATHOLOGIST: CPT | Mod: TC,SUR | Performed by: SURGERY

## 2024-08-27 PROCEDURE — 96376 TX/PRO/DX INJ SAME DRUG ADON: CPT

## 2024-08-27 PROCEDURE — 37000008 HC ANESTHESIA 1ST 15 MINUTES: Performed by: SURGERY

## 2024-08-27 PROCEDURE — 63600175 PHARM REV CODE 636 W HCPCS: Performed by: NURSE ANESTHETIST, CERTIFIED REGISTERED

## 2024-08-27 PROCEDURE — G0378 HOSPITAL OBSERVATION PER HR: HCPCS

## 2024-08-27 PROCEDURE — 63600175 PHARM REV CODE 636 W HCPCS

## 2024-08-27 RX ORDER — PHENYLEPHRINE HYDROCHLORIDE 10 MG/ML
INJECTION INTRAVENOUS
Status: DISCONTINUED | OUTPATIENT
Start: 2024-08-27 | End: 2024-08-27

## 2024-08-27 RX ORDER — HYDROCODONE BITARTRATE AND ACETAMINOPHEN 7.5; 325 MG/1; MG/1
1 TABLET ORAL EVERY 6 HOURS PRN
Qty: 15 TABLET | Refills: 0 | Status: SHIPPED | OUTPATIENT
Start: 2024-08-27

## 2024-08-27 RX ORDER — PROPOFOL 10 MG/ML
VIAL (ML) INTRAVENOUS
Status: DISCONTINUED | OUTPATIENT
Start: 2024-08-27 | End: 2024-08-27

## 2024-08-27 RX ORDER — CEFAZOLIN SODIUM 1 G/3ML
INJECTION, POWDER, FOR SOLUTION INTRAMUSCULAR; INTRAVENOUS
Status: DISCONTINUED | OUTPATIENT
Start: 2024-08-27 | End: 2024-08-27

## 2024-08-27 RX ORDER — ONDANSETRON HYDROCHLORIDE 2 MG/ML
INJECTION, SOLUTION INTRAVENOUS
Status: DISCONTINUED | OUTPATIENT
Start: 2024-08-27 | End: 2024-08-27

## 2024-08-27 RX ORDER — MORPHINE SULFATE 10 MG/ML
4 INJECTION INTRAMUSCULAR; INTRAVENOUS; SUBCUTANEOUS EVERY 5 MIN PRN
Status: DISCONTINUED | OUTPATIENT
Start: 2024-08-27 | End: 2024-08-27 | Stop reason: HOSPADM

## 2024-08-27 RX ORDER — PROMETHAZINE HYDROCHLORIDE 25 MG/ML
12.5 INJECTION, SOLUTION INTRAMUSCULAR; INTRAVENOUS EVERY 6 HOURS PRN
Status: DISCONTINUED | OUTPATIENT
Start: 2024-08-27 | End: 2024-08-29 | Stop reason: HOSPADM

## 2024-08-27 RX ORDER — FENTANYL CITRATE 50 UG/ML
INJECTION, SOLUTION INTRAMUSCULAR; INTRAVENOUS
Status: DISCONTINUED | OUTPATIENT
Start: 2024-08-27 | End: 2024-08-27

## 2024-08-27 RX ORDER — LIDOCAINE HYDROCHLORIDE 20 MG/ML
INJECTION, SOLUTION EPIDURAL; INFILTRATION; INTRACAUDAL; PERINEURAL
Status: DISCONTINUED | OUTPATIENT
Start: 2024-08-27 | End: 2024-08-27

## 2024-08-27 RX ORDER — DIPHENHYDRAMINE HYDROCHLORIDE 50 MG/ML
25 INJECTION INTRAMUSCULAR; INTRAVENOUS EVERY 6 HOURS PRN
Status: DISCONTINUED | OUTPATIENT
Start: 2024-08-27 | End: 2024-08-27 | Stop reason: HOSPADM

## 2024-08-27 RX ORDER — MEPERIDINE HYDROCHLORIDE 25 MG/ML
25 INJECTION INTRAMUSCULAR; INTRAVENOUS; SUBCUTANEOUS EVERY 10 MIN PRN
Status: DISCONTINUED | OUTPATIENT
Start: 2024-08-27 | End: 2024-08-27 | Stop reason: HOSPADM

## 2024-08-27 RX ORDER — ONDANSETRON HYDROCHLORIDE 2 MG/ML
4 INJECTION, SOLUTION INTRAVENOUS DAILY PRN
Status: DISCONTINUED | OUTPATIENT
Start: 2024-08-27 | End: 2024-08-27 | Stop reason: HOSPADM

## 2024-08-27 RX ORDER — BUPIVACAINE HYDROCHLORIDE 2.5 MG/ML
INJECTION, SOLUTION EPIDURAL; INFILTRATION; INTRACAUDAL
Status: DISCONTINUED | OUTPATIENT
Start: 2024-08-27 | End: 2024-08-27 | Stop reason: HOSPADM

## 2024-08-27 RX ORDER — DEXAMETHASONE SODIUM PHOSPHATE 4 MG/ML
INJECTION, SOLUTION INTRA-ARTICULAR; INTRALESIONAL; INTRAMUSCULAR; INTRAVENOUS; SOFT TISSUE
Status: DISCONTINUED | OUTPATIENT
Start: 2024-08-27 | End: 2024-08-27

## 2024-08-27 RX ORDER — HYDROCODONE BITARTRATE AND ACETAMINOPHEN 7.5; 325 MG/1; MG/1
1 TABLET ORAL EVERY 6 HOURS PRN
Status: DISCONTINUED | OUTPATIENT
Start: 2024-08-27 | End: 2024-08-29

## 2024-08-27 RX ORDER — HYDROMORPHONE HYDROCHLORIDE 2 MG/ML
0.5 INJECTION, SOLUTION INTRAMUSCULAR; INTRAVENOUS; SUBCUTANEOUS EVERY 5 MIN PRN
Status: DISCONTINUED | OUTPATIENT
Start: 2024-08-27 | End: 2024-08-27

## 2024-08-27 RX ORDER — ROCURONIUM BROMIDE 10 MG/ML
INJECTION, SOLUTION INTRAVENOUS
Status: DISCONTINUED | OUTPATIENT
Start: 2024-08-27 | End: 2024-08-27

## 2024-08-27 RX ORDER — GLUCAGON 1 MG
1 KIT INJECTION
Status: DISCONTINUED | OUTPATIENT
Start: 2024-08-27 | End: 2024-08-27 | Stop reason: HOSPADM

## 2024-08-27 RX ADMIN — CEFAZOLIN 2 G: 1 INJECTION, POWDER, FOR SOLUTION INTRAMUSCULAR; INTRAVENOUS; PARENTERAL at 11:08

## 2024-08-27 RX ADMIN — PROPOFOL 140 MG: 10 INJECTION, EMULSION INTRAVENOUS at 11:08

## 2024-08-27 RX ADMIN — PHENYLEPHRINE HYDROCHLORIDE 200 MCG: 10 INJECTION INTRAVENOUS at 11:08

## 2024-08-27 RX ADMIN — CARVEDILOL 6.25 MG: 6.25 TABLET, FILM COATED ORAL at 08:08

## 2024-08-27 RX ADMIN — SUGAMMADEX 200 MG: 100 INJECTION, SOLUTION INTRAVENOUS at 11:08

## 2024-08-27 RX ADMIN — POTASSIUM CHLORIDE, DEXTROSE MONOHYDRATE AND SODIUM CHLORIDE: 150; 5; 450 INJECTION, SOLUTION INTRAVENOUS at 05:08

## 2024-08-27 RX ADMIN — ROCURONIUM BROMIDE 50 MG: 10 INJECTION, SOLUTION INTRAVENOUS at 11:08

## 2024-08-27 RX ADMIN — FENTANYL CITRATE 50 MCG: 50 INJECTION, SOLUTION INTRAMUSCULAR; INTRAVENOUS at 12:08

## 2024-08-27 RX ADMIN — ALBUTEROL SULFATE 2 PUFF: 108 INHALANT RESPIRATORY (INHALATION) at 09:08

## 2024-08-27 RX ADMIN — ALBUTEROL SULFATE 2 PUFF: 108 INHALANT RESPIRATORY (INHALATION) at 07:08

## 2024-08-27 RX ADMIN — ONDANSETRON 8 MG: 4 TABLET, ORALLY DISINTEGRATING ORAL at 08:08

## 2024-08-27 RX ADMIN — MORPHINE SULFATE 4 MG: 4 INJECTION, SOLUTION INTRAMUSCULAR; INTRAVENOUS at 04:08

## 2024-08-27 RX ADMIN — SODIUM CHLORIDE: 9 INJECTION, SOLUTION INTRAVENOUS at 11:08

## 2024-08-27 RX ADMIN — POTASSIUM CHLORIDE, DEXTROSE MONOHYDRATE AND SODIUM CHLORIDE: 150; 5; 450 INJECTION, SOLUTION INTRAVENOUS at 02:08

## 2024-08-27 RX ADMIN — ENOXAPARIN SODIUM 40 MG: 40 INJECTION SUBCUTANEOUS at 04:08

## 2024-08-27 RX ADMIN — FENTANYL CITRATE 50 MCG: 50 INJECTION, SOLUTION INTRAMUSCULAR; INTRAVENOUS at 11:08

## 2024-08-27 RX ADMIN — ONDANSETRON 4 MG: 2 INJECTION INTRAMUSCULAR; INTRAVENOUS at 11:08

## 2024-08-27 RX ADMIN — AMLODIPINE BESYLATE 10 MG: 10 TABLET ORAL at 08:08

## 2024-08-27 RX ADMIN — DEXAMETHASONE SODIUM PHOSPHATE 6 MG: 4 INJECTION, SOLUTION INTRA-ARTICULAR; INTRALESIONAL; INTRAMUSCULAR; INTRAVENOUS; SOFT TISSUE at 11:08

## 2024-08-27 RX ADMIN — ONDANSETRON 8 MG: 4 TABLET, ORALLY DISINTEGRATING ORAL at 01:08

## 2024-08-27 RX ADMIN — PROMETHAZINE HYDROCHLORIDE 12.5 MG: 25 INJECTION INTRAMUSCULAR; INTRAVENOUS at 04:08

## 2024-08-27 RX ADMIN — MORPHINE SULFATE 2 MG: 2 INJECTION, SOLUTION INTRAMUSCULAR; INTRAVENOUS at 01:08

## 2024-08-27 RX ADMIN — ATORVASTATIN CALCIUM 40 MG: 40 TABLET, FILM COATED ORAL at 08:08

## 2024-08-27 RX ADMIN — LIDOCAINE HYDROCHLORIDE 50 MG: 20 INJECTION, SOLUTION INTRAVENOUS at 11:08

## 2024-08-27 RX ADMIN — HYDROCODONE BITARTRATE AND ACETAMINOPHEN 1 TABLET: 7.5; 325 TABLET ORAL at 08:08

## 2024-08-27 NOTE — INTERVAL H&P NOTE
The patient has been examined and the H&P has been reviewed:    I concur with the findings and no changes have occurred since H&P was written.    Surgery risks, benefits and alternative options discussed and understood by patient/family.          Active Hospital Problems    Diagnosis  POA    *Calculus of gallbladder with acute cholecystitis without obstruction [K80.00]  Yes      Resolved Hospital Problems   No resolved problems to display.

## 2024-08-27 NOTE — ANESTHESIA PROCEDURE NOTES
Intubation    Date/Time: 8/27/2024 11:15 AM    Performed by: Sagrario Siegel CRNA  Authorized by: Domingo Lares MD    Intubation:     Induction:  Intravenous    Intubated:  Postinduction    Mask Ventilation:  Easy mask    Attempts:  1    Attempted By:  CRNA    Method of Intubation:  Direct    Blade:  Vince 3    Laryngeal View Grade: Grade I - full view of cords      Difficult Airway Encountered?: No      Complications:  None    Airway Device:  Oral endotracheal tube    Airway Device Size:  7.0    Style/Cuff Inflation:  Cuffed    Inflation Amount (mL):  5    Tube secured:  21    Secured at:  The lips    Placement Verified By:  Capnometry    Complicating Factors:  None    Findings Post-Intubation:  BS equal bilateral and atraumatic/condition of teeth unchanged

## 2024-08-27 NOTE — PROGRESS NOTES
1220 RECEIVED TO RR AWAKE, ALERT. ORIENTATION GIVEN. FOLLOWS COMMANDS, NESTOR. HOB ELEVATED. O2 SAT WNL ON ROOM AIR. ABDOMEN SOFT WITH OP-SITES X4 WITH SCANT RED DRAINAGE NOTED, AREAS MARKED. SCD HOSE TO LOWER EXTREMITIES. IV INFUSING WELL LEFT FOREARM 20G. CATH. NO C/O PAIN. SEE FLOW SHEET.        1300 AWAKE, ALERT. STATES  PAIN LEVEL 3/10. SCANT DRAINAGE ON DRESSINGS, ABDOMEN SOFT. VOIDED X1. TRANSFERRED TO ROOM.

## 2024-08-27 NOTE — TRANSFER OF CARE
"Anesthesia Transfer of Care Note    Patient: Faviola King    Procedure(s) Performed: Procedure(s) (LRB):  CHOLECYSTECTOMY, LAPAROSCOPIC (N/A)    Patient location: PACU    Anesthesia Type: general    Transport from OR: Transported from OR on room air with adequate spontaneous ventilation    Post pain: adequate analgesia    Post assessment: no apparent anesthetic complications    Post vital signs: stable    Level of consciousness: responds to stimulation    Nausea/Vomiting: no nausea/vomiting    Complications: none    Transfer of care protocol was followed      Last vitals: Visit Vitals  BP (!) 150/95 (Patient Position: Lying)   Pulse 79   Temp 36.8 °C (98.2 °F) (Oral)   Resp 17   Ht 4' 11" (1.499 m)   Wt 70.9 kg (156 lb 4.9 oz)   SpO2 (!) 94%   BMI 31.57 kg/m²     "

## 2024-08-27 NOTE — PROGRESS NOTES
1320 ON ARRIVAL TO ROOM ASSISTED UP TO BATHROOM TO VOID. ON RETURNING TO BED BECAME NAUSEATED AND SPIT CLEAR SECRETIONS. V/S 163/98-82-16-99.1 TEMP-98% O2 SAT ON ROOM AIR. FAMILY AT BEDSIDE. RELEASED TO HERMINIA MCGRAW RN

## 2024-08-27 NOTE — PLAN OF CARE
[facility] - Periop Services  Initial Discharge Assessment       Primary Care Provider: Shantanu Larson MD    Admission Diagnosis: Cholelithiasis [K80.20]  Calculus of gallbladder with acute cholecystitis without obstruction [K80.00]    Admission Date: 8/26/2024  Expected Discharge Date:     Transition of Care Barriers: None    Payor: [employer] MANAGED MEDICARE / Plan: [employer] SNP HMO PPO SPECIAL NEEDS / Product Type: Medicare Advantage /     Extended Emergency Contact Information  Primary Emergency Contact: Chapis King  Mobile Phone: [phone]  Relation: Daughter  Preferred language: English   needed? No    Discharge Plan A: Home with family  Discharge Plan B: Home with family      [address]  [address] [facility] [place] [facility] [address]  Phone: [phone] Fax: [phone]      Initial Assessment (most recent)       Adult Discharge Assessment - 08/27/24 1152          Discharge Assessment    Assessment Type Discharge Planning Assessment     Source of Information family     People in Home grandchild(susie)     Do you expect to return to your current living situation? Yes     Do you have help at home or someone to help you manage your care at home? Yes     Who are your caregiver(s) and their phone number(s)? Chapis Rdz- Daughter [phone]     Prior to hospitilization cognitive status: Unable to Assess     Current cognitive status: Unable to Assess     Walking or Climbing Stairs Difficulty no     Dressing/Bathing Difficulty no     Home Accessibility stairs to enter home     Number of Stairs, Main Entrance other (see comments)   13    Equipment Currently Used at Home none     Readmission within 30 days? No     Patient currently being followed by outpatient case management? No     Do you currently have service(s) that help you manage your care at home? No     Do you take prescription medications? Yes     Do you have prescription  coverage? Yes     Coverage [employer] Man[employer] Medicare     Do you have any problems affording any of your prescribed medications? No     Is the patient taking medications as prescribed? yes     Who is going to help you get home at discharge? Daughter     How do you get to doctors appointments? car, drives self     Are you on dialysis? No     Do you take coumadin? No     Discharge Plan A Home with family     Discharge Plan B Home with family     DME Needed Upon Discharge  none     Discharge Plan discussed with: Adult children     Transition of Care Barriers None                   Pt in sx at time of initial dcp assessment. TRE spoke with pts daughterChapis in room. Pt lives at home with granddaughter, not current with hh and uses no dme. The plan is for pt to dc back home when medically ready for dc. I.M. not obtained due to pt being OP. SDOH questions completed 6 days ago. TRE will cont to follow for dc needs.

## 2024-08-27 NOTE — ANESTHESIA POSTPROCEDURE EVALUATION
Anesthesia Post Evaluation    Patient: Faviola King    Procedure(s) Performed: Procedure(s) (LRB):  CHOLECYSTECTOMY, LAPAROSCOPIC (N/A)    Final Anesthesia Type: general      Patient location during evaluation: PACU  Post-procedure vital signs: reviewed and stable  Pain management: adequate  Airway patency: patent    PONV status at discharge: No PONV  Anesthetic complications: no      Cardiovascular status: hemodynamically stable  Respiratory status: unassisted  Hydration status: euvolemic  Follow-up not needed.              Vitals Value Taken Time   /87 08/27/24 1300   Temp 36.8 °C (98.2 °F) 08/27/24 1226   Pulse 72 08/27/24 1300   Resp 18 08/27/24 1329   SpO2 97 % 08/27/24 1300   Vitals shown include unfiled device data.      Event Time   Out of Recovery 08/27/2024 13:00:00         Pain/Jerry Score: Pain Rating Prior to Med Admin: 10 (8/27/2024  1:29 PM)  Pain Rating Post Med Admin: 2 (8/26/2024  9:16 PM)  Jerry Score: 10 (8/27/2024  1:00 PM)

## 2024-08-27 NOTE — OP NOTE
[facility] - Periop Services  Surgery Department  Operative Note    SUMMARY     Date of Procedure: 8/27/2024     Procedure: Procedure(s) (LRB):  CHOLECYSTECTOMY, LAPAROSCOPIC (N/A)     Surgeons and Role:     * Rickey Santizo MD - Primary    Assisting Surgeon: None    Pre-Operative Diagnosis: Cholelithiasis [K80.20]    Post-Operative Diagnosis: Post-Op Diagnosis Codes:     * Cholelithiasis [K80.20]    Anesthesia: General    Procedures Performed: laparoscopic cholecystectomy    Significant Findings of the Procedure: mild omental adhesions to gallbladder    Procedure in Detail: After informed consent was obtained patient was brought to the OR and prepped and draped in the usual sterile fashion. We started off by optically inserting a 5 mm trocar at the umbilical area. Pneumoperitoneum was obtained up to 15 cm of mercury of pressure. We then under direct visualization placed an additional 11 mm port in the subxiphoid area and 2 additional 5 mm ports in the right upper quadrant. We then retracted the gallbladder over the liver and began dissecting out the cystic structures. The cystic duct and the artery were the only 2 structures entering the gallbladder and these were clipped twice distally once proximally and cut between. We then used the hook cautery to remove the gallbladder from the gallbladder fossa. No holes were made in the gallbladder nor the liver. We then used an Endo Catch bag to remove the gallbladder. We then inspected our bed and clips and they were all intact and hemostatic. We watched our ports come out.  We then discontinued pneumoperitoneum. The skin was closed with a 4-0 Monocryl in a subcuticular manner and patient tolerated the procedure well.      Complications: No    Estimated Blood Loss (EBL): 5 cc           Implants: * No implants in log *    Specimens:   Specimen (24h ago, onward)       Start     Ordered    08/27/24 1153  Surgical Pathology  RELEASE UPON ORDERING         08/27/24  1153                            Condition: Good    Disposition: PACU - hemodynamically stable.    Attestation: I was present and scrubbed for the entire procedure.

## 2024-08-27 NOTE — ANESTHESIA PREPROCEDURE EVALUATION
"                                                                                                             08/27/2024  Faviola King is a 64 y.o., female.      Pre-op Assessment    I have reviewed the Patient Summary Reports.    I have reviewed the NPO Status.   I have reviewed the Medications.     Review of Systems         Anesthesia Plan  Type of Anesthesia, risks & benefits discussed:    Anesthesia Type: Gen ETT  Intra-op Monitoring Plan: Standard ASA Monitors  Post Op Pain Control Plan: IV/PO Opioids PRN  Induction:  IV  Informed Consent: Informed consent signed with the Patient and all parties understand the risks and agree with anesthesia plan.  All questions answered.   ASA Score: 3    Ready For Surgery From Anesthesia Perspective.     .  NPO greater than 8 hours  No anesthetic complications  NKDA    Hct 46  K 3.2  10/24/23 PFT's: "Moderate obstructive ventilatory impairment     HTN  Diabetes mellitus  GERD  H/o MI  H/o lung cancer  Tobacco abuse  Osteoporosis  Hypokalemia    MP II; adequate ROM at neck    "

## 2024-08-27 NOTE — PROGRESS NOTES
[facility] - [facility] Telemetry  Wound Care    Patient Name:  Faviola King   MRN:  [id number]  Date: 8/27/2024  Diagnosis: Calculus of gallbladder with acute cholecystitis without obstruction    History:     Past Medical History:   Diagnosis Date    GERD (gastroesophageal reflux disease)     Heart attack 2017    Hypertension     Nodule of upper lobe of right lung     1.5 x 1.2 cm nodule RUL  1/12/23       Social History     Socioeconomic History    Marital status: Single   Tobacco Use    Smoking status: Some Days     Current packs/day: 1.00     Average packs/day: 1 pack/day for 40.0 years (40.0 ttl pk-yrs)     Types: Cigarettes    Smokeless tobacco: Never    Tobacco comments:     1 PPD smoking history  for ~ 40 years: over the last year cut back to ~ 1/2 PPD: recently to 1 or 2 cigarettes/day   Substance and Sexual Activity    Alcohol use: Never    Drug use: Yes     Types: Marijuana    Sexual activity: Not Currently     Social Determinants of Health     Financial Resource Strain: High Risk (8/21/2024)    Overall Financial Resource Strain (CARDIA)     Difficulty of Paying Living Expenses: Very hard   Food Insecurity: No Food Insecurity (8/21/2024)    Hunger Vital Sign     Worried About Running Out of Food in the Last Year: Never true     Ran Out of Food in the Last Year: Never true   Physical Activity: Sufficiently Active (8/21/2024)    Exercise Vital Sign     Days of Exercise per Week: 7 days     Minutes of Exercise per Session: 150+ min   Stress: No Stress Concern Present (8/21/2024)    [employer] [place] [employer] - Occupational Stress Questionnaire     Feeling of Stress : Not at all   Housing Stability: Unknown (8/21/2024)    Housing Stability Vital Sign     Unable to Pay for Housing in the Last Year: No       Precautions:     Allergies as of 08/26/2024    (No Known Allergies)       [facility] Assessment Details/Treatment   Narrative: Seen patient for initial preventative skin care measures.   Patient ambulates. No foam borders, open wounds, or redness noted to bilateral heels and sacral.  Consult wound care of any findings.      08/27/2024

## 2024-08-27 NOTE — PLAN OF CARE
Problem: Adult Inpatient Plan of Care  Goal: Plan of Care Review  Outcome: Progressing  Flowsheets (Taken 8/26/2024 2004)  Plan of Care Reviewed With: patient  Goal: Optimal Comfort and Wellbeing  Outcome: Progressing  Intervention: Monitor Pain and Promote Comfort  Flowsheets (Taken 8/26/2024 2004)  Pain Management Interventions:   pain management plan reviewed with patient/caregiver   pillow support provided   position adjusted   quiet environment facilitated   relaxation techniques promoted  Intervention: Provide Person-Centered Care  Flowsheets (Taken 8/26/2024 2004)  Trust Relationship/Rapport:   care explained   choices provided   emotional support provided   empathic listening provided   questions answered   questions encouraged   reassurance provided   thoughts/feelings acknowledged     Problem: Diabetes Comorbidity  Goal: Blood Glucose Level Within Targeted Range  Outcome: Progressing  Intervention: Monitor and Manage Glycemia  Flowsheets (Taken 8/26/2024 2004)  Glycemic Management:   blood glucose monitored   oral hydration promoted   supplemental insulin given

## 2024-08-28 PROBLEM — Z90.49 STATUS POST LAPAROSCOPIC CHOLECYSTECTOMY: Status: ACTIVE | Noted: 2024-08-28

## 2024-08-28 PROCEDURE — 94640 AIRWAY INHALATION TREATMENT: CPT | Mod: XB

## 2024-08-28 PROCEDURE — 94761 N-INVAS EAR/PLS OXIMETRY MLT: CPT

## 2024-08-28 PROCEDURE — 96366 THER/PROPH/DIAG IV INF ADDON: CPT

## 2024-08-28 PROCEDURE — 25000003 PHARM REV CODE 250: Performed by: SURGERY

## 2024-08-28 PROCEDURE — 63600175 PHARM REV CODE 636 W HCPCS: Performed by: SURGERY

## 2024-08-28 PROCEDURE — 25000003 PHARM REV CODE 250: Performed by: INTERNAL MEDICINE

## 2024-08-28 PROCEDURE — 63600175 PHARM REV CODE 636 W HCPCS

## 2024-08-28 PROCEDURE — 96376 TX/PRO/DX INJ SAME DRUG ADON: CPT

## 2024-08-28 PROCEDURE — 96372 THER/PROPH/DIAG INJ SC/IM: CPT | Performed by: SURGERY

## 2024-08-28 PROCEDURE — G0378 HOSPITAL OBSERVATION PER HR: HCPCS

## 2024-08-28 PROCEDURE — 96372 THER/PROPH/DIAG INJ SC/IM: CPT

## 2024-08-28 RX ADMIN — ONDANSETRON 8 MG: 4 TABLET, ORALLY DISINTEGRATING ORAL at 03:08

## 2024-08-28 RX ADMIN — HYDROCODONE BITARTRATE AND ACETAMINOPHEN 1 TABLET: 7.5; 325 TABLET ORAL at 11:08

## 2024-08-28 RX ADMIN — AMLODIPINE BESYLATE 10 MG: 10 TABLET ORAL at 08:08

## 2024-08-28 RX ADMIN — POTASSIUM CHLORIDE, DEXTROSE MONOHYDRATE AND SODIUM CHLORIDE: 150; 5; 450 INJECTION, SOLUTION INTRAVENOUS at 03:08

## 2024-08-28 RX ADMIN — CARVEDILOL 6.25 MG: 6.25 TABLET, FILM COATED ORAL at 08:08

## 2024-08-28 RX ADMIN — HYDROCODONE BITARTRATE AND ACETAMINOPHEN 1 TABLET: 7.5; 325 TABLET ORAL at 06:08

## 2024-08-28 RX ADMIN — TRAZODONE HYDROCHLORIDE 25 MG: 50 TABLET ORAL at 08:08

## 2024-08-28 RX ADMIN — POTASSIUM CHLORIDE, DEXTROSE MONOHYDRATE AND SODIUM CHLORIDE: 150; 5; 450 INJECTION, SOLUTION INTRAVENOUS at 10:08

## 2024-08-28 RX ADMIN — ENOXAPARIN SODIUM 40 MG: 40 INJECTION SUBCUTANEOUS at 05:08

## 2024-08-28 RX ADMIN — ALBUTEROL SULFATE 2 PUFF: 108 INHALANT RESPIRATORY (INHALATION) at 08:08

## 2024-08-28 RX ADMIN — MORPHINE SULFATE 2 MG: 2 INJECTION, SOLUTION INTRAMUSCULAR; INTRAVENOUS at 03:08

## 2024-08-28 RX ADMIN — ALBUTEROL SULFATE 2 PUFF: 108 INHALANT RESPIRATORY (INHALATION) at 07:08

## 2024-08-28 RX ADMIN — ATORVASTATIN CALCIUM 40 MG: 40 TABLET, FILM COATED ORAL at 08:08

## 2024-08-28 RX ADMIN — PROMETHAZINE HYDROCHLORIDE 12.5 MG: 25 INJECTION INTRAMUSCULAR; INTRAVENOUS at 08:08

## 2024-08-28 RX ADMIN — POTASSIUM CHLORIDE, DEXTROSE MONOHYDRATE AND SODIUM CHLORIDE: 150; 5; 450 INJECTION, SOLUTION INTRAVENOUS at 02:08

## 2024-08-28 NOTE — SUBJECTIVE & OBJECTIVE
Interval History:   Patient kept overnight due to increased epigastric pain and nausea.  No vomiting.  Pain and nausea persists this morning.    Plan to control pain, advance diet slowly as tolerated, and mobilize the patient.    Medications:  Continuous Infusions:   dextrose 5 % and 0.45 % NaCl with KCl 20 mEq   Intravenous Continuous 125 mL/hr at 08/28/24 0318 New Bag at 08/28/24 0318     Scheduled Meds:   albuterol  2 puff Inhalation BID    amLODIPine  10 mg Oral Daily    atorvastatin  40 mg Oral Daily    carvediloL  6.25 mg Oral BID    enoxparin  40 mg Subcutaneous Daily     PRN Meds:  Current Facility-Administered Medications:     acetaminophen, 650 mg, Oral, Q8H PRN    dextrose 10%, 12.5 g, Intravenous, PRN    dextrose 10%, 25 g, Intravenous, PRN    HYDROcodone-acetaminophen, 1 tablet, Oral, Q6H PRN    melatonin, 6 mg, Oral, Nightly PRN    morphine, 2 mg, Intravenous, Q4H PRN    morphine, 4 mg, Intravenous, Q4H PRN    ondansetron, 8 mg, Oral, Q8H PRN    promethazine, 12.5 mg, Intramuscular, Q6H PRN     Review of patient's allergies indicates:  No Known Allergies  Objective:     Vital Signs (Most Recent):  Temp: 98.8 °F (37.1 °C) (08/28/24 1136)  Pulse: 92 (08/28/24 1136)  Resp: 18 (08/28/24 1136)  BP: (!) 152/90 (08/28/24 1136)  SpO2: 97 % (08/28/24 1136) Vital Signs (24h Range):  Temp:  [98.2 °F (36.8 °C)-98.8 °F (37.1 °C)] 98.8 °F (37.1 °C)  Pulse:  [] 92  Resp:  [14-18] 18  SpO2:  [92 %-97 %] 97 %  BP: (113-171)/() 152/90     Weight: 70.9 kg (156 lb 4.9 oz)  Body mass index is 31.57 kg/m².    Intake/Output - Last 3 Shifts         08/26 0700  08/27 0659 08/27 0700  08/28 0659 08/28 0700  08/29 0659    I.V. (mL/kg)  12.5 (0.2)     IV Piggyback 1100 500     Total Intake(mL/kg) 1100 (15.5) 512.5 (7.2)     Urine (mL/kg/hr)  1600 (0.9)     Total Output  1600     Net +1100 -1087.5            Urine Occurrence 2 x               Physical Exam  Vitals reviewed.   Constitutional:       General: She is not  in acute distress.  Cardiovascular:      Rate and Rhythm: Normal rate.   Pulmonary:      Effort: Pulmonary effort is normal. No respiratory distress.   Abdominal:      General: There is no distension.      Palpations: Abdomen is soft.      Tenderness: There is abdominal tenderness (upper abdomen, appropriate postoperatively).      Comments: Surgical bandages overlying laparoscopic incisions clean, dry, intact with small sanguinous shadowing   Skin:     General: Skin is warm and dry.   Neurological:      Mental Status: She is alert. Mental status is at baseline.          Significant Labs:  I have reviewed all pertinent lab results within the past 24 hours.    Significant Diagnostics:  I have reviewed all pertinent imaging results/findings within the past 24 hours.

## 2024-08-28 NOTE — PLAN OF CARE
Problem: Adult Inpatient Plan of Care  Goal: Plan of Care Review  Outcome: Progressing  Goal: Patient-Specific Goal (Individualized)  Outcome: Progressing  Goal: Absence of Hospital-Acquired Illness or Injury  Outcome: Progressing  Goal: Optimal Comfort and Wellbeing  Outcome: Progressing  Goal: Readiness for Transition of Care  Outcome: Progressing     Problem: Diabetes Comorbidity  Goal: Blood Glucose Level Within Targeted Range  Outcome: Progressing  Intervention: Monitor and Manage Glycemia  Flowsheets (Taken 8/28/2024 0331)  Glycemic Management: blood glucose monitored     Problem: Wound  Goal: Optimal Coping  Outcome: Progressing  Goal: Optimal Functional Ability  Outcome: Progressing  Goal: Absence of Infection Signs and Symptoms  Outcome: Progressing  Goal: Improved Oral Intake  Outcome: Progressing  Goal: Optimal Pain Control and Function  Outcome: Progressing  Intervention: Prevent or Manage Pain  Flowsheets (Taken 8/28/2024 0331)  Sleep/Rest Enhancement: awakenings minimized  Pain Management Interventions:   medication offered   position adjusted  Goal: Skin Health and Integrity  Outcome: Progressing  Goal: Optimal Wound Healing  Outcome: Progressing

## 2024-08-28 NOTE — PROGRESS NOTES
[facility] - [address] [place] [address]  General Surgery  Progress Note    Subjective:     History of Present Illness:  No notes on file    Post-Op Info:  Procedure(s) (LRB):  CHOLECYSTECTOMY, LAPAROSCOPIC (N/A)   1 Day Post-Op     Interval History:   Patient kept overnight due to increased epigastric pain and nausea.  No vomiting.  Pain and nausea persists this morning.    Plan to control pain, advance diet slowly as tolerated, and mobilize the patient.    Medications:  Continuous Infusions:   dextrose 5 % and 0.45 % NaCl with KCl 20 mEq   Intravenous Continuous 125 mL/hr at 08/28/24 0318 New Bag at 08/28/24 0318     Scheduled Meds:   albuterol  2 puff Inhalation BID    amLODIPine  10 mg Oral Daily    atorvastatin  40 mg Oral Daily    carvediloL  6.25 mg Oral BID    enoxparin  40 mg Subcutaneous Daily     PRN Meds:  Current Facility-Administered Medications:     acetaminophen, 650 mg, Oral, Q8H PRN    dextrose 10%, 12.5 g, Intravenous, PRN    dextrose 10%, 25 g, Intravenous, PRN    HYDROcodone-acetaminophen, 1 tablet, Oral, Q6H PRN    melatonin, 6 mg, Oral, Nightly PRN    morphine, 2 mg, Intravenous, Q4H PRN    morphine, 4 mg, Intravenous, Q4H PRN    ondansetron, 8 mg, Oral, Q8H PRN    promethazine, 12.5 mg, Intramuscular, Q6H PRN     Review of patient's allergies indicates:  No Known Allergies  Objective:     Vital Signs (Most Recent):  Temp: 98.8 °F (37.1 °C) (08/28/24 1136)  Pulse: 92 (08/28/24 1136)  Resp: 18 (08/28/24 1136)  BP: (!) 152/90 (08/28/24 1136)  SpO2: 97 % (08/28/24 1136) Vital Signs (24h Range):  Temp:  [98.2 °F (36.8 °C)-98.8 °F (37.1 °C)] 98.8 °F (37.1 °C)  Pulse:  [] 92  Resp:  [14-18] 18  SpO2:  [92 %-97 %] 97 %  BP: (113-171)/() 152/90     Weight: 70.9 kg (156 lb 4.9 oz)  Body mass index is 31.57 kg/m².    Intake/Output - Last 3 Shifts         08/26 0700 08/27 0659 08/27 0700 08/28 0659 08/28 0700 08/29 0659    I.V. (mL/kg)  12.5 (0.2)     IV Piggyback 1100 500      Total Intake(mL/kg) 1100 (15.5) 512.5 (7.2)     Urine (mL/kg/hr)  1600 (0.9)     Total Output  1600     Net +1100 -1087.5            Urine Occurrence 2 x               Physical Exam  Vitals reviewed.   Constitutional:       General: She is not in acute distress.  Cardiovascular:      Rate and Rhythm: Normal rate.   Pulmonary:      Effort: Pulmonary effort is normal. No respiratory distress.   Abdominal:      General: There is no distension.      Palpations: Abdomen is soft.      Tenderness: There is abdominal tenderness (upper abdomen, appropriate postoperatively).      Comments: Surgical bandages overlying laparoscopic incisions clean, dry, intact with small sanguinous shadowing   Skin:     General: Skin is warm and dry.   Neurological:      Mental Status: She is alert. Mental status is at baseline.          Significant Labs:  I have reviewed all pertinent lab results within the past 24 hours.    Significant Diagnostics:  I have reviewed all pertinent imaging results/findings within the past 24 hours.  Assessment/Plan:     No notes have been filed under this hospital service.  Service: General Surgery      Lázaro Live, MIKE  General Surgery  [facility] - [address] [place] [address]

## 2024-08-29 VITALS
HEIGHT: 59 IN | BODY MASS INDEX: 31.51 KG/M2 | TEMPERATURE: 98 F | HEART RATE: 95 BPM | DIASTOLIC BLOOD PRESSURE: 89 MMHG | SYSTOLIC BLOOD PRESSURE: 125 MMHG | OXYGEN SATURATION: 96 % | RESPIRATION RATE: 18 BRPM | WEIGHT: 156.31 LBS

## 2024-08-29 LAB
ALBUMIN SERPL BCP-MCNC: 4.1 G/DL (ref 3.5–5)
ALBUMIN/GLOB SERPL: 0.9 {RATIO}
ALP SERPL-CCNC: 86 U/L (ref 55–142)
ALT SERPL W P-5'-P-CCNC: 39 U/L (ref 13–56)
ANION GAP SERPL CALCULATED.3IONS-SCNC: 9 MMOL/L (ref 7–16)
AST SERPL W P-5'-P-CCNC: 30 U/L (ref 15–37)
BASOPHILS # BLD AUTO: 0.03 K/UL (ref 0–0.2)
BASOPHILS NFR BLD AUTO: 0.3 % (ref 0–1)
BILIRUB SERPL-MCNC: 0.5 MG/DL (ref ?–1.2)
BUN SERPL-MCNC: 8 MG/DL (ref 7–18)
BUN/CREAT SERPL: 10 (ref 6–20)
CALCIUM SERPL-MCNC: 9.8 MG/DL (ref 8.5–10.1)
CHLORIDE SERPL-SCNC: 97 MMOL/L (ref 98–107)
CO2 SERPL-SCNC: 28 MMOL/L (ref 21–32)
CREAT SERPL-MCNC: 0.79 MG/DL (ref 0.55–1.02)
DIFFERENTIAL METHOD BLD: ABNORMAL
EGFR (NO RACE VARIABLE) (RUSH/TITUS): 83 ML/MIN/1.73M2
EOSINOPHIL # BLD AUTO: 0.01 K/UL (ref 0–0.5)
EOSINOPHIL NFR BLD AUTO: 0.1 % (ref 1–4)
ERYTHROCYTE [DISTWIDTH] IN BLOOD BY AUTOMATED COUNT: 13.8 % (ref 11.5–14.5)
GLOBULIN SER-MCNC: 4.5 G/DL (ref 2–4)
GLUCOSE SERPL-MCNC: 119 MG/DL (ref 74–106)
HCT VFR BLD AUTO: 50 % (ref 38–47)
HGB BLD-MCNC: 16.8 G/DL (ref 12–16)
IMM GRANULOCYTES # BLD AUTO: 0.03 K/UL (ref 0–0.04)
IMM GRANULOCYTES NFR BLD: 0.3 % (ref 0–0.4)
LYMPHOCYTES # BLD AUTO: 2.72 K/UL (ref 1–4.8)
LYMPHOCYTES NFR BLD AUTO: 27.9 % (ref 27–41)
MCH RBC QN AUTO: 29.3 PG (ref 27–31)
MCHC RBC AUTO-ENTMCNC: 33.6 G/DL (ref 32–36)
MCV RBC AUTO: 87.1 FL (ref 80–96)
MONOCYTES # BLD AUTO: 1.03 K/UL (ref 0–0.8)
MONOCYTES NFR BLD AUTO: 10.6 % (ref 2–6)
MPC BLD CALC-MCNC: 9.8 FL (ref 9.4–12.4)
NEUTROPHILS # BLD AUTO: 5.93 K/UL (ref 1.8–7.7)
NEUTROPHILS NFR BLD AUTO: 60.8 % (ref 53–65)
NRBC # BLD AUTO: 0 X10E3/UL
NRBC, AUTO (.00): 0 %
PLATELET # BLD AUTO: 276 K/UL (ref 150–400)
POTASSIUM SERPL-SCNC: 3.1 MMOL/L (ref 3.5–5.1)
PROT SERPL-MCNC: 8.6 G/DL (ref 6.4–8.2)
RBC # BLD AUTO: 5.74 M/UL (ref 4.2–5.4)
SODIUM SERPL-SCNC: 131 MMOL/L (ref 136–145)
WBC # BLD AUTO: 9.75 K/UL (ref 4.5–11)

## 2024-08-29 PROCEDURE — 63600175 PHARM REV CODE 636 W HCPCS: Performed by: SURGERY

## 2024-08-29 PROCEDURE — 96366 THER/PROPH/DIAG IV INF ADDON: CPT

## 2024-08-29 PROCEDURE — 0FT44ZZ RESECTION OF GALLBLADDER, PERCUTANEOUS ENDOSCOPIC APPROACH: ICD-10-PCS | Performed by: SURGERY

## 2024-08-29 PROCEDURE — 80053 COMPREHEN METABOLIC PANEL: CPT | Performed by: SURGERY

## 2024-08-29 PROCEDURE — 36415 COLL VENOUS BLD VENIPUNCTURE: CPT | Performed by: SURGERY

## 2024-08-29 PROCEDURE — 99900035 HC TECH TIME PER 15 MIN (STAT)

## 2024-08-29 PROCEDURE — 25000003 PHARM REV CODE 250: Performed by: NURSE PRACTITIONER

## 2024-08-29 PROCEDURE — 94640 AIRWAY INHALATION TREATMENT: CPT

## 2024-08-29 PROCEDURE — 94761 N-INVAS EAR/PLS OXIMETRY MLT: CPT

## 2024-08-29 PROCEDURE — 97161 PT EVAL LOW COMPLEX 20 MIN: CPT

## 2024-08-29 PROCEDURE — G0378 HOSPITAL OBSERVATION PER HR: HCPCS

## 2024-08-29 PROCEDURE — 96372 THER/PROPH/DIAG INJ SC/IM: CPT

## 2024-08-29 PROCEDURE — 85025 COMPLETE CBC W/AUTO DIFF WBC: CPT | Performed by: SURGERY

## 2024-08-29 PROCEDURE — 11000001 HC ACUTE MED/SURG PRIVATE ROOM

## 2024-08-29 PROCEDURE — 25000003 PHARM REV CODE 250: Performed by: SURGERY

## 2024-08-29 PROCEDURE — 63600175 PHARM REV CODE 636 W HCPCS

## 2024-08-29 RX ORDER — ONDANSETRON 4 MG/1
4 TABLET, ORALLY DISINTEGRATING ORAL EVERY 6 HOURS PRN
Qty: 20 TABLET | Refills: 0 | Status: SHIPPED | OUTPATIENT
Start: 2024-08-29

## 2024-08-29 RX ORDER — SCOLOPAMINE TRANSDERMAL SYSTEM 1 MG/1
1 PATCH, EXTENDED RELEASE TRANSDERMAL
Status: DISCONTINUED | OUTPATIENT
Start: 2024-08-29 | End: 2024-08-29 | Stop reason: HOSPADM

## 2024-08-29 RX ORDER — CHOLESTYRAMINE 4 G/9G
4 POWDER, FOR SUSPENSION ORAL
Qty: 90 PACKET | Refills: 1 | Status: SHIPPED | OUTPATIENT
Start: 2024-08-29 | End: 2025-08-29

## 2024-08-29 RX ORDER — SCOLOPAMINE TRANSDERMAL SYSTEM 1 MG/1
1 PATCH, EXTENDED RELEASE TRANSDERMAL
Qty: 10 PATCH | Refills: 0 | Status: SHIPPED | OUTPATIENT
Start: 2024-08-29

## 2024-08-29 RX ORDER — HYDROCODONE BITARTRATE AND ACETAMINOPHEN 7.5; 325 MG/1; MG/1
1 TABLET ORAL EVERY 6 HOURS PRN
Status: DISCONTINUED | OUTPATIENT
Start: 2024-08-29 | End: 2024-08-29 | Stop reason: HOSPADM

## 2024-08-29 RX ADMIN — POTASSIUM CHLORIDE, DEXTROSE MONOHYDRATE AND SODIUM CHLORIDE: 150; 5; 450 INJECTION, SOLUTION INTRAVENOUS at 06:08

## 2024-08-29 RX ADMIN — CARVEDILOL 6.25 MG: 6.25 TABLET, FILM COATED ORAL at 08:08

## 2024-08-29 RX ADMIN — PROMETHAZINE HYDROCHLORIDE 12.5 MG: 25 INJECTION INTRAMUSCULAR; INTRAVENOUS at 08:08

## 2024-08-29 RX ADMIN — AMLODIPINE BESYLATE 10 MG: 10 TABLET ORAL at 08:08

## 2024-08-29 RX ADMIN — SCOPOLAMINE 1 PATCH: 1.5 PATCH, EXTENDED RELEASE TRANSDERMAL at 08:08

## 2024-08-29 RX ADMIN — ATORVASTATIN CALCIUM 40 MG: 40 TABLET, FILM COATED ORAL at 08:08

## 2024-08-29 RX ADMIN — HYDROCODONE BITARTRATE AND ACETAMINOPHEN 1 TABLET: 7.5; 325 TABLET ORAL at 03:08

## 2024-08-29 RX ADMIN — ALBUTEROL SULFATE 2 PUFF: 108 INHALANT RESPIRATORY (INHALATION) at 08:08

## 2024-08-29 RX ADMIN — HYDROCODONE BITARTRATE AND ACETAMINOPHEN 1 TABLET: 7.5; 325 TABLET ORAL at 08:08

## 2024-08-29 NOTE — PT/OT/SLP EVAL
Physical Therapy Evaluation and Discharge Note    Patient Name:  Faviola King   MRN:  [id number]    Recommendations:     Discharge Recommendations: No Therapy Indicated  Discharge Equipment Recommendations: none   Barriers to discharge: None    Assessment:     Faviola King is a 64 y.o. female admitted with a medical diagnosis of Calculus of gallbladder with acute cholecystitis without obstruction. .  At this time, patient is functioning at their prior level of function and does not require further acute PT services.     Recent Surgery: Procedure(s) (LRB):  CHOLECYSTECTOMY, LAPAROSCOPIC (N/A) 2 Days Post-Op    Plan:     During this hospitalization, patient does not require further acute PT services.  Please re-consult if situation changes.      Subjective     Chief Complaint: s/p laparoscopic cholecystectomy  Patient/Family Comments/goals: agreeable  Pain/Comfort:  Pain Rating 1: 0/10    Patients cultural, spiritual, [employer]ology conflicts given the current situation: no    Living Environment:  Pt lives home with her granddaughter in a 1 story home with 13 steps to enter.   Prior to admission, patients level of function was indep, driving, working.  Equipment used at home: none.  DME owned (not currently used): none.  Upon discharge, patient will have assistance from self and family.    Objective:     Communicated with RN prior to session.  Patient found HOB elevated with peripheral IV upon PT entry to room.    General Precautions: Standard, fall    Orthopedic Precautions:N/A   Braces: N/A  Respiratory Status: Room air    Exams:  Cognitive Exam:  Patient is oriented to Person, Place, Time, and Situation  Sensation: -       Intact  RLE ROM: WNL  RLE Strength: WNL  LLE ROM: WNL  LLE Strength: WNL    Functional Mobility:  Bed Mobility:     Supine to Sit: modified independence  Transfers:     Sit to Stand:  modified independence with no AD  Gait: 50ft x 2 with no AD, modified indep d/t slow dayanara   Balance: Good- in stance    Stairs:  Pt ascended/descended 11 stair(s) with No Assistive Device with right handrail with Supervision or Set-up Assistance.     AM-PAC 6 CLICK MOBILITY  Total Score:24       Treatment and Education:  PT eval completed. Pt is indep in bed mobility, transfers, gait training and stair negotiation. No further PT warranted.    AM-PAC 6 CLICK MOBILITY  Total Score:24     Patient left up in chair with all lines intact and call button in reach.    GOALS:   Multidisciplinary Problems       Physical Therapy Goals       Not on file              Multidisciplinary Problems (Resolved)          Problem: Physical Therapy    Goal Priority Disciplines Outcome Goal Variances Interventions   Physical Therapy Goal   (Resolved)     PT, PT/OT Met     Description: PT eval completed. Pt is indep in bed mobility, transfers, gait training and stair negotiation. No further PT warranted.                        History:     Past Medical History:   Diagnosis Date    GERD (gastroesophageal reflux disease)     Heart attack     Hypertension     Nodule of upper lobe of right lung     1.5 x 1.2 cm nodule RUL  23       Past Surgical History:   Procedure Laterality Date     SECTION      HYSTERECTOMY      LAPAROSCOPIC CHOLECYSTECTOMY N/A 2024    Procedure: CHOLECYSTECTOMY, LAPAROSCOPIC;  Surgeon: Rickey Santizo MD;  Location: [facility];  Service: General;  Laterality: N/A;       Time Tracking:     PT Received On: 24  PT Start Time: 1341     PT Stop Time: 1406  PT Total Time (min): 25 min     Billable Minutes: Evaluation 15 and Gait Training 10      2024

## 2024-08-29 NOTE — PLAN OF CARE
Problem: Adult Inpatient Plan of Care  Goal: Plan of Care Review  Outcome: Met  Goal: Patient-Specific Goal (Individualized)  Outcome: Met  Goal: Absence of Hospital-Acquired Illness or Injury  Outcome: Met  Goal: Optimal Comfort and Wellbeing  Outcome: Met  Goal: Readiness for Transition of Care  Outcome: Met   Pt met all criterion and is ready for d/c to home.

## 2024-08-29 NOTE — DISCHARGE INSTRUCTIONS
Keep all follow up appointments as directed. Continue meds as directed. Return to ED for anty emergent needs or or call your providers office for any fever >101, redness, drainage or foul smelling discharge from wound. May shower use a separate washcloth for incision area than you use on the rest of your body.  No tub soaks. No lifting more than 10 lbs until approved by your surgeon. Thanks for letting [facility] provide your care.

## 2024-08-29 NOTE — RESPIRATORY THERAPY
MDI treatment given by RT student, Angeline Moya.     08/29/24 0800   Patient Assessment/Suction   Level of Consciousness (AVPU) responds to voice   Respiratory Effort Unlabored   Expansion/Accessory Muscles/Retractions no retractions;no use of accessory muscles   All Lung Fields Breath Sounds Anterior:;Lateral:;equal bilaterally;coarse  (slight)   Rhythm/Pattern, Respiratory unlabored;pattern regular;depth regular;no shortness of breath reported   Cough Frequency no cough   PRE-TX-O2   Device (Oxygen Therapy) room air   SpO2 96 %   Pulse Oximetry Type Intermittent   $ Pulse Oximetry - Multiple Charge Pulse Oximetry - Multiple   Pulse 96   Resp 16   Positioning   Body Position position changed independently   Head of Bed (HOB) Positioning HOB elevated   Positioning/Transfer Devices pillows;in use   Inhaler   $ Inhaler Charges MDI (Metered Dose Inahler) Treatment;Given With Spacer   Daily Review of Necessity (Inhaler) completed   Respiratory Treatment Status (Inhaler) given   Treatment Route (Inhaler) mouthpiece utilized;spacer/holding chamber   Patient Position HOB elevated   Post Treatment Assessment (Inhaler) breath sounds unchanged   Signs of Intolerance (Inhaler) none   Breath Sounds Post-Respiratory Treatment   Throughout All Fields Post-Treatment All Fields   Throughout All Fields Post-Treatment Anterior:;Lateral:;no change   Post-treatment Heart Rate (beats/min) 96   Post-treatment Resp Rate (breaths/min) 16   Respiratory Evaluation   $ Care Plan Tech Time 15 min

## 2024-08-29 NOTE — SUBJECTIVE & OBJECTIVE
Interval History:   Labs and vital signs stable with the exception of sodium 131, potassium 3.1.  Continue IV fluids, add p.o. potassium.      Patient is still having nausea and vomiting overnight.  Improved with scopolamine, but persists.  Will consult Gastroenterology.    Medications:  Continuous Infusions:   dextrose 5 % and 0.45 % NaCl with KCl 20 mEq   Intravenous Continuous 125 mL/hr at 08/29/24 0638 New Bag at 08/29/24 0638     Scheduled Meds:   albuterol  2 puff Inhalation BID    amLODIPine  10 mg Oral Daily    atorvastatin  40 mg Oral Daily    carvediloL  6.25 mg Oral BID    enoxparin  40 mg Subcutaneous Daily    scopolamine  1 patch Transdermal Q3 Days    traZODone  25 mg Oral QHS     PRN Meds:  Current Facility-Administered Medications:     acetaminophen, 650 mg, Oral, Q8H PRN    dextrose 10%, 12.5 g, Intravenous, PRN    dextrose 10%, 25 g, Intravenous, PRN    HYDROcodone-acetaminophen, 1 tablet, Oral, Q6H PRN    melatonin, 6 mg, Oral, Nightly PRN    morphine, 2 mg, Intravenous, Q4H PRN    morphine, 4 mg, Intravenous, Q4H PRN    ondansetron, 8 mg, Oral, Q8H PRN    promethazine, 12.5 mg, Intramuscular, Q6H PRN     Review of patient's allergies indicates:  No Known Allergies  Objective:     Vital Signs (Most Recent):  Temp: 98.1 °F (36.7 °C) (08/29/24 1136)  Pulse: 82 (08/29/24 1136)  Resp: 16 (08/29/24 1136)  BP: (!) 152/99 (08/29/24 1136)  SpO2: 97 % (08/29/24 1136) Vital Signs (24h Range):  Temp:  [98.1 °F (36.7 °C)-99.1 °F (37.3 °C)] 98.1 °F (36.7 °C)  Pulse:  [82-96] 82  Resp:  [16-20] 16  SpO2:  [95 %-98 %] 97 %  BP: (121-152)/(79-99) 152/99     Weight: 70.9 kg (156 lb 4.9 oz)  Body mass index is 31.57 kg/m².    Intake/Output - Last 3 Shifts         08/27 0700 08/28 0659 08/28 0700 08/29 0659 08/29 0700 08/30 0659    I.V. (mL/kg) 12.5 (0.2)      IV Piggyback 500      Total Intake(mL/kg) 512.5 (7.2)      Urine (mL/kg/hr) 1600 (0.9)      Total Output 1600      Net -1087.5             Urine Occurrence   3 x              Physical Exam  Vitals reviewed.   Constitutional:       General: She is not in acute distress.  Cardiovascular:      Rate and Rhythm: Normal rate.   Pulmonary:      Effort: Pulmonary effort is normal. No respiratory distress.   Abdominal:      General: There is no distension.      Palpations: Abdomen is soft.      Tenderness: There is abdominal tenderness (upper abdomen, appropriate postoperatively).      Comments: Surgical bandages overlying laparoscopic incisions clean, dry, intact with small sanguinous shadowing   Skin:     General: Skin is warm and dry.   Neurological:      Mental Status: She is alert. Mental status is at baseline.          Significant Labs:  I have reviewed all pertinent lab results within the past 24 hours.    Significant Diagnostics:  I have reviewed all pertinent imaging results/findings within the past 24 hours.

## 2024-08-29 NOTE — SUBJECTIVE & OBJECTIVE
Interval History:   Labs and vital signs stable. Patient with nausea and vomiting overnight.  Improved with scopolamine.  If tolerating diet without nausea/vomiting and pain controlled, will discharge home later today.    Medications:  Continuous Infusions:   dextrose 5 % and 0.45 % NaCl with KCl 20 mEq   Intravenous Continuous 125 mL/hr at 08/29/24 0638 New Bag at 08/29/24 0638     Scheduled Meds:   albuterol  2 puff Inhalation BID    amLODIPine  10 mg Oral Daily    atorvastatin  40 mg Oral Daily    carvediloL  6.25 mg Oral BID    enoxparin  40 mg Subcutaneous Daily    scopolamine  1 patch Transdermal Q3 Days    traZODone  25 mg Oral QHS     PRN Meds:  Current Facility-Administered Medications:     acetaminophen, 650 mg, Oral, Q8H PRN    dextrose 10%, 12.5 g, Intravenous, PRN    dextrose 10%, 25 g, Intravenous, PRN    HYDROcodone-acetaminophen, 1 tablet, Oral, Q6H PRN    melatonin, 6 mg, Oral, Nightly PRN    morphine, 2 mg, Intravenous, Q4H PRN    morphine, 4 mg, Intravenous, Q4H PRN    ondansetron, 8 mg, Oral, Q8H PRN    promethazine, 12.5 mg, Intramuscular, Q6H PRN     Review of patient's allergies indicates:  No Known Allergies  Objective:     Vital Signs (Most Recent):  Temp: 98.2 °F (36.8 °C) (08/29/24 1546)  Pulse: 95 (08/29/24 1546)  Resp: 18 (08/29/24 1556)  BP: 125/89 (08/29/24 1546)  SpO2: 96 % (08/29/24 1546) Vital Signs (24h Range):  Temp:  [98.1 °F (36.7 °C)-99.1 °F (37.3 °C)] 98.2 °F (36.8 °C)  Pulse:  [82-96] 95  Resp:  [16-20] 18  SpO2:  [95 %-98 %] 96 %  BP: (121-152)/(79-99) 125/89     Weight: 70.9 kg (156 lb 4.9 oz)  Body mass index is 31.57 kg/m².    Intake/Output - Last 3 Shifts         08/27 0700 08/28 0659 08/28 0700 08/29 0659 08/29 0700 08/30 0659    I.V. (mL/kg) 12.5 (0.2)      IV Piggyback 500      Total Intake(mL/kg) 512.5 (7.2)      Urine (mL/kg/hr) 1600 (0.9)      Total Output 1600      Net -1087.5             Urine Occurrence  3 x              Physical Exam  Vitals reviewed.    Constitutional:       General: She is not in acute distress.  Cardiovascular:      Rate and Rhythm: Normal rate.   Pulmonary:      Effort: Pulmonary effort is normal. No respiratory distress.   Abdominal:      General: There is no distension.      Palpations: Abdomen is soft.      Tenderness: There is abdominal tenderness (upper abdomen, appropriate postoperatively).      Comments: Surgical bandages overlying laparoscopic incisions clean, dry, intact with small sanguinous shadowing   Skin:     General: Skin is warm and dry.   Neurological:      Mental Status: She is alert. Mental status is at baseline.          Significant Labs:  I have reviewed all pertinent lab results within the past 24 hours.    Significant Diagnostics:  I have reviewed all pertinent imaging results/findings within the past 24 hours.

## 2024-08-29 NOTE — PLAN OF CARE
Problem: Physical Therapy  Goal: Physical Therapy Goal  Description: PT eval completed. Pt is indep in bed mobility, transfers, gait training and stair negotiation. No further PT warranted.   Outcome: Met

## 2024-08-29 NOTE — NURSING
Discharge instructions given written and verbally to patient and family at bedside. All voiced understanding. Will wheel out to family's car via wheelchair.

## 2024-08-29 NOTE — PROGRESS NOTES
[facility] - [address] [place] [address]  General Surgery  Progress Note    Subjective:     History of Present Illness:  No notes on file    Post-Op Info:  Procedure(s) (LRB):  CHOLECYSTECTOMY, LAPAROSCOPIC (N/A)   2 Days Post-Op     Interval History:   Labs and vital signs stable. Patient with nausea and vomiting overnight.  Improved with scopolamine.  If tolerating diet without nausea/vomiting and pain controlled, will discharge home later today.    Medications:  Continuous Infusions:   dextrose 5 % and 0.45 % NaCl with KCl 20 mEq   Intravenous Continuous 125 mL/hr at 08/29/24 0638 New Bag at 08/29/24 0638     Scheduled Meds:   albuterol  2 puff Inhalation BID    amLODIPine  10 mg Oral Daily    atorvastatin  40 mg Oral Daily    carvediloL  6.25 mg Oral BID    enoxparin  40 mg Subcutaneous Daily    scopolamine  1 patch Transdermal Q3 Days    traZODone  25 mg Oral QHS     PRN Meds:  Current Facility-Administered Medications:     acetaminophen, 650 mg, Oral, Q8H PRN    dextrose 10%, 12.5 g, Intravenous, PRN    dextrose 10%, 25 g, Intravenous, PRN    HYDROcodone-acetaminophen, 1 tablet, Oral, Q6H PRN    melatonin, 6 mg, Oral, Nightly PRN    morphine, 2 mg, Intravenous, Q4H PRN    morphine, 4 mg, Intravenous, Q4H PRN    ondansetron, 8 mg, Oral, Q8H PRN    promethazine, 12.5 mg, Intramuscular, Q6H PRN     Review of patient's allergies indicates:  No Known Allergies  Objective:     Vital Signs (Most Recent):  Temp: 98.2 °F (36.8 °C) (08/29/24 1546)  Pulse: 95 (08/29/24 1546)  Resp: 18 (08/29/24 1556)  BP: 125/89 (08/29/24 1546)  SpO2: 96 % (08/29/24 1546) Vital Signs (24h Range):  Temp:  [98.1 °F (36.7 °C)-99.1 °F (37.3 °C)] 98.2 °F (36.8 °C)  Pulse:  [82-96] 95  Resp:  [16-20] 18  SpO2:  [95 %-98 %] 96 %  BP: (121-152)/(79-99) 125/89     Weight: 70.9 kg (156 lb 4.9 oz)  Body mass index is 31.57 kg/m².    Intake/Output - Last 3 Shifts         08/27 0700 08/28 0659 08/28 0700 08/29 0659 08/29 0700 08/30 0659     I.V. (mL/kg) 12.5 (0.2)      IV Piggyback 500      Total Intake(mL/kg) 512.5 (7.2)      Urine (mL/kg/hr) 1600 (0.9)      Total Output 1600      Net -1087.5             Urine Occurrence  3 x              Physical Exam  Vitals reviewed.   Constitutional:       General: She is not in acute distress.  Cardiovascular:      Rate and Rhythm: Normal rate.   Pulmonary:      Effort: Pulmonary effort is normal. No respiratory distress.   Abdominal:      General: There is no distension.      Palpations: Abdomen is soft.      Tenderness: There is abdominal tenderness (upper abdomen, appropriate postoperatively).      Comments: Surgical bandages overlying laparoscopic incisions clean, dry, intact with small sanguinous shadowing   Skin:     General: Skin is warm and dry.   Neurological:      Mental Status: She is alert. Mental status is at baseline.          Significant Labs:  I have reviewed all pertinent lab results within the past 24 hours.    Significant Diagnostics:  I have reviewed all pertinent imaging results/findings within the past 24 hours.  Assessment/Plan:     No notes have been filed under this hospital service.  Service: General Surgery      Lázaro Live, MIKE  General Surgery  [facility] - [address] [place] [address]

## 2024-08-29 NOTE — DISCHARGE SUMMARY
[facility]  Discharge Note  Short Stay    Procedure(s) (LRB):  CHOLECYSTECTOMY, LAPAROSCOPIC (N/A)      OUTCOME: Patient tolerated treatment/procedure well without complication and is now ready for discharge.    DISPOSITION: Home or Self Care    FINAL DIAGNOSIS:  Calculus of gallbladder with acute cholecystitis without obstruction    FOLLOWUP: In clinic    DISCHARGE INSTRUCTIONS:    Discharge Procedure Orders   Diet Adult Regular     Diet Adult Regular     Remove dressing in 24 hours     Notify your health care provider if you experience any of the following:  temperature >100.4     Notify your health care provider if you experience any of the following:  persistent nausea and vomiting or diarrhea     Notify your health care provider if you experience any of the following:  severe uncontrolled pain     Notify your health care provider if you experience any of the following:  redness, tenderness, or signs of infection (pain, swelling, redness, odor or green/yellow discharge around incision site)     Notify your health care provider if you experience any of the following:  difficulty breathing or increased cough     Notify your health care provider if you experience any of the following:  severe persistent headache     Notify your health care provider if you experience any of the following:  worsening rash     Notify your health care provider if you experience any of the following:  persistent dizziness, light-headedness, or visual disturbances     Notify your health care provider if you experience any of the following:  increased confusion or weakness     Notify your health care provider if you experience any of the following:     Remove dressing in 24 hours     Notify your health care provider if you experience any of the following:  temperature >100.4     Notify your health care provider if you experience any of the following:  persistent nausea and vomiting or diarrhea      Notify your health care provider if you experience any of the following:  severe uncontrolled pain     Notify your health care provider if you experience any of the following:  redness, tenderness, or signs of infection (pain, swelling, redness, odor or green/yellow discharge around incision site)     Notify your health care provider if you experience any of the following:  difficulty breathing or increased cough     Notify your health care provider if you experience any of the following:  severe persistent headache     Notify your health care provider if you experience any of the following:  worsening rash     Notify your health care provider if you experience any of the following:  persistent dizziness, light-headedness, or visual disturbances     Notify your health care provider if you experience any of the following:  increased confusion or weakness     Notify your health care provider if you experience any of the following:     Shower on day dressing removed (No bath)     Shower on day dressing removed (No bath)        TIME SPENT ON DISCHARGE: 5 minutes

## 2024-08-30 ENCOUNTER — PATIENT OUTREACH (OUTPATIENT)
Dept: ADMINISTRATIVE | Facility: CLINIC | Age: 64
End: 2024-08-30

## 2024-08-30 NOTE — PROGRESS NOTES
C3 nurse attempted to contact patient. The following occurred:   C3 nurse attempted to contact Faviola King  for a TCC post hospital discharge follow up call. The patient is unable to conduct the call @ this time. Female answered and states pt was still lying down and requested a callback.    The patient does not have a scheduled HOSFU appointment within 5-7 days post hospital discharge date 8/29/24. Message sent to Physician staff to assist with HOSFU appointment scheduling.

## 2024-08-30 NOTE — PLAN OF CARE
[facility] - [facility] Telemetry  Discharge Final Note    Primary Care Provider: Shantanu Larson MD    Expected Discharge Date: 8/29/2024    Final Discharge Note (most recent)       Final Note - 08/30/24 0850          Final Note    Assessment Type Final Discharge Note     Anticipated Discharge Disposition Home or Self Care     What phone number can be called within the next 1-3 days to see how you are doing after discharge? [phone]        Post-Acute Status    Discharge Delays None known at this time                     Important Message from Medicare  Important Message from Medicare regarding Discharge Appeal Rights: Explained to patient/caregiver     Date IMM was signed: 08/29/24  Time IMM was signed: 1010    Contact Info       Lázaro Live FNP   Specialty: General Surgery    [address] [facility] [address]   Phone: [phone]       Next Steps: Follow up on 9/12/2024    Instructions: 1:15 pm          Pt discharged home on yesterday, no needs.

## 2024-09-03 RX ORDER — POTASSIUM CHLORIDE 20 MEQ/1
TABLET, EXTENDED RELEASE ORAL 2 TIMES DAILY
Qty: 4 TABLET | Refills: 0 | Status: SHIPPED | OUTPATIENT
Start: 2024-09-03

## 2024-09-03 NOTE — CARE UPDATE
Through communication with [facility], the Inpatient order is being changed to observation as the payer will not authorize Inpatient and the facility agrees not to appeal or challenge the change in status. The account will be changed to Observation for billing purposes.

## 2024-09-05 ENCOUNTER — OFFICE VISIT (OUTPATIENT)
Dept: FAMILY MEDICINE | Facility: CLINIC | Age: 64
End: 2024-09-05
Payer: MEDICARE

## 2024-09-05 VITALS
SYSTOLIC BLOOD PRESSURE: 129 MMHG | RESPIRATION RATE: 18 BRPM | BODY MASS INDEX: 30.84 KG/M2 | OXYGEN SATURATION: 100 % | HEART RATE: 89 BPM | WEIGHT: 153 LBS | DIASTOLIC BLOOD PRESSURE: 87 MMHG | TEMPERATURE: 98 F | HEIGHT: 59 IN

## 2024-09-05 DIAGNOSIS — Z90.49 S/P CHOLECYSTECTOMY: ICD-10-CM

## 2024-09-05 DIAGNOSIS — R53.1 WEAKNESS: Primary | ICD-10-CM

## 2024-09-05 LAB
ALBUMIN SERPL BCP-MCNC: 4 G/DL (ref 3.5–5)
ALBUMIN/GLOB SERPL: 1.2 {RATIO}
ALP SERPL-CCNC: 77 U/L (ref 55–142)
ALT SERPL W P-5'-P-CCNC: 22 U/L (ref 13–56)
ANION GAP SERPL CALCULATED.3IONS-SCNC: 10 MMOL/L (ref 7–16)
AST SERPL W P-5'-P-CCNC: 15 U/L (ref 15–37)
BASOPHILS # BLD AUTO: 0.04 K/UL (ref 0–0.2)
BASOPHILS NFR BLD AUTO: 0.4 % (ref 0–1)
BILIRUB SERPL-MCNC: 0.3 MG/DL (ref ?–1.2)
BUN SERPL-MCNC: 13 MG/DL (ref 7–18)
BUN/CREAT SERPL: 14 (ref 6–20)
CALCIUM SERPL-MCNC: 10.2 MG/DL (ref 8.5–10.1)
CHLORIDE SERPL-SCNC: 108 MMOL/L (ref 98–107)
CO2 SERPL-SCNC: 28 MMOL/L (ref 21–32)
CREAT SERPL-MCNC: 0.93 MG/DL (ref 0.55–1.02)
DIFFERENTIAL METHOD BLD: ABNORMAL
EGFR (NO RACE VARIABLE) (RUSH/TITUS): 68 ML/MIN/1.73M2
EOSINOPHIL # BLD AUTO: 0.12 K/UL (ref 0–0.5)
EOSINOPHIL NFR BLD AUTO: 1.2 % (ref 1–4)
ERYTHROCYTE [DISTWIDTH] IN BLOOD BY AUTOMATED COUNT: 14.4 % (ref 11.5–14.5)
GLOBULIN SER-MCNC: 3.4 G/DL (ref 2–4)
GLUCOSE SERPL-MCNC: 90 MG/DL (ref 74–106)
HBV SURFACE AG SERPL QL IA: NORMAL
HCT VFR BLD AUTO: 39.9 % (ref 38–47)
HGB BLD-MCNC: 13 G/DL (ref 12–16)
IMM GRANULOCYTES # BLD AUTO: 0.07 K/UL (ref 0–0.04)
IMM GRANULOCYTES NFR BLD: 0.7 % (ref 0–0.4)
LYMPHOCYTES # BLD AUTO: 2.99 K/UL (ref 1–4.8)
LYMPHOCYTES NFR BLD AUTO: 30.2 % (ref 27–41)
MCH RBC QN AUTO: 29.8 PG (ref 27–31)
MCHC RBC AUTO-ENTMCNC: 32.6 G/DL (ref 32–36)
MCV RBC AUTO: 91.5 FL (ref 80–96)
MONOCYTES # BLD AUTO: 0.98 K/UL (ref 0–0.8)
MONOCYTES NFR BLD AUTO: 9.9 % (ref 2–6)
MPC BLD CALC-MCNC: 10.2 FL (ref 9.4–12.4)
NEUTROPHILS # BLD AUTO: 5.7 K/UL (ref 1.8–7.7)
NEUTROPHILS NFR BLD AUTO: 57.6 % (ref 53–65)
NRBC # BLD AUTO: 0 X10E3/UL
NRBC, AUTO (.00): 0 %
PLATELET # BLD AUTO: 345 K/UL (ref 150–400)
POTASSIUM SERPL-SCNC: 4.4 MMOL/L (ref 3.5–5.1)
PROT SERPL-MCNC: 7.4 G/DL (ref 6.4–8.2)
RBC # BLD AUTO: 4.36 M/UL (ref 4.2–5.4)
SODIUM SERPL-SCNC: 142 MMOL/L (ref 136–145)
WBC # BLD AUTO: 9.9 K/UL (ref 4.5–11)

## 2024-09-05 PROCEDURE — 3008F BODY MASS INDEX DOCD: CPT | Mod: ,,, | Performed by: INTERNAL MEDICINE

## 2024-09-05 PROCEDURE — 3079F DIAST BP 80-89 MM HG: CPT | Mod: ,,, | Performed by: INTERNAL MEDICINE

## 2024-09-05 PROCEDURE — 80053 COMPREHEN METABOLIC PANEL: CPT | Mod: ,,, | Performed by: CLINICAL MEDICAL LABORATORY

## 2024-09-05 PROCEDURE — 3044F HG A1C LEVEL LT 7.0%: CPT | Mod: ,,, | Performed by: INTERNAL MEDICINE

## 2024-09-05 PROCEDURE — 3288F FALL RISK ASSESSMENT DOCD: CPT | Mod: ,,, | Performed by: INTERNAL MEDICINE

## 2024-09-05 PROCEDURE — 1125F AMNT PAIN NOTED PAIN PRSNT: CPT | Mod: ,,, | Performed by: INTERNAL MEDICINE

## 2024-09-05 PROCEDURE — 3074F SYST BP LT 130 MM HG: CPT | Mod: ,,, | Performed by: INTERNAL MEDICINE

## 2024-09-05 PROCEDURE — 1101F PT FALLS ASSESS-DOCD LE1/YR: CPT | Mod: ,,, | Performed by: INTERNAL MEDICINE

## 2024-09-05 PROCEDURE — 99213 OFFICE O/P EST LOW 20 MIN: CPT | Mod: ,,, | Performed by: INTERNAL MEDICINE

## 2024-09-05 PROCEDURE — 87340 HEPATITIS B SURFACE AG IA: CPT | Mod: ,,, | Performed by: CLINICAL MEDICAL LABORATORY

## 2024-09-05 PROCEDURE — 86704 HEP B CORE ANTIBODY TOTAL: CPT | Mod: 90,GZ,, | Performed by: CLINICAL MEDICAL LABORATORY

## 2024-09-05 PROCEDURE — 85025 COMPLETE CBC W/AUTO DIFF WBC: CPT | Mod: ,,, | Performed by: CLINICAL MEDICAL LABORATORY

## 2024-09-05 NOTE — LETTER
September 5, 2024      [facility] [address]  [address] [place] [address] [place] [address]  Phone: [phone]  Fax: [phone]       Patient: Faviola King   YOB: 1960  Date of Visit: 09/05/2024    To Whom It May Concern:    Toya King  was at [facility] on 09/05/2024. She has been taken off work by Primary Care Provider Dr. Shantanu Larson MD until further notice. If you have any questions or concerns, or if I can be of further assistance, please do not hesitate to contact me.    Sincerely,    Tanvi Porter LPN

## 2024-09-07 LAB — HBV CORE AB SERPL QL IA: NEGATIVE

## 2024-09-10 ENCOUNTER — TELEPHONE (OUTPATIENT)
Dept: GASTROENTEROLOGY | Facility: CLINIC | Age: 64
End: 2024-09-10
Payer: MEDICARE

## 2024-09-10 ENCOUNTER — OFFICE VISIT (OUTPATIENT)
Dept: GASTROENTEROLOGY | Facility: CLINIC | Age: 64
End: 2024-09-10
Payer: MEDICARE

## 2024-09-10 VITALS
OXYGEN SATURATION: 97 % | HEIGHT: 59 IN | DIASTOLIC BLOOD PRESSURE: 92 MMHG | SYSTOLIC BLOOD PRESSURE: 140 MMHG | WEIGHT: 151 LBS | HEART RATE: 87 BPM | BODY MASS INDEX: 30.44 KG/M2

## 2024-09-10 DIAGNOSIS — K21.9 GASTROESOPHAGEAL REFLUX DISEASE, UNSPECIFIED WHETHER ESOPHAGITIS PRESENT: Primary | ICD-10-CM

## 2024-09-10 PROCEDURE — 3044F HG A1C LEVEL LT 7.0%: CPT | Mod: CPTII,,, | Performed by: NURSE PRACTITIONER

## 2024-09-10 PROCEDURE — 3077F SYST BP >= 140 MM HG: CPT | Mod: CPTII,,, | Performed by: NURSE PRACTITIONER

## 2024-09-10 PROCEDURE — 3080F DIAST BP >= 90 MM HG: CPT | Mod: CPTII,,, | Performed by: NURSE PRACTITIONER

## 2024-09-10 PROCEDURE — 99999 PR PBB SHADOW E&M-EST. PATIENT-LVL IV: CPT | Mod: PBBFAC,,, | Performed by: NURSE PRACTITIONER

## 2024-09-10 PROCEDURE — 99214 OFFICE O/P EST MOD 30 MIN: CPT | Mod: PBBFAC | Performed by: NURSE PRACTITIONER

## 2024-09-10 PROCEDURE — 3008F BODY MASS INDEX DOCD: CPT | Mod: CPTII,,, | Performed by: NURSE PRACTITIONER

## 2024-09-10 PROCEDURE — 99214 OFFICE O/P EST MOD 30 MIN: CPT | Mod: S$PBB,,, | Performed by: NURSE PRACTITIONER

## 2024-09-10 PROCEDURE — 1159F MED LIST DOCD IN RCRD: CPT | Mod: CPTII,,, | Performed by: NURSE PRACTITIONER

## 2024-09-10 PROCEDURE — 1160F RVW MEDS BY RX/DR IN RCRD: CPT | Mod: CPTII,,, | Performed by: NURSE PRACTITIONER

## 2024-09-10 NOTE — PROGRESS NOTES
Faviola King is a 64 y.o. female here for Follow-up (GERD/Gas)        PCP: Shantanu Larson  Referring Provider: No referring provider defined for this encounter.     HPI:  Presents for follow-up appointment with GERD.  Patient had a laparoscopic cholecystectomy on 08/27/2024, report reviewed. Has minimal soreness in the RUQ. One episode of loose stool yesterday. No hematochezia or melena. Reports that reflux symptoms have improved.  Feels that she no longer requires PPI.  Advised that we will discontinue PPI if she has intermittent reflux she may take over-the-counter H2 blocker as needed.  Weight has stabilized over the last year.  History of lung CA. Surgery was performed at [facility].  States that she follows up a [facility] twice yearly.  Last colonoscopy was 08/10/2022, report reviewed, 3 colon polyps removed, tubular adenomas.  She will be due for repeat colonoscopy in August of 2025.  Patient did question about Cologuard testing.  Advised that she is not a candidate for Cologuard testing due to previous tubular adenoma polyps.  Recent labs from 09/05/2024 reviewed, no anemia, liver enzymes are normal.    Follow-up  Pertinent negatives include no abdominal pain, change in bowel habit, fatigue, fever, nausea or vomiting.         ROS:  Review of Systems   Constitutional:  Negative for appetite change, fatigue and fever.   HENT:  Negative for trouble swallowing.    Respiratory:  Negative for shortness of breath.    Gastrointestinal:  Negative for abdominal pain, blood in stool, change in bowel habit, constipation, diarrhea, nausea, vomiting and reflux (improved).   Musculoskeletal:  Negative for gait problem.   Integumentary:  Negative for pallor.   Psychiatric/Behavioral:  The patient is not nervous/anxious.           PMHX:  has a past medical history of GERD (gastroesophageal reflux disease), Heart attack (2017), Hypertension, and Nodule of upper lobe of right lung.    PSHX:  has a past surgical history that includes  "Hysterectomy;  section; and Laparoscopic cholecystectomy (N/A, 2024).    PFHX: family history includes Heart failure in her mother; Hypertension in her father and mother.    PSlHX:  reports that she quit smoking about 16 months ago. Her smoking use included cigarettes. She has a 40 pack-year smoking history. She has never used smokeless tobacco. She reports current drug use. Drug: Marijuana. She reports that she does not drink alcohol.        Review of patient's allergies indicates:  No Known Allergies    Medication List with Changes/Refills   Current Medications    ALBUTEROL (VENTOLIN HFA) 90 MCG/ACTUATION INHALER    Inhale 2 puffs into the lungs 2 (two) times a day. Rescue    AMLODIPINE (NORVASC) 10 MG TABLET    Take 1 tablet (10 mg total) by mouth once daily.    ATORVASTATIN (LIPITOR) 40 MG TABLET    Take 1 tablet (40 mg total) by mouth once daily.    CARVEDILOL (COREG) 6.25 MG TABLET    Take 1 tablet (6.25 mg total) by mouth 2 (two) times daily.    CHOLECALCIFEROL, VITAMIN D3, (VITAMIN D3) 125 MCG (5,000 UNIT) TAB    Take 1 tablet (5,000 Units total) by mouth 3 (three) times a week.    CHOLESTYRAMINE (QUESTRAN) 4 GRAM PACKET    Take 1 packet (4 g total) by mouth 3 (three) times daily with meals.    HYDROCODONE-ACETAMINOPHEN (NORCO) 7.5-325 MG PER TABLET    Take 1 tablet by mouth every 6 (six) hours as needed for Pain.    ONDANSETRON (ZOFRAN-ODT) 4 MG TBDL    Take 1 tablet (4 mg total) by mouth every 6 (six) hours as needed (nausea).    POTASSIUM CHLORIDE SA (K-DUR,KLOR-CON) 20 MEQ TABLET    TAKE 1  BY MOUTH TWICE DAILY    SCOPOLAMINE (TRANSDERM-SCOP) 1.3-1.5 MG (1 MG OVER 3 DAYS)    Place 1 patch onto the skin every 72 hours.   Discontinued Medications    PANTOPRAZOLE (PROTONIX) 40 MG TABLET    Take 1 tablet (40 mg total) by mouth once daily.        Objective Findings:  Vital Signs:  BP (!) 140/92   Pulse 87   Ht 4' 11" (1.499 m)   Wt 68.5 kg (151 lb)   SpO2 97%   BMI 30.50 kg/m²  Body mass " index is 30.5 kg/m².    Physical Exam:  Physical Exam  Vitals and nursing note reviewed.   Constitutional:       General: She is not in acute distress.     Appearance: Normal appearance.   HENT:      Mouth/Throat:      Mouth: Mucous membranes are moist.   Cardiovascular:      Rate and Rhythm: Normal rate.   Pulmonary:      Effort: Pulmonary effort is normal.      Breath sounds: No wheezing, rhonchi or rales.   Abdominal:      General: Bowel sounds are normal. There is no distension.      Palpations: Abdomen is soft. There is no mass.      Tenderness: There is no abdominal tenderness.      Hernia: No hernia is present.   Skin:     General: Skin is warm and dry.      Coloration: Skin is not jaundiced or pale.   Neurological:      Mental Status: She is alert and oriented to person, place, and time.   Psychiatric:         Mood and Affect: Mood normal.          Labs:  Lab Results   Component Value Date    WBC 9.90 09/05/2024    HGB 13.0 09/05/2024    HCT 39.9 09/05/2024    MCV 91.5 09/05/2024    RDW 14.4 09/05/2024     09/05/2024    LYMPH 30.2 09/05/2024    LYMPH 2.99 09/05/2024    MONO 9.9 (H) 09/05/2024    EOS 0.12 09/05/2024    BASO 0.04 09/05/2024     Lab Results   Component Value Date     09/05/2024    K 4.4 09/05/2024     (H) 09/05/2024    CO2 28 09/05/2024    GLU 90 09/05/2024    BUN 13 09/05/2024    CREATININE 0.93 09/05/2024    CALCIUM 10.2 (H) 09/05/2024    PROT 7.4 09/05/2024    ALBUMIN 4.0 09/05/2024    BILITOT 0.3 09/05/2024    ALKPHOS 77 09/05/2024    AST 15 09/05/2024    ALT 22 09/05/2024         Imaging: CT Abdomen Pelvis With IV Contrast NO Oral Contrast    Result Date: 8/26/2024  EXAMINATION: CT ABDOMEN PELVIS WITH IV CONTRAST CLINICAL HISTORY: Epigastric pain; TECHNIQUE: Axial CT imaging of the abdomen and pelvis is performed with intravenous contrast. Contrast dose is 100 cc Isovue 370. CT dose reduction technique used - Dose Rite and tube current modulation. COMPARISON: 20  September 2018 FINDINGS: Cardiac and lung bases are within normal limits CT abdomen: The gallbladder has multiple calculi present.  The liver, spleen, pancreas and adrenal glands are normal in size and enhancement.  No evidence of focal lesion is demonstrated in these solid organs. Kidneys are normal in size and enhancement.  No evidence of hydronephrosis or nephrolithiasis is seen. The bowel caliber is normal and no wall thickening or adjacent inflammatory change is seen.  No evidence of free fluid or free air is present.  Appendix appears normal. CT pelvis: The pelvic bowel appears within normal limits.  Bladder shows no evidence of abnormality.  The uterus and ovaries were not identified.     Cholelithiasis.  No other definite acute findings. Electronically signed by: Michael Pedraza Date:    08/26/2024 Time:    13:41        Assessment:  Faviola King is a 64 y.o. female here with:  1. Gastroesophageal reflux disease, unspecified whether esophagitis present          Recommendations:  1. Avoid spicy, greasy foods  Avoid caffeine, citric acid, chocolate, peppermint, and carbonated drinks  Do not lay down within 3 hours of eating  Exercise 150 minutes per week  Increase fluid to 64 ounces daily  Avoid antiinflammatory medications such as motrin, advil, aleve, ibuprofen, and BC powder  2. May stop Protonix. If needed may take Pepcid as needed for breakthrough reflux.    Follow up in about 1 year (around 9/10/2025).      Order summary:       Thank you for allowing me to participate in the care of Faviola King.      HERMELINDO Francisco

## 2024-09-10 NOTE — PATIENT INSTRUCTIONS
Stop Protonix  Avoid spicy, greasy foods  Avoid caffeine, citric acid, chocolate, peppermint, and carbonated drinks  Do not lay down within 3 hours of eating  Increase fluid to 64 ounces daily  Avoid antiinflammatory medications such as motrin, advil, aleve, ibuprofen, and BC powder

## 2024-09-11 ENCOUNTER — HOSPITAL ENCOUNTER (OUTPATIENT)
Dept: CARDIOLOGY | Facility: HOSPITAL | Age: 64
Discharge: HOME | End: 2024-09-11
Attending: STUDENT IN AN ORGANIZED HEALTH CARE EDUCATION/TRAINING PROGRAM
Payer: MEDICARE

## 2024-09-11 DIAGNOSIS — I50.22 CHRONIC SYSTOLIC HEART FAILURE: ICD-10-CM

## 2024-09-11 PROCEDURE — 93306 TTE W/DOPPLER COMPLETE: CPT | Mod: 26,,, | Performed by: STUDENT IN AN ORGANIZED HEALTH CARE EDUCATION/TRAINING PROGRAM

## 2024-09-11 PROCEDURE — 93306 TTE W/DOPPLER COMPLETE: CPT

## 2024-09-11 NOTE — PROGRESS NOTES
Subjective:       Patient ID: Faviola King is a 64 y.o. female.    Chief Complaint: Procedure (Follow up on emergency surgery )    HPI  .  Patient is status post cholecystectomy does complain of moderate weakness and does have intermittent abdominal pain.  But the abdominal pain is getting better.  Today she is pain-free but she has occasional nausea.    Current Medications:    Current Outpatient Medications:     albuterol (VENTOLIN HFA) 90 mcg/actuation inhaler, Inhale 2 puffs into the lungs 2 (two) times a day. Rescue, Disp: 18 g, Rfl: 6    amLODIPine (NORVASC) 10 MG tablet, Take 1 tablet (10 mg total) by mouth once daily., Disp: 90 tablet, Rfl: 1    carvediloL (COREG) 6.25 MG tablet, Take 1 tablet (6.25 mg total) by mouth 2 (two) times daily., Disp: 180 tablet, Rfl: 0    cholecalciferol, vitamin D3, (VITAMIN D3) 125 mcg (5,000 unit) Tab, Take 1 tablet (5,000 Units total) by mouth 3 (three) times a week., Disp: 90 tablet, Rfl: 1    HYDROcodone-acetaminophen (NORCO) 7.5-325 mg per tablet, Take 1 tablet by mouth every 6 (six) hours as needed for Pain., Disp: 15 tablet, Rfl: 0    ondansetron (ZOFRAN-ODT) 4 MG TbDL, Take 1 tablet (4 mg total) by mouth every 6 (six) hours as needed (nausea)., Disp: 20 tablet, Rfl: 0    potassium chloride SA (K-DUR,KLOR-CON) 20 MEQ tablet, TAKE 1  BY MOUTH TWICE DAILY, Disp: 4 tablet, Rfl: 0    atorvastatin (LIPITOR) 40 MG tablet, Take 1 tablet (40 mg total) by mouth once daily., Disp: 90 tablet, Rfl: 1    cholestyramine (QUESTRAN) 4 gram packet, Take 1 packet (4 g total) by mouth 3 (three) times daily with meals., Disp: 90 packet, Rfl: 1    scopolamine (TRANSDERM-SCOP) 1.3-1.5 mg (1 mg over 3 days), Place 1 patch onto the skin every 72 hours., Disp: 10 patch, Rfl: 0           ROS  Twelve point system reviewed, unremarkable except for stated above in HPI.        Objective:         Vitals:    09/05/24 1131   BP: 129/87   BP Location: Left arm   Patient Position: Sitting   BP Method: Large  "(Automatic)   Pulse: 89   Resp: 18   Temp: 97.5 °F (36.4 °C)   TempSrc: Temporal   SpO2: 100%   Weight: 69.4 kg (153 lb)   Height: 4' 11" (1.499 m)        Physical Exam     Patient is awake alert oriented person place and  Lungs are clear to auscultation bilaterally no crackles or wheezes   Cardiovascular S1-S2 regular rate and rhythm no murmurs rubs or gallops   Abdomen is soft positive bowel sounds nontender, extremities no clubbing cyanosis edema  Neuro no focal neurological deficits  Skin warm and dry.     Last Labs:     Office Visit on 09/05/2024   Component Date Value    Sodium 09/05/2024 142     Potassium 09/05/2024 4.4     Chloride 09/05/2024 108 (H)     CO2 09/05/2024 28     Anion Gap 09/05/2024 10     Glucose 09/05/2024 90     BUN 09/05/2024 13     Creatinine 09/05/2024 0.93     BUN/Creatinine Ratio 09/05/2024 14     Calcium 09/05/2024 10.2 (H)     Total Protein 09/05/2024 7.4     Albumin 09/05/2024 4.0     Globulin 09/05/2024 3.4     A/G Ratio 09/05/2024 1.2     Bilirubin, Total 09/05/2024 0.3     Alk Phos 09/05/2024 77     ALT 09/05/2024 22     AST 09/05/2024 15     eGFR 09/05/2024 68     Hepatitis B Surface Ag 09/05/2024 Non-Reactive     HBc Total Ab 09/05/2024 Negative     WBC 09/05/2024 9.90     RBC 09/05/2024 4.36     Hemoglobin 09/05/2024 13.0     Hematocrit 09/05/2024 39.9     MCV 09/05/2024 91.5     MCH 09/05/2024 29.8     MCHC 09/05/2024 32.6     RDW 09/05/2024 14.4     Platelet Count 09/05/2024 345     MPV 09/05/2024 10.2     Neutrophils % 09/05/2024 57.6     Lymphocytes % 09/05/2024 30.2     Monocytes % 09/05/2024 9.9 (H)     Eosinophils % 09/05/2024 1.2     Basophils % 09/05/2024 0.4     Immature Granulocytes % 09/05/2024 0.7 (H)     nRBC, Auto 09/05/2024 0.0     Neutrophils, Abs 09/05/2024 5.70     Lymphocytes, Absolute 09/05/2024 2.99     Monocytes, Absolute 09/05/2024 0.98 (H)     Eosinophils, Absolute 09/05/2024 0.12     Basophils, Absolute 09/05/2024 0.04     Immature Granulocytes, A* " 09/05/2024 0.07 (H)     nRBC, Absolute 09/05/2024 0.00     Diff Type 09/05/2024 Auto    Admission on 08/26/2024, Discharged on 08/29/2024   Component Date Value    Sodium 08/26/2024 136     Potassium 08/26/2024 2.9 (L)     Chloride 08/26/2024 105     CO2 08/26/2024 25     Anion Gap 08/26/2024 9     Glucose 08/26/2024 132 (H)     BUN 08/26/2024 10     Creatinine 08/26/2024 0.72     BUN/Creatinine Ratio 08/26/2024 14     Calcium 08/26/2024 9.9     Total Protein 08/26/2024 8.7 (H)     Albumin 08/26/2024 4.2     Globulin 08/26/2024 4.5 (H)     A/G Ratio 08/26/2024 0.9     Bilirubin, Total 08/26/2024 0.4     Alk Phos 08/26/2024 81     ALT 08/26/2024 20     AST 08/26/2024 23     eGFR 08/26/2024 92     Lipase 08/26/2024 23     Color, UA 08/26/2024 Colorless     Clarity, UA 08/26/2024 Clear     pH, UA 08/26/2024 8.0     Leukocytes, UA 08/26/2024 Negative     Nitrites, UA 08/26/2024 Negative     Protein, UA 08/26/2024 30 (A)     Glucose, UA 08/26/2024 Normal     Ketones, UA 08/26/2024 Trace     Urobilinogen, UA 08/26/2024 Normal     Bilirubin, UA 08/26/2024 Negative     Blood, UA 08/26/2024 Negative     Specific Gravity, UA 08/26/2024 1.015     SARS COV-2 Molecular 08/26/2024 Negative     WBC 08/26/2024 8.29     RBC 08/26/2024 5.15     Hemoglobin 08/26/2024 14.8     Hematocrit 08/26/2024 46.4     MCV 08/26/2024 90.1     MCH 08/26/2024 28.7     MCHC 08/26/2024 31.9 (L)     RDW 08/26/2024 14.2     Platelet Count 08/26/2024 295     MPV 08/26/2024 9.7     Neutrophils % 08/26/2024 79.1 (H)     Lymphocytes % 08/26/2024 16.8 (L)     Monocytes % 08/26/2024 3.4     Eosinophils % 08/26/2024 0.1 (L)     Basophils % 08/26/2024 0.1     Immature Granulocytes % 08/26/2024 0.5 (H)     nRBC, Auto 08/26/2024 0.0     Neutrophils, Abs 08/26/2024 6.56     Lymphocytes, Absolute 08/26/2024 1.39     Monocytes, Absolute 08/26/2024 0.28     Eosinophils, Absolute 08/26/2024 0.01     Basophils, Absolute 08/26/2024 0.01     Immature Granulocytes, A*  08/26/2024 0.04     nRBC, Absolute 08/26/2024 0.00     Diff Type 08/26/2024 Auto     WBC, UA 08/26/2024 1     RBC, UA 08/26/2024 5 (H)     Bacteria, UA 08/26/2024 Occasional (A)     Squamous Epithelial Cell* 08/26/2024 Occasional (A)     Mucous 08/26/2024 Occasional (A)     Hemoglobin A1C 08/26/2024 5.6     Estimated Average Glucose 08/26/2024 114     Sodium 08/27/2024 136     Potassium 08/27/2024 3.2 (L)     Chloride 08/27/2024 105     CO2 08/27/2024 26     Anion Gap 08/27/2024 8     Glucose 08/27/2024 96     BUN 08/27/2024 10     Creatinine 08/27/2024 0.94     BUN/Creatinine Ratio 08/27/2024 11     Calcium 08/27/2024 9.2     Total Protein 08/27/2024 7.2     Albumin 08/27/2024 3.5     Globulin 08/27/2024 3.7     A/G Ratio 08/27/2024 0.9     Bilirubin, Total 08/27/2024 0.3     Alk Phos 08/27/2024 68     ALT 08/27/2024 20     AST 08/27/2024 21     eGFR 08/27/2024 67     Case Report 08/27/2024                      Value:Surgical Pathology                                Case: [id number]                                   Authorizing Provider:  Rickey Santizo MD       Collected:           08/27/2024 11:53 AM          Ordering Location:     [facility] -     Received:            08/27/2024 12:23 PM                                 Periop Services                                                              Pathologist:           Ervin Choe MD                                                      Specimen:    Gallbladder, gallbladder                                                                   Final Diagnosis 08/27/2024                      Value:This result contains rich text formatting which cannot be displayed here.    Gross Description 08/27/2024                      Value:This result contains rich text formatting which cannot be displayed here.    Microscopic Description 08/27/2024                      Value:This result contains rich text formatting which cannot be displayed here.    Laboratory  Notes 08/27/2024                      Value:This result contains rich text formatting which cannot be displayed here.    Sodium 08/29/2024 131 (L)     Potassium 08/29/2024 3.1 (L)     Chloride 08/29/2024 97 (L)     CO2 08/29/2024 28     Anion Gap 08/29/2024 9     Glucose 08/29/2024 119 (H)     BUN 08/29/2024 8     Creatinine 08/29/2024 0.79     BUN/Creatinine Ratio 08/29/2024 10     Calcium 08/29/2024 9.8     Total Protein 08/29/2024 8.6 (H)     Albumin 08/29/2024 4.1     Globulin 08/29/2024 4.5 (H)     A/G Ratio 08/29/2024 0.9     Bilirubin, Total 08/29/2024 0.5     Alk Phos 08/29/2024 86     ALT 08/29/2024 39     AST 08/29/2024 30     eGFR 08/29/2024 83     WBC 08/29/2024 9.75     RBC 08/29/2024 5.74 (H)     Hemoglobin 08/29/2024 16.8 (H)     Hematocrit 08/29/2024 50.0 (H)     MCV 08/29/2024 87.1     MCH 08/29/2024 29.3     MCHC 08/29/2024 33.6     RDW 08/29/2024 13.8     Platelet Count 08/29/2024 276     MPV 08/29/2024 9.8     Neutrophils % 08/29/2024 60.8     Lymphocytes % 08/29/2024 27.9     Monocytes % 08/29/2024 10.6 (H)     Eosinophils % 08/29/2024 0.1 (L)     Basophils % 08/29/2024 0.3     Immature Granulocytes % 08/29/2024 0.3     nRBC, Auto 08/29/2024 0.0     Neutrophils, Abs 08/29/2024 5.93     Lymphocytes, Absolute 08/29/2024 2.72     Monocytes, Absolute 08/29/2024 1.03 (H)     Eosinophils, Absolute 08/29/2024 0.01     Basophils, Absolute 08/29/2024 0.03     Immature Granulocytes, A* 08/29/2024 0.03     nRBC, Absolute 08/29/2024 0.00     Diff Type 08/29/2024 Auto    Office Visit on 08/22/2024   Component Date Value    QRS Duration 08/22/2024 100     [facility] QTC Calculation 08/22/2024 427        Last Imaging:  CT Abdomen Pelvis With IV Contrast NO Oral Contrast  Narrative: EXAMINATION:  CT ABDOMEN PELVIS WITH IV CONTRAST    CLINICAL HISTORY:  Epigastric pain;    TECHNIQUE:  Axial CT imaging of the abdomen and pelvis is performed with intravenous contrast. Contrast dose is 100 cc Isovue 370.    CT dose  reduction technique used - Dose Rite and tube current modulation.    COMPARISON:  20 September 2018    FINDINGS:  Cardiac and lung bases are within normal limits    CT abdomen: The gallbladder has multiple calculi present.  The liver, spleen, pancreas and adrenal glands are normal in size and enhancement.  No evidence of focal lesion is demonstrated in these solid organs.    Kidneys are normal in size and enhancement.  No evidence of hydronephrosis or nephrolithiasis is seen.    The bowel caliber is normal and no wall thickening or adjacent inflammatory change is seen.  No evidence of free fluid or free air is present.  Appendix appears normal.    CT pelvis: The pelvic bowel appears within normal limits.  Bladder shows no evidence of abnormality.  The uterus and ovaries were not identified.  Impression: Cholelithiasis.  No other definite acute findings.    Electronically signed by: Michael Pedraza  Date:    08/26/2024  Time:    13:41         **Labs and x-rays personally reviewed by me    ** reviewed           Assessment & Plan:       1. Weakness  -     Comprehensive Metabolic Panel; Future; Expected date: 09/05/2024  -     CBC Auto Differential; Future; Expected date: 09/05/2024  -     Hepatitis B Surface Antigen; Future; Expected date: 09/05/2024  -     Hepatitis B Core Antibody, Total; Future; Expected date: 09/05/2024    2. S/P cholecystectomy  -     Comprehensive Metabolic Panel; Future; Expected date: 09/05/2024  -     CBC Auto Differential; Future; Expected date: 09/05/2024            Shantanu Larson MD

## 2024-09-12 ENCOUNTER — OFFICE VISIT (OUTPATIENT)
Dept: SURGERY | Facility: CLINIC | Age: 64
End: 2024-09-12
Payer: MEDICARE

## 2024-09-12 VITALS — BODY MASS INDEX: 30.5 KG/M2 | HEIGHT: 59 IN

## 2024-09-12 DIAGNOSIS — Z90.49 STATUS POST LAPAROSCOPIC CHOLECYSTECTOMY: Primary | ICD-10-CM

## 2024-09-12 LAB
AORTIC ROOT ANNULUS: 2.76 CM
AORTIC VALVE CUSP SEPERATION: 1.73 CM
AV INDEX (PROSTH): 0.82
AV MEAN GRADIENT: 3 MMHG
AV PEAK GRADIENT: 6 MMHG
AV VALVE AREA BY VELOCITY RATIO: 2.11 CM²
AV VALVE AREA: 2.43 CM²
AV VELOCITY RATIO: 0.71
CV ECHO LV RWT: 0.31 CM
DOP CALC AO PEAK VEL: 1.22 M/S
DOP CALC AO VTI: 22.1 CM
DOP CALC LVOT AREA: 3 CM2
DOP CALC LVOT DIAMETER: 1.94 CM
DOP CALC LVOT PEAK VEL: 0.87 M/S
DOP CALC LVOT STROKE VOLUME: 53.77 CM3
DOP CALCLVOT PEAK VEL VTI: 18.2 CM
E WAVE DECELERATION TIME: 316.73 MSEC
E/A RATIO: 0.64
E/E' RATIO: 8.46 M/S
ECHO LV POSTERIOR WALL: 0.6 CM (ref 0.6–1.1)
FRACTIONAL SHORTENING: 17 % (ref 28–44)
INTERVENTRICULAR SEPTUM: 0.7 CM (ref 0.6–1.1)
IVC DIAMETER: 1.01 CM
LEFT ATRIUM AREA SYSTOLIC (APICAL 2 CHAMBER): 16.93 CM2
LEFT ATRIUM AREA SYSTOLIC (APICAL 4 CHAMBER): 13.54 CM2
LEFT ATRIUM VOLUME MOD: 36.69 CM3
LEFT INTERNAL DIMENSION IN SYSTOLE: 3.24 CM (ref 2.1–4)
LEFT VENTRICLE DIASTOLIC VOLUME: 60.46 ML
LEFT VENTRICLE END SYSTOLIC VOLUME APICAL 2 CHAMBER: 47.02 ML
LEFT VENTRICLE END SYSTOLIC VOLUME APICAL 4 CHAMBER: 28.67 ML
LEFT VENTRICLE SYSTOLIC VOLUME: 42.19 ML
LEFT VENTRICULAR INTERNAL DIMENSION IN DIASTOLE: 3.9 CM (ref 3.5–6)
LEFT VENTRICULAR MASS: 68.23 G
LV LATERAL E/E' RATIO: 6.88 M/S
LV SEPTAL E/E' RATIO: 11 M/S
LVED V (TEICH): 60.46 ML
LVES V (TEICH): 42.19 ML
LVOT MG: 1.46 MMHG
LVOT MV: 0.57 CM/S
MV PEAK A VEL: 0.86 M/S
MV PEAK E VEL: 0.55 M/S
MV STENOSIS PRESSURE HALF TIME: 91.85 MS
MV VALVE AREA P 1/2 METHOD: 2.4 CM2
PISA TR MAX VEL: 2.65 M/S
PV PEAK GRADIENT: 3 MMHG
PV PEAK VELOCITY: 0.84 M/S
RA PRESSURE ESTIMATED: 3 MMHG
RA VOL SYS: 35.63 ML
RIGHT ATRIAL AREA: 13.7 CM2
RIGHT ATRIUM VOLUME AREA LENGTH APICAL 4 CHAMBER: 33.61 ML
RIGHT VENTRICLE DIASTOLIC BASEL DIMENSION: 2.5 CM
RIGHT VENTRICLE DIASTOLIC LENGTH: 5.9 CM
RIGHT VENTRICLE DIASTOLIC MID DIMENSION: 2 CM
RIGHT VENTRICULAR LENGTH IN DIASTOLE (APICAL 4-CHAMBER VIEW): 5.94 CM
RV MID DIAMA: 1.96 CM
RV TB RVSP: 6 MMHG
TDI LATERAL: 0.08 M/S
TDI SEPTAL: 0.05 M/S
TDI: 0.07 M/S
TR MAX PG: 28 MMHG
TRICUSPID ANNULAR PLANE SYSTOLIC EXCURSION: 2.05 CM
TV REST PULMONARY ARTERY PRESSURE: 31 MMHG

## 2024-09-12 PROCEDURE — 1160F RVW MEDS BY RX/DR IN RCRD: CPT | Mod: CPTII,,, | Performed by: NURSE PRACTITIONER

## 2024-09-12 PROCEDURE — 99999 PR PBB SHADOW E&M-EST. PATIENT-LVL III: CPT | Mod: PBBFAC,,, | Performed by: NURSE PRACTITIONER

## 2024-09-12 PROCEDURE — 3044F HG A1C LEVEL LT 7.0%: CPT | Mod: CPTII,,, | Performed by: NURSE PRACTITIONER

## 2024-09-12 PROCEDURE — 99024 POSTOP FOLLOW-UP VISIT: CPT | Mod: S$PBB,,, | Performed by: NURSE PRACTITIONER

## 2024-09-12 PROCEDURE — 99213 OFFICE O/P EST LOW 20 MIN: CPT | Mod: PBBFAC | Performed by: NURSE PRACTITIONER

## 2024-09-12 PROCEDURE — 3008F BODY MASS INDEX DOCD: CPT | Mod: CPTII,,, | Performed by: NURSE PRACTITIONER

## 2024-09-12 PROCEDURE — 1159F MED LIST DOCD IN RCRD: CPT | Mod: CPTII,,, | Performed by: NURSE PRACTITIONER

## 2024-09-16 ENCOUNTER — OFFICE VISIT (OUTPATIENT)
Dept: FAMILY MEDICINE | Facility: CLINIC | Age: 64
End: 2024-09-16
Payer: MEDICARE

## 2024-09-16 VITALS
HEART RATE: 101 BPM | RESPIRATION RATE: 18 BRPM | TEMPERATURE: 98 F | OXYGEN SATURATION: 100 % | HEIGHT: 59 IN | BODY MASS INDEX: 31.04 KG/M2 | WEIGHT: 154 LBS | SYSTOLIC BLOOD PRESSURE: 106 MMHG | DIASTOLIC BLOOD PRESSURE: 71 MMHG

## 2024-09-16 DIAGNOSIS — E83.52 HYPERCALCEMIA: Primary | ICD-10-CM

## 2024-09-16 DIAGNOSIS — R30.0 DYSURIA: ICD-10-CM

## 2024-09-16 LAB
BACTERIA #/AREA URNS HPF: ABNORMAL /HPF
BILIRUB UR QL STRIP: NEGATIVE
CLARITY UR: ABNORMAL
COLOR UR: YELLOW
GLUCOSE UR STRIP-MCNC: NORMAL MG/DL
KETONES UR STRIP-SCNC: NEGATIVE MG/DL
LEUKOCYTE ESTERASE UR QL STRIP: ABNORMAL
MUCOUS, UA: ABNORMAL /LPF
NITRITE UR QL STRIP: POSITIVE
PH UR STRIP: 5.5 PH UNITS
PROT UR QL STRIP: 50
RBC # UR STRIP: ABNORMAL /UL
RBC #/AREA URNS HPF: 153 /HPF
SP GR UR STRIP: 1.02
SQUAMOUS #/AREA URNS LPF: ABNORMAL /HPF
UROBILINOGEN UR STRIP-ACNC: NORMAL MG/DL
WBC #/AREA URNS HPF: >182 /HPF
WBC CLUMPS, UA: ABNORMAL /HPF

## 2024-09-16 PROCEDURE — 1159F MED LIST DOCD IN RCRD: CPT | Mod: ,,, | Performed by: INTERNAL MEDICINE

## 2024-09-16 PROCEDURE — 3044F HG A1C LEVEL LT 7.0%: CPT | Mod: ,,, | Performed by: INTERNAL MEDICINE

## 2024-09-16 PROCEDURE — 1126F AMNT PAIN NOTED NONE PRSNT: CPT | Mod: ,,, | Performed by: INTERNAL MEDICINE

## 2024-09-16 PROCEDURE — 3008F BODY MASS INDEX DOCD: CPT | Mod: ,,, | Performed by: INTERNAL MEDICINE

## 2024-09-16 PROCEDURE — 3288F FALL RISK ASSESSMENT DOCD: CPT | Mod: ,,, | Performed by: INTERNAL MEDICINE

## 2024-09-16 PROCEDURE — 3078F DIAST BP <80 MM HG: CPT | Mod: ,,, | Performed by: INTERNAL MEDICINE

## 2024-09-16 PROCEDURE — 3074F SYST BP LT 130 MM HG: CPT | Mod: ,,, | Performed by: INTERNAL MEDICINE

## 2024-09-16 PROCEDURE — 99213 OFFICE O/P EST LOW 20 MIN: CPT | Mod: ,,, | Performed by: INTERNAL MEDICINE

## 2024-09-16 PROCEDURE — 87086 URINE CULTURE/COLONY COUNT: CPT | Mod: ,,, | Performed by: CLINICAL MEDICAL LABORATORY

## 2024-09-16 PROCEDURE — 81001 URINALYSIS AUTO W/SCOPE: CPT | Mod: ,,, | Performed by: CLINICAL MEDICAL LABORATORY

## 2024-09-16 PROCEDURE — 1101F PT FALLS ASSESS-DOCD LE1/YR: CPT | Mod: ,,, | Performed by: INTERNAL MEDICINE

## 2024-09-16 PROCEDURE — 87186 SC STD MICRODIL/AGAR DIL: CPT | Mod: ,,, | Performed by: CLINICAL MEDICAL LABORATORY

## 2024-09-16 PROCEDURE — 87077 CULTURE AEROBIC IDENTIFY: CPT | Mod: ,,, | Performed by: CLINICAL MEDICAL LABORATORY

## 2024-09-16 RX ORDER — NITROFURANTOIN 25; 75 MG/1; MG/1
100 CAPSULE ORAL 2 TIMES DAILY
Qty: 14 CAPSULE | Refills: 0 | Status: SHIPPED | OUTPATIENT
Start: 2024-09-16

## 2024-09-18 PROBLEM — E83.52 HYPERCALCEMIA: Status: ACTIVE | Noted: 2024-09-18

## 2024-09-18 PROBLEM — R30.0 DYSURIA: Status: ACTIVE | Noted: 2024-09-18

## 2024-09-18 NOTE — PROGRESS NOTES
Subjective:       Patient ID: Faviola King is a 64 y.o. female.    Chief Complaint: Cystitis    HPI  .  Patient presents with a complain of dysuria and urinary frequency she also has suprapubic discomfort.    Current Medications:    Current Outpatient Medications:     albuterol (VENTOLIN HFA) 90 mcg/actuation inhaler, Inhale 2 puffs into the lungs 2 (two) times a day. Rescue, Disp: 18 g, Rfl: 6    amLODIPine (NORVASC) 10 MG tablet, Take 1 tablet (10 mg total) by mouth once daily., Disp: 90 tablet, Rfl: 1    atorvastatin (LIPITOR) 40 MG tablet, Take 1 tablet (40 mg total) by mouth once daily., Disp: 90 tablet, Rfl: 1    carvediloL (COREG) 6.25 MG tablet, Take 1 tablet (6.25 mg total) by mouth 2 (two) times daily., Disp: 180 tablet, Rfl: 0    cholecalciferol, vitamin D3, (VITAMIN D3) 125 mcg (5,000 unit) Tab, Take 1 tablet (5,000 Units total) by mouth 3 (three) times a week., Disp: 90 tablet, Rfl: 1    cholestyramine (QUESTRAN) 4 gram packet, Take 1 packet (4 g total) by mouth 3 (three) times daily with meals., Disp: 90 packet, Rfl: 1    HYDROcodone-acetaminophen (NORCO) 7.5-325 mg per tablet, Take 1 tablet by mouth every 6 (six) hours as needed for Pain., Disp: 15 tablet, Rfl: 0    ondansetron (ZOFRAN-ODT) 4 MG TbDL, Take 1 tablet (4 mg total) by mouth every 6 (six) hours as needed (nausea)., Disp: 20 tablet, Rfl: 0    potassium chloride SA (K-DUR,KLOR-CON) 20 MEQ tablet, TAKE 1  BY MOUTH TWICE DAILY, Disp: 4 tablet, Rfl: 0    scopolamine (TRANSDERM-SCOP) 1.3-1.5 mg (1 mg over 3 days), Place 1 patch onto the skin every 72 hours., Disp: 10 patch, Rfl: 0    nitrofurantoin, macrocrystal-monohydrate, (MACROBID) 100 MG capsule, Take 1 capsule (100 mg total) by mouth 2 (two) times daily., Disp: 14 capsule, Rfl: 0           ROS  Twelve point system reviewed, unremarkable except for stated above in HPI.        Objective:         Vitals:    09/16/24 1258   BP: 106/71   BP Location: Right arm   Patient Position: Sitting   BP  "Method: Large (Automatic)   Pulse: 101   Resp: 18   Temp: 97.7 °F (36.5 °C)   TempSrc: Temporal   SpO2: 100%   Weight: 69.9 kg (154 lb)   Height: 4' 11" (1.499 m)        Physical Exam     Patient is awake alert oriented person place and  Lungs are clear to auscultation bilaterally no crackles or wheezes   Cardiovascular S1-S2 regular rate and rhythm no murmurs rubs or gallops   Abdomen is soft positive bowel sounds nontender, extremities no clubbing cyanosis edema  Neuro no focal neurological deficits  Skin warm and dry.     Last Labs:     Office Visit on 09/16/2024   Component Date Value    Color, UA 09/16/2024 Yellow     Clarity, UA 09/16/2024 Ex.Turbid     pH, UA 09/16/2024 5.5     Leukocytes, UA 09/16/2024 Large (A)     Nitrites, UA 09/16/2024 Positive (A)     Protein, UA 09/16/2024 50 (A)     Glucose, UA 09/16/2024 Normal     Ketones, UA 09/16/2024 Negative     Urobilinogen, UA 09/16/2024 Normal     Bilirubin, UA 09/16/2024 Negative     Blood, UA 09/16/2024 Large (A)     Specific [place], UA 09/16/2024 1.024     WBC, UA 09/16/2024 >182 (H)     RBC, UA 09/16/2024 153 (H)     Bacteria, UA 09/16/2024 Moderate (A)     Squamous Epithelial Cell* 09/16/2024 Occasional (A)     WBC Clumps 09/16/2024 Moderate (A)     Mucous 09/16/2024 Occasional (A)     Culture, Urine 09/16/2024 >100,000 Gram-negative Bacilli (A)    Hospital Outpatient Visit on 09/11/2024   Component Date Value    LVOT stroke volume 09/11/2024 53.77     LVIDd 09/11/2024 3.9     LV Systolic Volume 09/11/2024 42.19     LVIDs 09/11/2024 3.24     LV ESV A2C 09/11/2024 47.02     LV Diastolic Volume 09/11/2024 60.46     LV ESV A4C 09/11/2024 28.67     Left Ventricular End Sys* 09/11/2024 42.19     Left Ventricular End Pauline* 09/11/2024 60.46     IVS 09/11/2024 0.7     LVOT diameter 09/11/2024 1.94     LVOT area 09/11/2024 3.0     FS 09/11/2024 17 (A)     Left Ventricle Relative * 09/11/2024 0.31     Posterior Wall 09/11/2024 0.6     LV mass 09/11/2024 68.23     " MV Peak E Ceferino 09/11/2024 0.55     TDI LATERAL 09/11/2024 0.08     TDI SEPTAL 09/11/2024 0.05     E/E' ratio 09/11/2024 8.46     MV Peak A Ceferino 09/11/2024 0.86     TR Max Ceferino 09/11/2024 2.65     E/A ratio 09/11/2024 0.64     E wave deceleration time 09/11/2024 316.73     LV SEPTAL E/E' RATIO 09/11/2024 11.00     LV LATERAL E/E' RATIO 09/11/2024 6.88     LVOT peak ceferino 09/11/2024 0.87     Left Ventricular Outflow* 09/11/2024 0.57     Left Ventricular Outflow* 09/11/2024 1.46     RV- mcclure basal diam 09/11/2024 2.5     RV-mcclure mid d 09/11/2024 2.0     Right ventricular length* 09/11/2024 5.94     RV-mcclure length 09/11/2024 5.9     RV mid diameter 09/11/2024 1.96     TAPSE 09/11/2024 2.05     LA volume (mod) 09/11/2024 36.69     RA area length vol 09/11/2024 33.61     RA area 09/11/2024 13.7     Right Atrium Volume Syst* 09/11/2024 35.63     AV mean gradient 09/11/2024 3     AV peak gradient 09/11/2024 6     Ao peak ceferino 09/11/2024 1.22     Ao VTI 09/11/2024 22.10     LVOT peak VTI 09/11/2024 18.20     AV valve area 09/11/2024 2.43     AV Velocity Ratio 09/11/2024 0.71     AV index (prosthetic) 09/11/2024 0.82     ALISA by Velocity Ratio 09/11/2024 2.11     MV stenosis pressure 1/2* 09/11/2024 91.85     MV valve area p 1/2 meth* 09/11/2024 2.40     Triscuspid Valve Regurgi* 09/11/2024 28     PV PEAK VELOCITY 09/11/2024 0.84     PV peak gradient 09/11/2024 3     Ao root annulus 09/11/2024 2.76     IVC diameter 09/11/2024 1.01     Mean e' 09/11/2024 0.07     LA area A4C 09/11/2024 13.54     LA area A2C 09/11/2024 16.93     AORTIC VALVE CUSP SEPERA* 09/11/2024 1.73     TV resting pulmonary art* 09/11/2024 31     RV TB RVSP 09/11/2024 6     Est. RA pres 09/11/2024 3    Office Visit on 09/05/2024   Component Date Value    Sodium 09/05/2024 142     Potassium 09/05/2024 4.4     Chloride 09/05/2024 108 (H)     CO2 09/05/2024 28     Anion Gap 09/05/2024 10     Glucose 09/05/2024 90     BUN 09/05/2024 13     Creatinine 09/05/2024 0.93      BUN/Creatinine Ratio 09/05/2024 14     Calcium 09/05/2024 10.2 (H)     Total Protein 09/05/2024 7.4     Albumin 09/05/2024 4.0     Globulin 09/05/2024 3.4     A/G Ratio 09/05/2024 1.2     Bilirubin, Total 09/05/2024 0.3     Alk Phos 09/05/2024 77     ALT 09/05/2024 22     AST 09/05/2024 15     eGFR 09/05/2024 68     Hepatitis B Surface Ag 09/05/2024 Non-Reactive     HBc Total Ab 09/05/2024 Negative     WBC 09/05/2024 9.90     RBC 09/05/2024 4.36     Hemoglobin 09/05/2024 13.0     Hematocrit 09/05/2024 39.9     MCV 09/05/2024 91.5     MCH 09/05/2024 29.8     MCHC 09/05/2024 32.6     RDW 09/05/2024 14.4     Platelet Count 09/05/2024 345     MPV 09/05/2024 10.2     Neutrophils % 09/05/2024 57.6     Lymphocytes % 09/05/2024 30.2     Monocytes % 09/05/2024 9.9 (H)     Eosinophils % 09/05/2024 1.2     Basophils % 09/05/2024 0.4     Immature Granulocytes % 09/05/2024 0.7 (H)     nRBC, Auto 09/05/2024 0.0     Neutrophils, Abs 09/05/2024 5.70     Lymphocytes, Absolute 09/05/2024 2.99     Monocytes, Absolute 09/05/2024 0.98 (H)     Eosinophils, Absolute 09/05/2024 0.12     Basophils, Absolute 09/05/2024 0.04     Immature Granulocytes, A* 09/05/2024 0.07 (H)     nRBC, Absolute 09/05/2024 0.00     Diff Type 09/05/2024 Auto    Admission on 08/26/2024, Discharged on 08/29/2024   Component Date Value    Sodium 08/26/2024 136     Potassium 08/26/2024 2.9 (L)     Chloride 08/26/2024 105     CO2 08/26/2024 25     Anion Gap 08/26/2024 9     Glucose 08/26/2024 132 (H)     BUN 08/26/2024 10     Creatinine 08/26/2024 0.72     BUN/Creatinine Ratio 08/26/2024 14     Calcium 08/26/2024 9.9     Total Protein 08/26/2024 8.7 (H)     Albumin 08/26/2024 4.2     Globulin 08/26/2024 4.5 (H)     A/G Ratio 08/26/2024 0.9     Bilirubin, Total 08/26/2024 0.4     Alk Phos 08/26/2024 81     ALT 08/26/2024 20     AST 08/26/2024 23     eGFR 08/26/2024 92     Lipase 08/26/2024 23     Color, UA 08/26/2024 Colorless     Clarity, UA 08/26/2024 Clear      pH, UA 08/26/2024 8.0     Leukocytes, UA 08/26/2024 Negative     Nitrites, UA 08/26/2024 Negative     Protein, UA 08/26/2024 30 (A)     Glucose, UA 08/26/2024 Normal     Ketones, UA 08/26/2024 Trace     Urobilinogen, UA 08/26/2024 Normal     Bilirubin, UA 08/26/2024 Negative     Blood, UA 08/26/2024 Negative     Specific Gravity, UA 08/26/2024 1.015     SARS COV-2 Molecular 08/26/2024 Negative     WBC 08/26/2024 8.29     RBC 08/26/2024 5.15     Hemoglobin 08/26/2024 14.8     Hematocrit 08/26/2024 46.4     MCV 08/26/2024 90.1     MCH 08/26/2024 28.7     MCHC 08/26/2024 31.9 (L)     RDW 08/26/2024 14.2     Platelet Count 08/26/2024 295     MPV 08/26/2024 9.7     Neutrophils % 08/26/2024 79.1 (H)     Lymphocytes % 08/26/2024 16.8 (L)     Monocytes % 08/26/2024 3.4     Eosinophils % 08/26/2024 0.1 (L)     Basophils % 08/26/2024 0.1     Immature Granulocytes % 08/26/2024 0.5 (H)     nRBC, Auto 08/26/2024 0.0     Neutrophils, Abs 08/26/2024 6.56     Lymphocytes, Absolute 08/26/2024 1.39     Monocytes, Absolute 08/26/2024 0.28     Eosinophils, Absolute 08/26/2024 0.01     Basophils, Absolute 08/26/2024 0.01     Immature Granulocytes, A* 08/26/2024 0.04     nRBC, Absolute 08/26/2024 0.00     Diff Type 08/26/2024 Auto     WBC, UA 08/26/2024 1     RBC, UA 08/26/2024 5 (H)     Bacteria, UA 08/26/2024 Occasional (A)     Squamous Epithelial Cell* 08/26/2024 Occasional (A)     Mucous 08/26/2024 Occasional (A)     Hemoglobin A1C 08/26/2024 5.6     Estimated Average Glucose 08/26/2024 114     Sodium 08/27/2024 136     Potassium 08/27/2024 3.2 (L)     Chloride 08/27/2024 105     CO2 08/27/2024 26     Anion Gap 08/27/2024 8     Glucose 08/27/2024 96     BUN 08/27/2024 10     Creatinine 08/27/2024 0.94     BUN/Creatinine Ratio 08/27/2024 11     Calcium 08/27/2024 9.2     Total Protein 08/27/2024 7.2     Albumin 08/27/2024 3.5     Globulin 08/27/2024 3.7     A/G Ratio 08/27/2024 0.9     Bilirubin, Total 08/27/2024 0.3     Alk Phos  08/27/2024 68     ALT 08/27/2024 20     AST 08/27/2024 21     eGFR 08/27/2024 67     Case Report 08/27/2024                      Value:Surgical Pathology                                Case: [id number]                                   Authorizing Provider:  Rickey Santizo MD       Collected:           08/27/2024 11:53 AM          Ordering Location:     [facility] -     Received:            08/27/2024 12:23 PM                                 Periop Services                                                              Pathologist:           Ervin Choe MD                                                      Specimen:    Gallbladder, gallbladder                                                                   Final Diagnosis 08/27/2024                      Value:This result contains rich text formatting which cannot be displayed here.    Gross Description 08/27/2024                      Value:This result contains rich text formatting which cannot be displayed here.    Microscopic Description 08/27/2024                      Value:This result contains rich text formatting which cannot be displayed here.    Laboratory Notes 08/27/2024                      Value:This result contains rich text formatting which cannot be displayed here.    Sodium 08/29/2024 131 (L)     Potassium 08/29/2024 3.1 (L)     Chloride 08/29/2024 97 (L)     CO2 08/29/2024 28     Anion Gap 08/29/2024 9     Glucose 08/29/2024 119 (H)     BUN 08/29/2024 8     Creatinine 08/29/2024 0.79     BUN/Creatinine Ratio 08/29/2024 10     Calcium 08/29/2024 9.8     Total Protein 08/29/2024 8.6 (H)     Albumin 08/29/2024 4.1     Globulin 08/29/2024 4.5 (H)     A/G Ratio 08/29/2024 0.9     Bilirubin, Total 08/29/2024 0.5     Alk Phos 08/29/2024 86     ALT 08/29/2024 39     AST 08/29/2024 30     eGFR 08/29/2024 83     WBC 08/29/2024 9.75     RBC 08/29/2024 5.74 (H)     Hemoglobin 08/29/2024 16.8 (H)     Hematocrit 08/29/2024 50.0 (H)     MCV  08/29/2024 87.1     MCH 08/29/2024 29.3     MCHC 08/29/2024 33.6     RDW 08/29/2024 13.8     Platelet Count 08/29/2024 276     MPV 08/29/2024 9.8     Neutrophils % 08/29/2024 60.8     Lymphocytes % 08/29/2024 27.9     Monocytes % 08/29/2024 10.6 (H)     Eosinophils % 08/29/2024 0.1 (L)     Basophils % 08/29/2024 0.3     Immature Granulocytes % 08/29/2024 0.3     nRBC, Auto 08/29/2024 0.0     Neutrophils, Abs 08/29/2024 5.93     Lymphocytes, Absolute 08/29/2024 2.72     Monocytes, Absolute 08/29/2024 1.03 (H)     Eosinophils, Absolute 08/29/2024 0.01     Basophils, Absolute 08/29/2024 0.03     Immature Granulocytes, A* 08/29/2024 0.03     nRBC, Absolute 08/29/2024 0.00     Diff Type 08/29/2024 Auto    Office Visit on 08/22/2024   Component Date Value    QRS Duration 08/22/2024 100     [facility] QTC Calculation 08/22/2024 427        Last Imaging:  Echo    Left Ventricle: The left ventricle is normal in size. Normal wall   thickness. There is normal systolic function with a visually estimated   ejection fraction of 55 - 60%. There is normal diastolic function.    Right Ventricle: Normal right ventricular cavity size. Systolic   function is normal.    Left Atrium: Left atrium is dilated.    Aortic Valve: The aortic valve is a trileaflet valve.    Pulmonary Artery: The estimated pulmonary artery systolic pressure is   31 mmHg.    IVC/SVC: Normal venous pressure at 3 mmHg.         **Labs and x-rays personally reviewed by me    ** reviewed           Assessment & Plan:         1 Dysuria  -     Urinalysis, Reflex to Urine Culture  -     Urinalysis, Microscopic  -     Urine culture  -     nitrofurantoin, macrocrystal-monohydrate, (MACROBID) 100 MG capsule; Take 1 capsule (100 mg total) by mouth 2 (two) times daily.  Dispense: 14 capsule; Refill: 0            Shantanu Larson MD

## 2024-09-19 LAB — UA COMPLETE W REFLEX CULTURE PNL UR: ABNORMAL

## 2024-09-19 NOTE — PROGRESS NOTES
Post-operative Note    HPI:  The patient is status post laparoscopic cholecystectomy.  No complications.  Tolerating diet without nausea, vomiting, abdominal pain, or diarrhea.    PHYSICAL EXAM:    Abdomen soft, nontender, nondistended.  Laparoscopic incisions well approximated without erythema or drainage.     Recent Results (from the past 504 hour(s))   Comprehensive Metabolic Panel    Collection Time: 09/05/24 12:04 PM   Result Value Ref Range    Sodium 142 136 - 145 mmol/L    Potassium 4.4 3.5 - 5.1 mmol/L    Chloride 108 (H) 98 - 107 mmol/L    CO2 28 21 - 32 mmol/L    Anion Gap 10 7 - 16 mmol/L    Glucose 90 74 - 106 mg/dL    BUN 13 7 - 18 mg/dL    Creatinine 0.93 0.55 - 1.02 mg/dL    BUN/Creatinine Ratio 14 6 - 20    Calcium 10.2 (H) 8.5 - 10.1 mg/dL    Total Protein 7.4 6.4 - 8.2 g/dL    Albumin 4.0 3.5 - 5.0 g/dL    Globulin 3.4 2.0 - 4.0 g/dL    A/G Ratio 1.2     Bilirubin, Total 0.3 >0.0 - 1.2 mg/dL    Alk Phos 77 55 - 142 U/L    ALT 22 13 - 56 U/L    AST 15 15 - 37 U/L    eGFR 68 >=60 mL/min/1.73m2   Hepatitis B Surface Antigen    Collection Time: 09/05/24 12:04 PM   Result Value Ref Range    Hepatitis B Surface Ag Non-Reactive Non-Reactive   Hepatitis B Core Antibody, Total    Collection Time: 09/05/24 12:04 PM   Result Value Ref Range    HBc Total Ab Negative Negative   CBC with Differential    Collection Time: 09/05/24 12:04 PM   Result Value Ref Range    WBC 9.90 4.50 - 11.00 K/uL    RBC 4.36 4.20 - 5.40 M/uL    Hemoglobin 13.0 12.0 - 16.0 g/dL    Hematocrit 39.9 38.0 - 47.0 %    MCV 91.5 80.0 - 96.0 fL    MCH 29.8 27.0 - 31.0 pg    MCHC 32.6 32.0 - 36.0 g/dL    RDW 14.4 11.5 - 14.5 %    Platelet Count 345 150 - 400 K/uL    MPV 10.2 9.4 - 12.4 fL    Neutrophils % 57.6 53.0 - 65.0 %    Lymphocytes % 30.2 27.0 - 41.0 %    Monocytes % 9.9 (H) 2.0 - 6.0 %    Eosinophils % 1.2 1.0 - 4.0 %    Basophils % 0.4 0.0 - 1.0 %     Immature Granulocytes % 0.7 (H) 0.0 - 0.4 %    nRBC, Auto 0.0 <=0.0 %    Neutrophils, Abs 5.70 1.80 - 7.70 K/uL    Lymphocytes, Absolute 2.99 1.00 - 4.80 K/uL    Monocytes, Absolute 0.98 (H) 0.00 - 0.80 K/uL    Eosinophils, Absolute 0.12 0.00 - 0.50 K/uL    Basophils, Absolute 0.04 0.00 - 0.20 K/uL    Immature Granulocytes, Absolute 0.07 (H) 0.00 - 0.04 K/uL    nRBC, Absolute 0.00 <=0.00 x10e3/uL    Diff Type Auto    Echo    Collection Time: 09/11/24  2:47 PM   Result Value Ref Range    LVOT stroke volume 53.77 cm3    LVIDd 3.9 3.5 - 6.0 cm    LV Systolic Volume 42.19 mL    LVIDs 3.24 2.1 - 4.0 cm    LV ESV A2C 47.02 mL    LV Diastolic Volume 60.46 mL    LV ESV A4C 28.67 mL    Left Ventricular End Systolic Volume by Teichholz Method 42.19 mL    Left Ventricular End Diastolic Volume by Teichholz Method 60.46 mL    IVS 0.7 0.6 - 1.1 cm    LVOT diameter 1.94 cm    LVOT area 3.0 cm2    FS 17 (A) 28 - 44 %    Left Ventricle Relative Wall Thickness 0.31 cm    Posterior Wall 0.6 0.6 - 1.1 cm    LV mass 68.23 g    MV Peak E Ceferino 0.55 m/s    TDI LATERAL 0.08 m/s    TDI SEPTAL 0.05 m/s    E/E' ratio 8.46 m/s    MV Peak A Ceferino 0.86 m/s    TR Max Ceferino 2.65 m/s    E/A ratio 0.64     E wave deceleration time 316.73 msec    LV SEPTAL E/E' RATIO 11.00 m/s    LV LATERAL E/E' RATIO 6.88 m/s    LVOT peak ceferino 0.87 m/s    Left Ventricular Outflow Tract Mean Velocity 0.57 cm/s    Left Ventricular Outflow Tract Mean Gradient 1.46 mmHg    RV- mcclure basal diam 2.5 cm    RV-mcclure mid d 2.0 cm    Right ventricular length in diastole (apical 4-chamber view) 5.94 cm    RV-mcclure length 5.9 cm    RV mid diameter 1.96 cm    TAPSE 2.05 cm    LA volume (mod) 36.69 cm3    RA area length vol 33.61 mL    RA area 13.7 cm2    Right Atrium Volume Systolic 35.63 mL    AV mean gradient 3 mmHg    AV peak gradient 6 mmHg    Ao peak ceferino 1.22 m/s    Ao VTI 22.10 cm    LVOT peak VTI 18.20 cm    AV valve area 2.43 cm²    AV Velocity Ratio 0.71     AV index (prosthetic)  0.82     ALISA by Velocity Ratio 2.11 cm²    MV stenosis pressure 1/2 time 91.85 ms    MV valve area p 1/2 method 2.40 cm2    Triscuspid Valve Regurgitation Peak Gradient 28 mmHg    PV PEAK VELOCITY 0.84 m/s    PV peak gradient 3 mmHg    Ao root annulus 2.76 cm    IVC diameter 1.01 cm    Mean e' 0.07 m/s    LA area A4C 13.54 cm2    LA area A2C 16.93 cm2    AORTIC VALVE CUSP SEPERATION 1.73 cm    TV resting pulmonary artery pressure 31 mmHg    RV TB RVSP 6 mmHg    Est. RA pres 3 mmHg   Urinalysis, Reflex to Urine Culture    Collection Time: 09/16/24  1:31 PM    Specimen: Urine   Result Value Ref Range    Color, UA Yellow Colorless, Straw, Yellow, Light Yellow, Dark Yellow    Clarity, UA Ex.Turbid Clear    pH, UA 5.5 5.0 to 8.0 pH Units    Leukocytes, UA Large (A) Negative    Nitrites, UA Positive (A) Negative    Protein, UA 50 (A) Negative    Glucose, UA Normal Normal mg/dL    Ketones, UA Negative Negative mg/dL    Urobilinogen, UA Normal 0.2, 1.0, Normal mg/dL    Bilirubin, UA Negative Negative    Blood, UA Large (A) Negative    Specific Gravity, UA 1.024 <=1.030   Urinalysis, Microscopic    Collection Time: 09/16/24  1:31 PM   Result Value Ref Range    WBC, UA >182 (H) <=5 /hpf    RBC,  (H) <=3 /hpf    Bacteria, UA Moderate (A) None Seen /hpf    Squamous Epithelial Cells, UA Occasional (A) None Seen /HPF    WBC Clumps Moderate (A) None Seen /hpf    Mucous Occasional (A) None Seen /LPF   Urine culture    Collection Time: 09/16/24  1:31 PM    Specimen: Urine   Result Value Ref Range    Culture, Urine >100,000 Escherichia coli (A)        Susceptibility    Escherichia coli -  (no method available)     Ampicillin <=8 Sensitive µg/ml     Ampicillin/Sulbactam <=8/4 Sensitive µg/ml     Aztreonam <=4 Sensitive µg/ml     Cefazolin <=2 Sensitive µg/ml     Cefepime <=2 Sensitive µg/ml     Ceftriaxone <=1 Sensitive µg/ml     Ciprofloxacin <=0.25 Sensitive µg/ml     Ertapenem <=0.5 Sensitive µg/ml     Gentamicin <=2  Sensitive µg/ml     Meropenem <=1 Sensitive µg/ml     Nitrofurantoin <=32 Sensitive µg/ml     Piperacillin/Tazobactam <=8 Sensitive µg/ml     Tetracycline <=4 Sensitive µg/ml     Tobramycin <=2 Sensitive µg/ml     Trimeth/Sulfa >2/38 Resistant µg/ml     Ceftazidime <=1 Sensitive µg/ml     Ceftazidime/Avibactam <=8 Sensitive µg/ml        ASSESSMENT:      The patient is doing well after surgery.       PLAN:      Follow up PRN.

## 2024-10-07 NOTE — PROGRESS NOTES
"  Faviola King presented for a  Medicare AWV and comprehensive Health Risk Assessment today. The following components were reviewed and updated:    Medical history  Family History  Social history  Allergies and Current Medications  Health Risk Assessment  Health Maintenance  Care Team         ** See Completed Assessments for Annual Wellness Visit within the encounter summary.**         The following assessments were completed:  Living Situation  CAGE  Depression Screening  Timed Get Up and Go  Whisper Test  Cognitive Function Screening  Nutrition Screening  ADL Screening  PAQ Screening      Opioid documentation:      Patient does not have a current opioid prescription.        Vitals:    10/14/24 1139   BP: 120/80   Pulse: 80   Resp: 16   Temp: 99 °F (37.2 °C)   TempSrc: Oral   SpO2: 98%   Weight: 68.5 kg (151 lb)   Height: 4' 11" (1.499 m)     Body mass index is 30.5 kg/m².  Physical Exam  Constitutional:       General: She is not in acute distress.     Appearance: Normal appearance. She is obese.   HENT:      Head: Normocephalic.      Mouth/Throat:      Mouth: Mucous membranes are moist.   Cardiovascular:      Rate and Rhythm: Normal rate and regular rhythm.      Pulses: Normal pulses.      Heart sounds: Normal heart sounds.   Pulmonary:      Effort: Pulmonary effort is normal. No respiratory distress.      Breath sounds: Normal breath sounds.   Abdominal:      Palpations: Abdomen is soft.      Tenderness: There is no abdominal tenderness.   Musculoskeletal:         General: Normal range of motion.   Skin:     General: Skin is warm and dry.   Neurological:      Mental Status: She is alert and oriented to person, place, and time.             Diagnoses and health risks identified today and associated recommendations/orders:    1. Essential (primary) hypertension  Chronic - Stable - Followed up by PCP  B/P 120/80  Continue heart healthy diet intake   Continue antihypertensive medications as prescribed - Lipid Panel; " Future    2. Lung mass  Chronic - Stable  Followed by PCP  Referral to surgery and oncology hematology    3. Gastroesophageal reflux disease, unspecified whether esophagitis present  Chronic - Stable - Followed by PCP    4. Osteoporosis, unspecified osteoporosis type, unspecified pathological fracture presence  Chronic - Stable  Followed by PCP    5. Hypokalemia  Stable - Followed by PCP  K+ level 2.9  Taking K 20 meq TID until repeat lab    6. Encounter for initial annual wellness visit (AWV) in Medicare patient  Screenings performed, as noted above. Personal preventative testing needs reviewed.  Return for AWV in 12 months or thereafter       7. Other reduced mobility  Chronic - Stable  Followed by PCP  No falls reported      8. BMI 30.0-30.9,adult  Eat a well-balanced diet to include  vegetables, fruit, lean meats, and whole grains.  Activity as tolerated  Followed by PCP    9. Obesity, unspecified class, unspecified obesity type, unspecified whether serious comorbidity present  Eat a well-balanced diet to include  vegetables, fruit, lean meats, and whole grains.  Activity as tolerated  Followed by PCP      Provided Faviola with a 5-10 year written screening schedule and personal prevention plan. Recommendations were developed using the USPSTF age appropriate recommendations. Education, counseling, and referrals were provided as needed. After Visit Summary printed and given to patient which includes a list of additional screenings\tests needed.    Follow up in about 1 year (around 10/14/2025).    DEEDEE Alcaraz    Covid vaccine - declined, Influenza vaccine - declined, Pneumonia vaccine - declined, Tetanus vaccine - declined, Shingles vaccine - declined, RSV vaccine - declined, Lipid panel - ordered this visit.    I offered to discuss advanced care planning, including how to pick a person who would make decisions for you if you were unable to make them for yourself, called a health care power of , and  what kind of decisions you might make such as use of life sustaining treatments such as ventilators and tube feeding when faced with a life limiting illness recorded on a living will that they will need to know. (How you want to be cared for as you near the end of your natural life)     X  Patient is unwilling to engage in a discussion regarding advance directives at this time.

## 2024-10-14 ENCOUNTER — OFFICE VISIT (OUTPATIENT)
Dept: FAMILY MEDICINE | Facility: CLINIC | Age: 64
End: 2024-10-14
Payer: MEDICARE

## 2024-10-14 VITALS
RESPIRATION RATE: 16 BRPM | OXYGEN SATURATION: 98 % | DIASTOLIC BLOOD PRESSURE: 80 MMHG | HEART RATE: 80 BPM | SYSTOLIC BLOOD PRESSURE: 120 MMHG | BODY MASS INDEX: 30.44 KG/M2 | TEMPERATURE: 99 F | WEIGHT: 151 LBS | HEIGHT: 59 IN

## 2024-10-14 DIAGNOSIS — M81.0 OSTEOPOROSIS, UNSPECIFIED OSTEOPOROSIS TYPE, UNSPECIFIED PATHOLOGICAL FRACTURE PRESENCE: ICD-10-CM

## 2024-10-14 DIAGNOSIS — Z74.09 OTHER REDUCED MOBILITY: ICD-10-CM

## 2024-10-14 DIAGNOSIS — E66.9 OBESITY, UNSPECIFIED CLASS, UNSPECIFIED OBESITY TYPE, UNSPECIFIED WHETHER SERIOUS COMORBIDITY PRESENT: ICD-10-CM

## 2024-10-14 DIAGNOSIS — I10 ESSENTIAL (PRIMARY) HYPERTENSION: ICD-10-CM

## 2024-10-14 DIAGNOSIS — R91.8 LUNG MASS: ICD-10-CM

## 2024-10-14 DIAGNOSIS — K21.9 GASTROESOPHAGEAL REFLUX DISEASE, UNSPECIFIED WHETHER ESOPHAGITIS PRESENT: ICD-10-CM

## 2024-10-14 DIAGNOSIS — E87.6 HYPOKALEMIA: ICD-10-CM

## 2024-10-14 DIAGNOSIS — Z00.00 ENCOUNTER FOR INITIAL ANNUAL WELLNESS VISIT (AWV) IN MEDICARE PATIENT: Primary | ICD-10-CM

## 2024-10-14 PROCEDURE — 1159F MED LIST DOCD IN RCRD: CPT | Mod: ,,, | Performed by: NURSE PRACTITIONER

## 2024-10-14 PROCEDURE — G0438 PPPS, INITIAL VISIT: HCPCS | Mod: ,,, | Performed by: NURSE PRACTITIONER

## 2024-10-14 PROCEDURE — 1160F RVW MEDS BY RX/DR IN RCRD: CPT | Mod: ,,, | Performed by: NURSE PRACTITIONER

## 2024-10-14 PROCEDURE — 3044F HG A1C LEVEL LT 7.0%: CPT | Mod: ,,, | Performed by: NURSE PRACTITIONER

## 2024-10-14 PROCEDURE — 1101F PT FALLS ASSESS-DOCD LE1/YR: CPT | Mod: ,,, | Performed by: NURSE PRACTITIONER

## 2024-10-14 PROCEDURE — 1124F ACP DISCUSS-NO DSCNMKR DOCD: CPT | Mod: ,,, | Performed by: NURSE PRACTITIONER

## 2024-10-14 PROCEDURE — 3079F DIAST BP 80-89 MM HG: CPT | Mod: ,,, | Performed by: NURSE PRACTITIONER

## 2024-10-14 PROCEDURE — 3074F SYST BP LT 130 MM HG: CPT | Mod: ,,, | Performed by: NURSE PRACTITIONER

## 2024-10-14 PROCEDURE — 3288F FALL RISK ASSESSMENT DOCD: CPT | Mod: ,,, | Performed by: NURSE PRACTITIONER

## 2024-10-14 PROCEDURE — 1126F AMNT PAIN NOTED NONE PRSNT: CPT | Mod: ,,, | Performed by: NURSE PRACTITIONER

## 2024-10-14 RX ORDER — FLUTICASONE FUROATE 27.5 UG/1
2 SPRAY, METERED NASAL DAILY
Qty: 9.1 ML | Refills: 1 | Status: SHIPPED | OUTPATIENT
Start: 2024-10-14

## 2024-10-14 RX ORDER — CETIRIZINE HYDROCHLORIDE 10 MG/1
10 TABLET ORAL NIGHTLY
Qty: 30 TABLET | Refills: 1 | Status: SHIPPED | OUTPATIENT
Start: 2024-10-14 | End: 2025-10-14

## 2024-10-14 NOTE — PATIENT INSTRUCTIONS
Counseling and Referral of Other Preventative  (Italic type indicates deductible and co-insurance are waived)    Patient Name: Faviola King  Today's Date: 10/14/2024    Health Maintenance       Date Due Completion Date    Pneumococcal Vaccines (Age 65+) (1 of 2 - PCV) Never done ---    TETANUS VACCINE Never done ---    Shingles Vaccine (1 of 2) Never done ---    RSV Vaccine (Age 60+ and Pregnant patients) (1 - Risk 60-74 years 1-dose series) Never done ---    COVID-19 Vaccine (3 - Mixed Product risk series) 09/29/2021 9/1/2021    Influenza Vaccine (1) Never done ---    Mammogram 11/27/2024 11/27/2023    Colorectal Cancer Screening 08/10/2025 8/10/2022    High Dose Statin 09/16/2025 9/16/2024    DEXA Scan 02/12/2027 2/12/2024    Lipid Panel 05/24/2027 5/24/2022        No orders of the defined types were placed in this encounter.    The following information is provided to all patients.  This information is to help you find resources for any of the problems found today that may be affecting your health:                  Living healthy guide: ms.gov    Understanding Diabetes: www.diabetes.org      Eating healthy: www.cdc.gov/healthyweight      CDC home safety checklist: www.cdc.gov/steadi/patient.html      Agency on Aging: ms.gov    Alcoholics anonymous (AA): www.aa.org      Physical Activity: www.linsey.nih.gov/pu4mjfa      Tobacco use: ms.gov

## 2024-10-17 ENCOUNTER — LAB VISIT (OUTPATIENT)
Dept: LAB | Facility: CLINIC | Age: 64
End: 2024-10-17
Payer: MEDICARE

## 2024-10-17 DIAGNOSIS — E83.52 HYPERCALCEMIA: ICD-10-CM

## 2024-10-17 DIAGNOSIS — I10 ESSENTIAL (PRIMARY) HYPERTENSION: ICD-10-CM

## 2024-10-17 LAB
ANION GAP SERPL CALCULATED.3IONS-SCNC: 9 MMOL/L (ref 7–16)
BUN SERPL-MCNC: 14 MG/DL (ref 7–18)
BUN/CREAT SERPL: 18 (ref 6–20)
CALCIUM SERPL-MCNC: 10.1 MG/DL (ref 8.5–10.1)
CHLORIDE SERPL-SCNC: 107 MMOL/L (ref 98–107)
CHOLEST SERPL-MCNC: 189 MG/DL (ref 0–200)
CHOLEST/HDLC SERPL: 4.1 {RATIO}
CO2 SERPL-SCNC: 29 MMOL/L (ref 21–32)
CREAT SERPL-MCNC: 0.76 MG/DL (ref 0.55–1.02)
EGFR (NO RACE VARIABLE) (RUSH/TITUS): 87 ML/MIN/1.73M2
GLUCOSE SERPL-MCNC: 95 MG/DL (ref 74–106)
HDLC SERPL-MCNC: 46 MG/DL (ref 40–60)
LDLC SERPL CALC-MCNC: 103 MG/DL
LDLC/HDLC SERPL: 2.2 {RATIO}
NONHDLC SERPL-MCNC: 143 MG/DL
POTASSIUM SERPL-SCNC: 2.9 MMOL/L (ref 3.5–5.1)
SODIUM SERPL-SCNC: 142 MMOL/L (ref 136–145)
TRIGL SERPL-MCNC: 201 MG/DL (ref 35–150)
VLDLC SERPL-MCNC: 40 MG/DL

## 2024-10-17 PROCEDURE — 80048 BASIC METABOLIC PNL TOTAL CA: CPT | Mod: ,,, | Performed by: CLINICAL MEDICAL LABORATORY

## 2024-10-17 PROCEDURE — 80061 LIPID PANEL: CPT | Mod: ,,, | Performed by: CLINICAL MEDICAL LABORATORY

## 2024-10-17 RX ORDER — POTASSIUM CHLORIDE 20 MEQ/1
20 TABLET, EXTENDED RELEASE ORAL 3 TIMES DAILY
Qty: 9 TABLET | Refills: 0 | Status: SHIPPED | OUTPATIENT
Start: 2024-10-17

## 2024-10-17 NOTE — TELEPHONE ENCOUNTER
----- Message from Shantanu Larson MD sent at 10/17/2024  3:49 PM CDT -----  Kcl 20meq po tid x 3  days      k level in 1 week     1554 Rx sent to pharmacy on file

## 2024-11-25 ENCOUNTER — OFFICE VISIT (OUTPATIENT)
Dept: CARDIOLOGY | Facility: CLINIC | Age: 64
End: 2024-11-25
Payer: MEDICARE

## 2024-11-25 VITALS
SYSTOLIC BLOOD PRESSURE: 130 MMHG | OXYGEN SATURATION: 99 % | BODY MASS INDEX: 29.84 KG/M2 | HEIGHT: 59 IN | HEART RATE: 89 BPM | DIASTOLIC BLOOD PRESSURE: 90 MMHG | WEIGHT: 148 LBS

## 2024-11-25 DIAGNOSIS — I50.32 HEART FAILURE WITH IMPROVED EJECTION FRACTION (HFIMPEF): Primary | ICD-10-CM

## 2024-11-25 PROCEDURE — 99999 PR PBB SHADOW E&M-EST. PATIENT-LVL V: CPT | Mod: PBBFAC,,, | Performed by: STUDENT IN AN ORGANIZED HEALTH CARE EDUCATION/TRAINING PROGRAM

## 2024-11-25 PROCEDURE — 1101F PT FALLS ASSESS-DOCD LE1/YR: CPT | Mod: CPTII,,, | Performed by: STUDENT IN AN ORGANIZED HEALTH CARE EDUCATION/TRAINING PROGRAM

## 2024-11-25 PROCEDURE — 3288F FALL RISK ASSESSMENT DOCD: CPT | Mod: CPTII,,, | Performed by: STUDENT IN AN ORGANIZED HEALTH CARE EDUCATION/TRAINING PROGRAM

## 2024-11-25 PROCEDURE — 3008F BODY MASS INDEX DOCD: CPT | Mod: CPTII,,, | Performed by: STUDENT IN AN ORGANIZED HEALTH CARE EDUCATION/TRAINING PROGRAM

## 2024-11-25 PROCEDURE — 1159F MED LIST DOCD IN RCRD: CPT | Mod: CPTII,,, | Performed by: STUDENT IN AN ORGANIZED HEALTH CARE EDUCATION/TRAINING PROGRAM

## 2024-11-25 PROCEDURE — 1126F AMNT PAIN NOTED NONE PRSNT: CPT | Mod: CPTII,,, | Performed by: STUDENT IN AN ORGANIZED HEALTH CARE EDUCATION/TRAINING PROGRAM

## 2024-11-25 PROCEDURE — 3044F HG A1C LEVEL LT 7.0%: CPT | Mod: CPTII,,, | Performed by: STUDENT IN AN ORGANIZED HEALTH CARE EDUCATION/TRAINING PROGRAM

## 2024-11-25 PROCEDURE — 3080F DIAST BP >= 90 MM HG: CPT | Mod: CPTII,,, | Performed by: STUDENT IN AN ORGANIZED HEALTH CARE EDUCATION/TRAINING PROGRAM

## 2024-11-25 PROCEDURE — 99213 OFFICE O/P EST LOW 20 MIN: CPT | Mod: S$PBB,,, | Performed by: STUDENT IN AN ORGANIZED HEALTH CARE EDUCATION/TRAINING PROGRAM

## 2024-11-25 PROCEDURE — 99215 OFFICE O/P EST HI 40 MIN: CPT | Mod: PBBFAC | Performed by: STUDENT IN AN ORGANIZED HEALTH CARE EDUCATION/TRAINING PROGRAM

## 2024-11-25 PROCEDURE — 3075F SYST BP GE 130 - 139MM HG: CPT | Mod: CPTII,,, | Performed by: STUDENT IN AN ORGANIZED HEALTH CARE EDUCATION/TRAINING PROGRAM

## 2024-11-25 NOTE — PROGRESS NOTES
PCP: Shantanu Larson MD    Referring Provider:     Subjective:   Faviola King is a 64 y.o. female  with a history of systolic heart failure  secondary to stress-induced cardiomyopathy who presents for follow-up.    11/25/24:   Doing well.  No cardiac complaints today.    8/22/24:  patient was lost to follow up over the last year.  Referred back to establish care with Cardiology. Doing well.  Underwent lung surgery without any complications.  No cardiac complaints. Denies chest pain/tightness, dyspnea, palpitations, lightheadedness or syncope.     7/18/23: Presents for new patient visit for evaluation of abnormal EKG. Last office note from previous EMR reviewed.  Last seen by Dr. Lloyd on 6/12/2019.  She has a history of systolic heart failure LVEF 35% secondary to stress-induced cardiomyopathy.  Left heart catheterization in September 2018 showed nonobstructive CAD.  It appears that the patient did not follow up with Cardiology after.  She is doing well.  Denies chest pain, shortness of breath, orthopnea, leg swelling, palpitations, lightheadedness or syncopal episodes.  She is active at baseline.  She works at a .  She is easily able to achieve 4 Mets on a daily basis without any anginal or anginal equivalent symptoms.  Of note, she was recently diagnosed with lung cancer and assess scheduled to undergo lung surgery at [facility] this coming Friday    Fhx:  Mother (heart failure, hypertension), father (hypertension)  Shx:  40 pack-year smoking history. Quit in 2023 after lung cancer diagnosis.    EKG  8/22/24:   Sinus rhythm with PACs, low-voltage QRS, anterior infarct, age undetermined  7/18/23: Normal sinus rhythm, anterior infarct (age undetermined)    ECHO  Results for orders placed during the hospital encounter of 09/11/24    Echo    Interpretation Summary    Left Ventricle: The left ventricle is normal in size. Normal wall thickness. There is normal systolic function with a visually estimated  ejection fraction of 55 - 60%. There is normal diastolic function.    Right Ventricle: Normal right ventricular cavity size. Systolic function is normal.    Left Atrium: Left atrium is dilated.    Aortic Valve: The aortic valve is a trileaflet valve.    Pulmonary Artery: The estimated pulmonary artery systolic pressure is 31 mmHg.    IVC/SVC: Normal venous pressure at 3 mmHg.       CATH: No results found for this or any previous visit.     Lab Results   Component Value Date     10/17/2024    K 2.9 (L) 10/17/2024     10/17/2024    CO2 29 10/17/2024    BUN 14 10/17/2024    CREATININE 0.76 10/17/2024    CALCIUM 10.1 10/17/2024    ANIONGAP 9 10/17/2024    EGFRNONAA 72 07/03/2022       Lab Results   Component Value Date    CHOL 189 10/17/2024    CHOL 198 05/24/2022     Lab Results   Component Value Date    HDL 46 10/17/2024    HDL 41 05/24/2022     Lab Results   Component Value Date    LDLCALC 103 10/17/2024    LDLCALC 109 05/24/2022     Lab Results   Component Value Date    TRIG 201 (H) 10/17/2024    TRIG 240 (H) 05/24/2022     Lab Results   Component Value Date    CHOLHDL 4.1 10/17/2024    CHOLHDL 4.8 05/24/2022       Lab Results   Component Value Date    WBC 9.90 09/05/2024    HGB 13.0 09/05/2024    HCT 39.9 09/05/2024    MCV 91.5 09/05/2024     09/05/2024           Current Outpatient Medications:     albuterol (VENTOLIN HFA) 90 mcg/actuation inhaler, Inhale 2 puffs into the lungs 2 (two) times a day. Rescue, Disp: 18 g, Rfl: 6    amLODIPine (NORVASC) 10 MG tablet, Take 1 tablet (10 mg total) by mouth once daily., Disp: 90 tablet, Rfl: 1    atorvastatin (LIPITOR) 40 MG tablet, Take 1 tablet (40 mg total) by mouth once daily., Disp: 90 tablet, Rfl: 1    cetirizine (ZYRTEC) 10 MG tablet, Take 1 tablet (10 mg total) by mouth every evening., Disp: 30 tablet, Rfl: 1    cholecalciferol, vitamin D3, (VITAMIN D3) 125 mcg (5,000 unit) Tab, Take 1 tablet (5,000 Units total) by mouth 3 (three) times a week.,  "Disp: 90 tablet, Rfl: 1    cholestyramine (QUESTRAN) 4 gram packet, Take 1 packet (4 g total) by mouth 3 (three) times daily with meals., Disp: 90 packet, Rfl: 1    fluticasone (FLONASE SENSIMIST) 27.5 mcg/actuation nasal spray, 2 sprays by Nasal route once daily., Disp: 9.1 mL, Rfl: 1    potassium chloride SA (K-DUR,KLOR-CON) 20 MEQ tablet, Take 1 tablet (20 mEq total) by mouth 3 (three) times daily., Disp: 9 tablet, Rfl: 0    carvediloL (COREG) 6.25 MG tablet, Take 1 tablet by mouth twice daily, Disp: 180 tablet, Rfl: 0    HYDROcodone-acetaminophen (NORCO) 7.5-325 mg per tablet, Take 1 tablet by mouth every 6 (six) hours as needed for Pain. (Patient not taking: Reported on 11/25/2024), Disp: 15 tablet, Rfl: 0    nitrofurantoin, macrocrystal-monohydrate, (MACROBID) 100 MG capsule, Take 1 capsule (100 mg total) by mouth 2 (two) times daily. (Patient not taking: Reported on 11/25/2024), Disp: 14 capsule, Rfl: 0    ondansetron (ZOFRAN-ODT) 4 MG TbDL, Take 1 tablet (4 mg total) by mouth every 6 (six) hours as needed (nausea). (Patient not taking: Reported on 11/25/2024), Disp: 20 tablet, Rfl: 0    scopolamine (TRANSDERM-SCOP) 1.3-1.5 mg (1 mg over 3 days), Place 1 patch onto the skin every 72 hours. (Patient not taking: Reported on 11/25/2024), Disp: 10 patch, Rfl: 0    Review of Systems   Constitutional:  Negative for chills, diaphoresis, fever and malaise/fatigue.   Respiratory:  Negative for cough and shortness of breath.    Cardiovascular:  Negative for chest pain, palpitations, orthopnea, claudication, leg swelling and PND.   Gastrointestinal:  Negative for abdominal pain, heartburn, nausea and vomiting.   Neurological:  Negative for dizziness.       Objective:   BP (!) 130/90 (BP Location: Right arm, Patient Position: Sitting)   Pulse 89   Ht 4' 11" (1.499 m)   Wt 67.1 kg (148 lb)   SpO2 99%   BMI 29.89 kg/m²     Physical Exam  Constitutional:       General: She is not in acute distress.     Appearance: " Normal appearance.   Cardiovascular:      Rate and Rhythm: Normal rate and regular rhythm.      Pulses: Normal pulses.      Heart sounds: Normal heart sounds. No murmur heard.     No friction rub. No gallop.   Pulmonary:      Effort: Pulmonary effort is normal.      Breath sounds: Normal breath sounds. No wheezing or rales.   Musculoskeletal:      Right lower leg: No edema.      Left lower leg: No edema.   Skin:     General: Skin is warm and dry.   Neurological:      Mental Status: She is alert.           Assessment:     1. Heart failure with improved ejection fraction (HFimpEF)                Plan:   No problem-specific Assessment & Plan notes found for this encounter.       Heart failure with improved ejection fraction (35%->55 to 60%)  LVEF 35% in 2018 due to stress-induced cardiomyopathy  NYHA class I symptoms currently, euvolemic on exam      Follow-up in 1 year

## 2024-11-26 RX ORDER — CARVEDILOL 6.25 MG/1
6.25 TABLET ORAL 2 TIMES DAILY
Qty: 180 TABLET | Refills: 0 | Status: SHIPPED | OUTPATIENT
Start: 2024-11-26

## 2024-12-02 ENCOUNTER — HOSPITAL ENCOUNTER (OUTPATIENT)
Dept: RADIOLOGY | Facility: HOSPITAL | Age: 64
Discharge: HOME | End: 2024-12-02
Attending: INTERNAL MEDICINE
Payer: MEDICARE

## 2024-12-02 VITALS — BODY MASS INDEX: 29.84 KG/M2 | WEIGHT: 148 LBS | HEIGHT: 59 IN

## 2024-12-02 DIAGNOSIS — Z12.31 OTHER SCREENING MAMMOGRAM: ICD-10-CM

## 2024-12-02 PROCEDURE — 77063 BREAST TOMOSYNTHESIS BI: CPT | Mod: 26,,, | Performed by: RADIOLOGY

## 2024-12-02 PROCEDURE — 77063 BREAST TOMOSYNTHESIS BI: CPT | Mod: TC

## 2024-12-02 PROCEDURE — 77067 SCR MAMMO BI INCL CAD: CPT | Mod: 26,,, | Performed by: RADIOLOGY

## 2025-01-28 ENCOUNTER — OFFICE VISIT (OUTPATIENT)
Dept: FAMILY MEDICINE | Facility: CLINIC | Age: 65
End: 2025-01-28
Payer: MEDICARE

## 2025-01-28 VITALS
OXYGEN SATURATION: 97 % | SYSTOLIC BLOOD PRESSURE: 141 MMHG | HEART RATE: 92 BPM | WEIGHT: 151 LBS | HEIGHT: 59 IN | RESPIRATION RATE: 18 BRPM | BODY MASS INDEX: 30.44 KG/M2 | DIASTOLIC BLOOD PRESSURE: 92 MMHG | TEMPERATURE: 98 F

## 2025-01-28 DIAGNOSIS — E87.6 HYPOKALEMIA: ICD-10-CM

## 2025-01-28 DIAGNOSIS — G47.00 INSOMNIA, UNSPECIFIED TYPE: Primary | ICD-10-CM

## 2025-01-28 LAB
ANION GAP SERPL CALCULATED.3IONS-SCNC: 14 MMOL/L (ref 7–16)
BUN SERPL-MCNC: 15 MG/DL (ref 10–20)
BUN/CREAT SERPL: 16 (ref 6–20)
CALCIUM SERPL-MCNC: 9.7 MG/DL (ref 8.4–10.2)
CHLORIDE SERPL-SCNC: 110 MMOL/L (ref 98–107)
CO2 SERPL-SCNC: 23 MMOL/L (ref 23–31)
CREAT SERPL-MCNC: 0.91 MG/DL (ref 0.55–1.02)
EGFR (NO RACE VARIABLE) (RUSH/TITUS): 70 ML/MIN/1.73M2
GLUCOSE SERPL-MCNC: 89 MG/DL (ref 82–115)
POTASSIUM SERPL-SCNC: 3.5 MMOL/L (ref 3.5–5.1)
SODIUM SERPL-SCNC: 143 MMOL/L (ref 136–145)

## 2025-01-28 PROCEDURE — 80048 BASIC METABOLIC PNL TOTAL CA: CPT | Mod: ,,, | Performed by: CLINICAL MEDICAL LABORATORY

## 2025-01-28 PROCEDURE — 3008F BODY MASS INDEX DOCD: CPT | Mod: ,,, | Performed by: INTERNAL MEDICINE

## 2025-01-28 PROCEDURE — 3075F SYST BP GE 130 - 139MM HG: CPT | Mod: ,,, | Performed by: INTERNAL MEDICINE

## 2025-01-28 PROCEDURE — 1125F AMNT PAIN NOTED PAIN PRSNT: CPT | Mod: ,,, | Performed by: INTERNAL MEDICINE

## 2025-01-28 PROCEDURE — 1159F MED LIST DOCD IN RCRD: CPT | Mod: ,,, | Performed by: INTERNAL MEDICINE

## 2025-01-28 PROCEDURE — 3080F DIAST BP >= 90 MM HG: CPT | Mod: ,,, | Performed by: INTERNAL MEDICINE

## 2025-01-28 PROCEDURE — 99214 OFFICE O/P EST MOD 30 MIN: CPT | Mod: ,,, | Performed by: INTERNAL MEDICINE

## 2025-01-28 RX ORDER — CARVEDILOL 6.25 MG/1
6.25 TABLET ORAL 2 TIMES DAILY
Qty: 180 TABLET | Refills: 0 | Status: SHIPPED | OUTPATIENT
Start: 2025-01-28

## 2025-01-28 RX ORDER — ZOLPIDEM TARTRATE 5 MG/1
5 TABLET ORAL NIGHTLY PRN
Qty: 30 TABLET | Refills: 1 | Status: SHIPPED | OUTPATIENT
Start: 2025-01-28

## 2025-01-28 RX ORDER — POTASSIUM CHLORIDE 20 MEQ/1
20 TABLET, EXTENDED RELEASE ORAL 3 TIMES DAILY
Qty: 9 TABLET | Refills: 0 | Status: SHIPPED | OUTPATIENT
Start: 2025-01-28

## 2025-01-28 NOTE — PROGRESS NOTES
Subjective:       Patient ID: Faviola King is a 64 y.o. female.    Chief Complaint: Medication Refill and Insomnia    History of Present Illness    CHIEF COMPLAINT:  Patient presents today for difficulty sleeping.    SLEEP:  She reports difficulty sleeping which she attributes to recently reducing her metassum medication. She does not currently take any sleeping medication.    MEDICAL HISTORY:  She has a history of hypokalemia and is currently out of her potassium medication. Her last potassium level was 2.9. She also has a history of a lung nodule and is status post cholecystectomy.    RECENT LABS AND IMAGING:  Recent cholesterol, kidney function, and liver function tests were normal without signs of anemia. Last mammogram was normal.    SOCIAL HISTORY:  She is a former smoker who recently quit cigarettes.         Current Medications:    Current Outpatient Medications:     albuterol (VENTOLIN HFA) 90 mcg/actuation inhaler, Inhale 2 puffs into the lungs 2 (two) times a day. Rescue, Disp: 18 g, Rfl: 6    amLODIPine (NORVASC) 10 MG tablet, Take 1 tablet (10 mg total) by mouth once daily., Disp: 90 tablet, Rfl: 1    atorvastatin (LIPITOR) 40 MG tablet, Take 1 tablet (40 mg total) by mouth once daily., Disp: 90 tablet, Rfl: 1    cetirizine (ZYRTEC) 10 MG tablet, Take 1 tablet (10 mg total) by mouth every evening., Disp: 30 tablet, Rfl: 1    cholecalciferol, vitamin D3, (VITAMIN D3) 125 mcg (5,000 unit) Tab, Take 1 tablet (5,000 Units total) by mouth 3 (three) times a week., Disp: 90 tablet, Rfl: 1    cholestyramine (QUESTRAN) 4 gram packet, Take 1 packet (4 g total) by mouth 3 (three) times daily with meals., Disp: 90 packet, Rfl: 1    fluticasone (FLONASE SENSIMIST) 27.5 mcg/actuation nasal spray, 2 sprays by Nasal route once daily., Disp: 9.1 mL, Rfl: 1    carvediloL (COREG) 6.25 MG tablet, Take 1 tablet (6.25 mg total) by mouth 2 (two) times daily., Disp: 180 tablet, Rfl: 0    HYDROcodone-acetaminophen (NORCO) 7.5-325  "mg per tablet, Take 1 tablet by mouth every 6 (six) hours as needed for Pain. (Patient not taking: Reported on 10/14/2024), Disp: 15 tablet, Rfl: 0    nitrofurantoin, macrocrystal-monohydrate, (MACROBID) 100 MG capsule, Take 1 capsule (100 mg total) by mouth 2 (two) times daily. (Patient not taking: Reported on 10/14/2024), Disp: 14 capsule, Rfl: 0    ondansetron (ZOFRAN-ODT) 4 MG TbDL, Take 1 tablet (4 mg total) by mouth every 6 (six) hours as needed (nausea). (Patient not taking: Reported on 10/14/2024), Disp: 20 tablet, Rfl: 0    potassium chloride SA (K-DUR,KLOR-CON) 20 MEQ tablet, Take 1 tablet (20 mEq total) by mouth 3 (three) times daily., Disp: 9 tablet, Rfl: 0    scopolamine (TRANSDERM-SCOP) 1.3-1.5 mg (1 mg over 3 days), Place 1 patch onto the skin every 72 hours. (Patient not taking: Reported on 10/14/2024), Disp: 10 patch, Rfl: 0    zolpidem (AMBIEN) 5 MG Tab, Take 1 tablet (5 mg total) by mouth nightly as needed (for insomnia)., Disp: 30 tablet, Rfl: 1           ROS  Twelve point system reviewed, unremarkable except for stated above in HPI.        Objective:         Vitals:    01/28/25 1343 01/28/25 1439   BP: (!) 132/93 (!) 141/92   BP Location: Left arm    Patient Position: Sitting    Pulse: 92    Resp: 18    Temp: 97.7 °F (36.5 °C)    TempSrc: Temporal    SpO2: 97%    Weight: 68.5 kg (151 lb)    Height: 4' 11" (1.499 m)         Physical Exam     Patient is awake alert oriented person place and  Lungs are clear to auscultation bilaterally no crackles or wheezes   Cardiovascular S1-S2 regular rate and rhythm no murmurs rubs or gallops   Abdomen is soft positive bowel sounds nontender, extremities no clubbing cyanosis edema  Neuro no focal neurological deficits  Skin warm and dry.     Last Labs:     No visits with results within 1 Month(s) from this visit.   Latest known visit with results is:   Lab Visit on 10/17/2024   Component Date Value    Sodium 10/17/2024 142     Potassium 10/17/2024 2.9 (L)     " Chloride 10/17/2024 107     CO2 10/17/2024 29     Anion Gap 10/17/2024 9     Glucose 10/17/2024 95     BUN 10/17/2024 14     Creatinine 10/17/2024 0.76     BUN/Creatinine Ratio 10/17/2024 18     Calcium 10/17/2024 10.1     eGFR 10/17/2024 87     Triglycerides 10/17/2024 201 (H)     Cholesterol 10/17/2024 189     HDL Cholesterol 10/17/2024 46     Cholesterol/HDL Ratio (R* 10/17/2024 4.1     Non-HDL 10/17/2024 143     LDL Calculated 10/17/2024 103     LDL/HDL 10/17/2024 2.2     VLDL 10/17/2024 40        Last Imaging:  Mammo Digital Screening Bilat w/ Ravinder  Narrative: Facility:  [address] [id number] [phone]    Name: Faviola King    MRN: [id number]    Result:  Mammo Digital Screening Bilat w/ Ravinder    History:  Patient is 64 y.o. and is seen for a screening mammogram.    Films Compared:  Compared to: 11/27/2023 Mammo Digital Screening Bilat w/ Ravinder     Findings:  This procedure was performed using tomosynthesis.   Computer-aided detection was utilized in the interpretation of this   examination.    There are scattered areas of fibroglandular density. There is no evidence   of suspicious masses, microcalcifications or architectural distortion.  Impression:    No mammographic evidence of malignancy.    BI-RADS Category 1: Negative    Recommendation:  Routine screening mammogram in 1 year is recommended.    Your estimated lifetime risk of breast cancer (to age 85) based on   Tyrer-Cuzick risk assessment model is 2.49%.  According to the American   Cancer Society, patients with a lifetime breast cancer risk of 20% or   higher might benefit from supplemental screening tests, such as screening   breast MRI.    Ervin Corrigan MD         **Labs and x-rays personally reviewed by me    ** reviewed           Assessment & Plan:   Assessment & Plan    IMPRESSION:  - Evaluated reported sleep issues and history of hypokalemia  - Considered restarting potassium supplementation  based on previous low levels  - Reviewed recent lab results, noting normal cholesterol, kidney, and liver function  - Assessed lung health, considering previous spot noted during surgery  - Confirmed successful smoking cessation    MALIGNANT NEOPLASM OF LUNG, UNSPECIFIED LATERALITY, UNSPECIFIED PART OF LUNG:  - Noted patient's history of a spot on the lungs.  - Will double-check the patient's lung condition.    DM (DIABETES MELLITUS):  - Reviewed recent lab results.  - Noted normal cholesterol levels, kidney function, and liver function.    HEART FAILURE WITH IMPROVED EJECTION FRACTION (HFIMPEF):  - Performed a brief physical exam, including auscultation of the patient's lungs.    HYPOKALEMIA:  - Ordered potassium level check today due to patient's history of hypokalemia, current symptoms, and previous level of 2.9.  - Refilled potassium supplement and instructed patient to continue monitoring for symptoms.    INSOMNIA:  - Prescribed Ambien 5 mg, 1 tablet orally at bedtime as needed for sleep.  - Advised patient to report any side effects or changes in sleep patterns.    HYPERLIPIDEMIA:  - Continued current cholesterol medication regimen.  - Advised patient to maintain a healthy diet and exercise routine.    HISTORY OF CHOLECYSTECTOMY:  - Advised patient to report any digestive issues or changes in bowel habits.    SMOKING CESSATION:  - Congratulated patient on successfully quitting smoking and encouraged continued abstinence from tobacco products.    FOLLOW UP:  - Scheduled for May.           1. Insomnia,   -     zolpidem (AMBIEN) 5 MG Tab; Take 1 tablet (5 mg total) by mouth nightly as needed (for insomnia).  Dispense: 30 tablet; Refill: 1    2. Hypokalemia  -     Basic Metabolic Panel; Future; Expected date: 01/28/2025    Chronic hypertension, and hypokalemia as mentioned above  -     carvediloL (COREG) 6.25 MG tablet; Take 1 tablet (6.25 mg total) by mouth 2 (two) times daily.  Dispense: 180 tablet; Refill: 0  -      potassium chloride SA (K-DUR,KLOR-CON) 20 MEQ tablet; Take 1 tablet (20 mEq total) by mouth 3 (three) times daily.  Dispense: 9 tablet; Refill: 0            Shantanu Larson MD  This note was generated with the assistance of ambient listening technology. Verbal consent was obtained by the patient and accompanying visitor(s) for the recording of patient appointment to facilitate this note. I attest to having reviewed and edited the generated note for accuracy, though some syntax or spelling errors may persist. Please contact the author of this note for any clarification.

## 2025-03-12 RX ORDER — AMLODIPINE BESYLATE 10 MG/1
10 TABLET ORAL
Qty: 90 TABLET | Refills: 0 | Status: SHIPPED | OUTPATIENT
Start: 2025-03-12

## 2025-03-21 ENCOUNTER — OFFICE VISIT (OUTPATIENT)
Dept: FAMILY MEDICINE | Facility: CLINIC | Age: 65
End: 2025-03-21
Payer: MEDICARE

## 2025-03-21 VITALS
OXYGEN SATURATION: 99 % | RESPIRATION RATE: 20 BRPM | BODY MASS INDEX: 30.04 KG/M2 | TEMPERATURE: 98 F | SYSTOLIC BLOOD PRESSURE: 138 MMHG | HEIGHT: 59 IN | HEART RATE: 82 BPM | DIASTOLIC BLOOD PRESSURE: 87 MMHG | WEIGHT: 149 LBS

## 2025-03-21 DIAGNOSIS — R10.9 STOMACH PAIN: ICD-10-CM

## 2025-03-21 DIAGNOSIS — R11.10 VOMITING, UNSPECIFIED VOMITING TYPE, UNSPECIFIED WHETHER NAUSEA PRESENT: ICD-10-CM

## 2025-03-21 DIAGNOSIS — B34.9 VIRAL ILLNESS: Primary | ICD-10-CM

## 2025-03-21 LAB
BILIRUB SERPL-MCNC: NORMAL MG/DL
BLOOD, POC UA: NORMAL
CTP QC/QA: YES
CTP QC/QA: YES
GLUCOSE UR QL STRIP: NORMAL
KETONES UR QL STRIP: NORMAL
LEUKOCYTE ESTERASE URINE, POC: NORMAL
NITRITE, POC UA: NORMAL
PH, POC UA: 8.5
POC MOLECULAR INFLUENZA A AGN: NEGATIVE
POC MOLECULAR INFLUENZA B AGN: NEGATIVE
PROTEIN, POC: 30
SARS-COV-2 RDRP RESP QL NAA+PROBE: NEGATIVE
SPECIFIC GRAVITY, POC UA: 1.02
UROBILINOGEN, POC UA: 0.2

## 2025-03-21 RX ORDER — ONDANSETRON 4 MG/1
4 TABLET, ORALLY DISINTEGRATING ORAL EVERY 6 HOURS PRN
Qty: 20 TABLET | Refills: 0 | Status: SHIPPED | OUTPATIENT
Start: 2025-03-21

## 2025-03-21 RX ORDER — ONDANSETRON 2 MG/ML
4 INJECTION INTRAMUSCULAR; INTRAVENOUS
Status: DISCONTINUED | OUTPATIENT
Start: 2025-03-21 | End: 2025-03-21

## 2025-03-21 RX ORDER — ONDANSETRON 2 MG/ML
4 INJECTION INTRAMUSCULAR; INTRAVENOUS
Status: COMPLETED | OUTPATIENT
Start: 2025-03-21 | End: 2025-03-21

## 2025-03-21 RX ADMIN — ONDANSETRON 4 MG: 2 INJECTION INTRAMUSCULAR; INTRAVENOUS at 09:03

## 2025-03-21 NOTE — PROGRESS NOTES
Subjective:       Patient ID: Faviola King is a 64 y.o. female.    Chief Complaint: Emesis (Stomach pain; started this morning) and Chills (Pts been having chills but family noticed lots of sweating)    Nausea, vomiting and chills- symptoms started yesterday afternoon    Emesis   Associated symptoms include chills. Pertinent negatives include no abdominal pain, chest pain, coughing, dizziness, fever or headaches.     Review of Systems   Constitutional:  Positive for chills. Negative for appetite change, fatigue and fever.   HENT:  Negative for nasal congestion, ear pain and sore throat.    Eyes:  Negative for pain, discharge and itching.   Respiratory:  Negative for cough and shortness of breath.    Cardiovascular:  Negative for chest pain and leg swelling.   Gastrointestinal:  Positive for nausea and vomiting. Negative for abdominal pain and change in bowel habit.   Musculoskeletal:  Negative for back pain, gait problem and neck pain.   Integumentary:  Negative for rash and wound.   Allergic/Immunologic: Negative for immunocompromised state.   Neurological:  Negative for dizziness, weakness and headaches.   All other systems reviewed and are negative.        Objective:      Physical Exam  Vitals and nursing note reviewed.   Constitutional:       General: She is not in acute distress.     Appearance: Normal appearance. She is ill-appearing. She is not toxic-appearing or diaphoretic.   HENT:      Head: Normocephalic.      Right Ear: Tympanic membrane, ear canal and external ear normal.      Left Ear: Tympanic membrane, ear canal and external ear normal.      Nose: Nose normal. No congestion or rhinorrhea.      Mouth/Throat:      Mouth: Mucous membranes are moist.      Pharynx: No oropharyngeal exudate or posterior oropharyngeal erythema.   Eyes:      General: No scleral icterus.        Right eye: No discharge.         Left eye: No discharge.      Extraocular Movements: Extraocular movements intact.       Conjunctiva/sclera: Conjunctivae normal.      Pupils: Pupils are equal, round, and reactive to light.   Cardiovascular:      Rate and Rhythm: Normal rate and regular rhythm.      Pulses: Normal pulses.      Heart sounds: Normal heart sounds. No murmur heard.  Pulmonary:      Effort: Pulmonary effort is normal. No respiratory distress.      Breath sounds: Normal breath sounds. No wheezing, rhonchi or rales.   Abdominal:      General: Bowel sounds are normal.      Palpations: Abdomen is soft.      Tenderness: There is generalized abdominal tenderness. There is no right CVA tenderness, left CVA tenderness, guarding or rebound.   Musculoskeletal:         General: Normal range of motion.      Cervical back: Neck supple. No tenderness.   Lymphadenopathy:      Cervical: No cervical adenopathy.   Skin:     General: Skin is warm and dry.      Capillary Refill: Capillary refill takes less than 2 seconds.      Findings: No rash.   Neurological:      Mental Status: She is alert and oriented to person, place, and time.   Psychiatric:         Mood and Affect: Mood normal.         Behavior: Behavior normal.         Thought Content: Thought content normal.         Judgment: Judgment normal.          Assessment:       1. Vomiting, unspecified vomiting type, unspecified whether nausea present    2. Stomach pain        Plan:   Vomiting, unspecified vomiting type, unspecified whether nausea present  -     POCT Influenza A/B Molecular  -     POCT COVID-19 Rapid Screening  -     Discontinue: ondansetron injection 4 mg  -     ondansetron injection 4 mg    Stomach pain  -     POCT Urinalysis           Risks, benefits, and side effects were discussed with the patient. All questions were answered to the fullest satisfaction of the patient, and pt verbalized understanding and agreement to treatment plan. Pt was to call with any new or worsening symptoms, or present to the ER

## 2025-03-23 ENCOUNTER — HOSPITAL ENCOUNTER (EMERGENCY)
Facility: HOSPITAL | Age: 65
Discharge: HOME | End: 2025-03-23
Payer: MEDICARE

## 2025-03-23 VITALS
WEIGHT: 141 LBS | DIASTOLIC BLOOD PRESSURE: 69 MMHG | HEART RATE: 95 BPM | BODY MASS INDEX: 28.43 KG/M2 | SYSTOLIC BLOOD PRESSURE: 102 MMHG | RESPIRATION RATE: 16 BRPM | HEIGHT: 59 IN | TEMPERATURE: 99 F | OXYGEN SATURATION: 99 %

## 2025-03-23 DIAGNOSIS — E87.6 HYPOKALEMIA: ICD-10-CM

## 2025-03-23 DIAGNOSIS — R10.9 ABDOMINAL PAIN: Primary | ICD-10-CM

## 2025-03-23 DIAGNOSIS — R11.2 NAUSEA AND VOMITING, UNSPECIFIED VOMITING TYPE: ICD-10-CM

## 2025-03-23 DIAGNOSIS — R10.9 ABDOMINAL PAIN, UNSPECIFIED ABDOMINAL LOCATION: ICD-10-CM

## 2025-03-23 LAB
ALBUMIN SERPL BCP-MCNC: 4.3 G/DL (ref 3.4–4.8)
ALBUMIN/GLOB SERPL: 1 {RATIO}
ALP SERPL-CCNC: 85 U/L (ref 40–150)
ALT SERPL W P-5'-P-CCNC: 10 U/L
ANION GAP SERPL CALCULATED.3IONS-SCNC: 18 MMOL/L (ref 7–16)
AST SERPL W P-5'-P-CCNC: 26 U/L (ref 11–45)
BACTERIA #/AREA URNS HPF: ABNORMAL /HPF
BASOPHILS # BLD AUTO: 0.03 K/UL (ref 0–0.2)
BASOPHILS NFR BLD AUTO: 0.3 % (ref 0–1)
BILIRUB SERPL-MCNC: 0.4 MG/DL
BILIRUB UR QL STRIP: NEGATIVE
BUN SERPL-MCNC: 24 MG/DL (ref 10–20)
BUN/CREAT SERPL: 21 (ref 6–20)
CALCIUM SERPL-MCNC: 10 MG/DL (ref 8.4–10.2)
CHLORIDE SERPL-SCNC: 103 MMOL/L (ref 98–107)
CLARITY UR: CLEAR
CO2 SERPL-SCNC: 23 MMOL/L (ref 23–31)
COLOR UR: ABNORMAL
CREAT SERPL-MCNC: 1.12 MG/DL (ref 0.55–1.02)
DIFFERENTIAL METHOD BLD: ABNORMAL
EGFR (NO RACE VARIABLE) (RUSH/TITUS): 54 ML/MIN/1.73M2
EOSINOPHIL # BLD AUTO: 0.01 K/UL (ref 0–0.5)
EOSINOPHIL NFR BLD AUTO: 0.1 % (ref 1–4)
ERYTHROCYTE [DISTWIDTH] IN BLOOD BY AUTOMATED COUNT: 14.3 % (ref 11.5–14.5)
GLOBULIN SER-MCNC: 4.1 G/DL (ref 2–4)
GLUCOSE SERPL-MCNC: 133 MG/DL (ref 82–115)
GLUCOSE UR STRIP-MCNC: NORMAL MG/DL
HCT VFR BLD AUTO: 45 % (ref 38–47)
HGB BLD-MCNC: 14.9 G/DL (ref 12–16)
IMM GRANULOCYTES # BLD AUTO: 0.04 K/UL (ref 0–0.04)
IMM GRANULOCYTES NFR BLD: 0.4 % (ref 0–0.4)
KETONES UR STRIP-SCNC: ABNORMAL MG/DL
LEUKOCYTE ESTERASE UR QL STRIP: ABNORMAL
LIPASE SERPL-CCNC: 19 U/L
LYMPHOCYTES # BLD AUTO: 1.59 K/UL (ref 1–4.8)
LYMPHOCYTES NFR BLD AUTO: 14.1 % (ref 27–41)
MAGNESIUM SERPL-MCNC: 1.9 MG/DL (ref 1.6–2.6)
MCH RBC QN AUTO: 28.3 PG (ref 27–31)
MCHC RBC AUTO-ENTMCNC: 33.1 G/DL (ref 32–36)
MCV RBC AUTO: 85.6 FL (ref 80–96)
MONOCYTES # BLD AUTO: 0.5 K/UL (ref 0–0.8)
MONOCYTES NFR BLD AUTO: 4.4 % (ref 2–6)
MPC BLD CALC-MCNC: 9.5 FL (ref 9.4–12.4)
MUCOUS, UA: ABNORMAL /LPF
NEUTROPHILS # BLD AUTO: 9.09 K/UL (ref 1.8–7.7)
NEUTROPHILS NFR BLD AUTO: 80.7 % (ref 53–65)
NITRITE UR QL STRIP: NEGATIVE
NRBC # BLD AUTO: 0 X10E3/UL
NRBC, AUTO (.00): 0 %
PH UR STRIP: 5 PH UNITS
PLATELET # BLD AUTO: 364 K/UL (ref 150–400)
POTASSIUM SERPL-SCNC: 2.7 MMOL/L (ref 3.5–5.1)
PROT SERPL-MCNC: 8.4 G/DL (ref 5.8–7.6)
PROT UR QL STRIP: 20
RBC # BLD AUTO: 5.26 M/UL (ref 4.2–5.4)
RBC # UR STRIP: ABNORMAL /UL
RBC #/AREA URNS HPF: 3 /HPF
SODIUM SERPL-SCNC: 141 MMOL/L (ref 136–145)
SP GR UR STRIP: 1.01
SQUAMOUS #/AREA URNS LPF: ABNORMAL /HPF
UROBILINOGEN UR STRIP-ACNC: NORMAL MG/DL
WBC # BLD AUTO: 11.26 K/UL (ref 4.5–11)
WBC #/AREA URNS HPF: 11 /HPF

## 2025-03-23 PROCEDURE — 81003 URINALYSIS AUTO W/O SCOPE: CPT | Performed by: NURSE PRACTITIONER

## 2025-03-23 PROCEDURE — 96365 THER/PROPH/DIAG IV INF INIT: CPT

## 2025-03-23 PROCEDURE — 83735 ASSAY OF MAGNESIUM: CPT | Performed by: NURSE PRACTITIONER

## 2025-03-23 PROCEDURE — 96366 THER/PROPH/DIAG IV INF ADDON: CPT

## 2025-03-23 PROCEDURE — 93010 ELECTROCARDIOGRAM REPORT: CPT | Mod: ,,, | Performed by: INTERNAL MEDICINE

## 2025-03-23 PROCEDURE — 25500020 PHARM REV CODE 255: Performed by: NURSE PRACTITIONER

## 2025-03-23 PROCEDURE — 83690 ASSAY OF LIPASE: CPT | Performed by: NURSE PRACTITIONER

## 2025-03-23 PROCEDURE — 96374 THER/PROPH/DIAG INJ IV PUSH: CPT

## 2025-03-23 PROCEDURE — 36415 COLL VENOUS BLD VENIPUNCTURE: CPT | Performed by: NURSE PRACTITIONER

## 2025-03-23 PROCEDURE — 63600175 PHARM REV CODE 636 W HCPCS: Performed by: NURSE PRACTITIONER

## 2025-03-23 PROCEDURE — 96361 HYDRATE IV INFUSION ADD-ON: CPT

## 2025-03-23 PROCEDURE — 87077 CULTURE AEROBIC IDENTIFY: CPT | Performed by: NURSE PRACTITIONER

## 2025-03-23 PROCEDURE — 80053 COMPREHEN METABOLIC PANEL: CPT | Performed by: NURSE PRACTITIONER

## 2025-03-23 PROCEDURE — 99285 EMERGENCY DEPT VISIT HI MDM: CPT | Mod: 25

## 2025-03-23 PROCEDURE — 96375 TX/PRO/DX INJ NEW DRUG ADDON: CPT

## 2025-03-23 PROCEDURE — 93005 ELECTROCARDIOGRAM TRACING: CPT

## 2025-03-23 PROCEDURE — 96376 TX/PRO/DX INJ SAME DRUG ADON: CPT

## 2025-03-23 PROCEDURE — 85025 COMPLETE CBC W/AUTO DIFF WBC: CPT | Performed by: NURSE PRACTITIONER

## 2025-03-23 PROCEDURE — 25000003 PHARM REV CODE 250: Performed by: NURSE PRACTITIONER

## 2025-03-23 RX ORDER — POTASSIUM CHLORIDE 20 MEQ/1
40 TABLET, EXTENDED RELEASE ORAL
Status: COMPLETED | OUTPATIENT
Start: 2025-03-23 | End: 2025-03-23

## 2025-03-23 RX ORDER — MORPHINE SULFATE 4 MG/ML
4 INJECTION, SOLUTION INTRAMUSCULAR; INTRAVENOUS
Refills: 0 | Status: COMPLETED | OUTPATIENT
Start: 2025-03-23 | End: 2025-03-23

## 2025-03-23 RX ORDER — POTASSIUM CHLORIDE 750 MG/1
20 TABLET, EXTENDED RELEASE ORAL DAILY
Qty: 60 TABLET | Refills: 0 | Status: SHIPPED | OUTPATIENT
Start: 2025-03-23

## 2025-03-23 RX ORDER — POTASSIUM CHLORIDE 7.45 MG/ML
20 INJECTION INTRAVENOUS
Status: COMPLETED | OUTPATIENT
Start: 2025-03-23 | End: 2025-03-23

## 2025-03-23 RX ORDER — HYDROCODONE BITARTRATE AND ACETAMINOPHEN 7.5; 325 MG/1; MG/1
1 TABLET ORAL EVERY 6 HOURS PRN
Qty: 12 TABLET | Refills: 0 | Status: SHIPPED | OUTPATIENT
Start: 2025-03-23 | End: 2025-03-23

## 2025-03-23 RX ORDER — ONDANSETRON HYDROCHLORIDE 2 MG/ML
4 INJECTION, SOLUTION INTRAVENOUS
Status: COMPLETED | OUTPATIENT
Start: 2025-03-23 | End: 2025-03-23

## 2025-03-23 RX ORDER — IOPAMIDOL 755 MG/ML
100 INJECTION, SOLUTION INTRAVASCULAR
Status: COMPLETED | OUTPATIENT
Start: 2025-03-23 | End: 2025-03-23

## 2025-03-23 RX ORDER — ONDANSETRON 4 MG/1
4 TABLET, ORALLY DISINTEGRATING ORAL EVERY 8 HOURS PRN
Qty: 20 TABLET | Refills: 0 | Status: SHIPPED | OUTPATIENT
Start: 2025-03-23

## 2025-03-23 RX ORDER — SODIUM CHLORIDE 9 MG/ML
1000 INJECTION, SOLUTION INTRAVENOUS
Status: COMPLETED | OUTPATIENT
Start: 2025-03-23 | End: 2025-03-23

## 2025-03-23 RX ORDER — HYDROCODONE BITARTRATE AND ACETAMINOPHEN 7.5; 325 MG/1; MG/1
1 TABLET ORAL EVERY 6 HOURS PRN
Qty: 12 TABLET | Refills: 0 | Status: SHIPPED | OUTPATIENT
Start: 2025-03-23

## 2025-03-23 RX ADMIN — POTASSIUM CHLORIDE 20 MEQ: 7.46 INJECTION, SOLUTION INTRAVENOUS at 11:03

## 2025-03-23 RX ADMIN — MORPHINE SULFATE 4 MG: 4 INJECTION INTRAVENOUS at 02:03

## 2025-03-23 RX ADMIN — POTASSIUM CHLORIDE 40 MEQ: 1500 TABLET, EXTENDED RELEASE ORAL at 11:03

## 2025-03-23 RX ADMIN — IOPAMIDOL 100 ML: 755 INJECTION, SOLUTION INTRAVENOUS at 10:03

## 2025-03-23 RX ADMIN — MORPHINE SULFATE 4 MG: 4 INJECTION INTRAVENOUS at 10:03

## 2025-03-23 RX ADMIN — ONDANSETRON 4 MG: 2 INJECTION INTRAMUSCULAR; INTRAVENOUS at 09:03

## 2025-03-23 RX ADMIN — SODIUM CHLORIDE 1000 ML: 9 INJECTION, SOLUTION INTRAVENOUS at 10:03

## 2025-03-23 NOTE — ED PROVIDER NOTES
Encounter Date: 3/23/2025       History     Chief Complaint   Patient presents with    Abdominal Pain    Vomiting    Nausea     Patient presents to the ED with c/o nausea, vomiting, and LUQ abdominal pain that started 2 days ago.     63 y/o female presents with ABD pain x2 days, low grade fever, nausea and vomiting.  Primarily c/o left ABD pain.  Family reports noted possible swelling on that side. Last intake yesterday, bland diet.  No sick contacts that they are aware of.      Review of patient's allergies indicates:  No Known Allergies  Past Medical History:   Diagnosis Date    GERD (gastroesophageal reflux disease)     Heart attack     Hypertension     Nodule of upper lobe of right lung     1.5 x 1.2 cm nodule RUL  23     Past Surgical History:   Procedure Laterality Date     SECTION      HYSTERECTOMY      LAPAROSCOPIC CHOLECYSTECTOMY N/A 2024    Procedure: CHOLECYSTECTOMY, LAPAROSCOPIC;  Surgeon: Rickey Santizo MD;  Location: [facility];  Service: General;  Laterality: N/A;    LUNG SURGERY Right 2023     Family History   Problem Relation Name Age of Onset    Heart failure Mother      Hypertension Mother      Hypertension Father      Hypertension Daughter      Seizures Daughter      Kidney failure Son      Diabetes Neg Hx      Cancer Neg Hx       Social History[1]  Review of Systems   Constitutional:  Negative for fever.   HENT:  Negative for sore throat.    Respiratory:  Negative for shortness of breath.    Cardiovascular:  Negative for chest pain.   Gastrointestinal:  Positive for abdominal pain, diarrhea, nausea and vomiting.   Genitourinary:  Negative for dysuria.   Musculoskeletal:  Negative for back pain.   Skin:  Negative for rash.   Neurological:  Negative for weakness.   Hematological:  Does not bruise/bleed easily.   All other systems reviewed and are negative.      Physical Exam     Initial Vitals [25 0825]   BP Pulse Resp Temp SpO2   120/85 106 19 97.6 °F (36.4  °C) 100 %      MAP       --         Physical Exam    Nursing note and vitals reviewed.  Constitutional: She appears well-developed and well-nourished.   HENT:   Head: Normocephalic.   Eyes: EOM are normal. Pupils are equal, round, and reactive to light.   Neck: Neck supple.   Cardiovascular:  Normal rate, regular rhythm, normal heart sounds and intact distal pulses.           Pulmonary/Chest: Breath sounds normal. She has no wheezes.   Abdominal: Abdomen is soft. She exhibits no distension and no mass. Bowel sounds are increased. There is abdominal tenderness (generalized) in the left upper quadrant and left lower quadrant. There is guarding. There is no rebound.   Musculoskeletal:         General: No tenderness. Normal range of motion.      Cervical back: Neck supple.     Neurological: She is alert and oriented to person, place, and time. She has normal strength and normal reflexes. GCS score is 15. GCS eye subscore is 4. GCS verbal subscore is 5. GCS motor subscore is 6.   Skin: Skin is warm and dry.   Psychiatric: She has a normal mood and affect.         Medical Screening Exam   See Full Note    ED Course   Procedures  Labs Reviewed   COMPREHENSIVE METABOLIC PANEL - Abnormal       Result Value    Sodium 141      Potassium 2.7 (*)     Chloride 103      CO2 23      Anion Gap 18 (*)     Glucose 133 (*)     BUN 24 (*)     Creatinine 1.12 (*)     BUN/Creatinine Ratio 21 (*)     Calcium 10.0      Total Protein 8.4 (*)     Albumin 4.3      Globulin 4.1 (*)     A/G Ratio 1.0      Bilirubin, Total 0.4      Alk Phos 85      ALT 10      AST 26      eGFR 54 (*)    URINALYSIS, REFLEX TO URINE CULTURE - Abnormal    Color, UA Light Yellow      Clarity, UA Clear      pH, UA 5.0      Leukocytes, UA Small (*)     Nitrites, UA Negative      Protein, UA 20 (*)     Glucose, UA Normal      Ketones, UA Trace      Urobilinogen, UA Normal      Bilirubin, UA Negative      Blood, UA Small (*)     Specific [place], UA 1.014     CBC WITH  DIFFERENTIAL - Abnormal    WBC 11.26 (*)     RBC 5.26      Hemoglobin 14.9      Hematocrit 45.0      MCV 85.6      MCH 28.3      MCHC 33.1      RDW 14.3      Platelet Count 364      MPV 9.5      Neutrophils % 80.7 (*)     Lymphocytes % 14.1 (*)     Monocytes % 4.4      Eosinophils % 0.1 (*)     Basophils % 0.3      Immature Granulocytes % 0.4      nRBC, Auto 0.0      Neutrophils, Abs 9.09 (*)     Lymphocytes, Absolute 1.59      Monocytes, Absolute 0.50      Eosinophils, Absolute 0.01      Basophils, Absolute 0.03      Immature Granulocytes, Absolute 0.04      nRBC, Absolute 0.00      Diff Type Auto     URINALYSIS, MICROSCOPIC - Abnormal    WBC, UA 11 (*)     RBC, UA 3      Bacteria, UA Moderate (*)     Squamous Epithelial Cells, UA Occasional (*)     Mucous Occasional (*)    LIPASE - Normal    Lipase 19     MAGNESIUM - Normal    Magnesium 1.9     CULTURE, URINE   CBC W/ AUTO DIFFERENTIAL    Narrative:     The following orders were created for panel order CBC auto differential.  Procedure                               Abnormality         Status                     ---------                               -----------         ------                     CBC with Differential[[id number]]       Abnormal            Final result                 Please view results for these tests on the individual orders.          Imaging Results              CT Abdomen Pelvis With IV Contrast NO Oral Contrast (Final result)  Result time 03/23/25 13:01:00      Final result by Karson Morejon MD (03/23/25 13:01:00)                   Impression:      1. No acute process or CT findings to explain patient's symptoms of nonlocalized abdominal pain.  2. Interval cholecystectomy.  Slight prominence of the bile ducts, presumably secondary to post cholecystectomy reservoir effect.  Clinical correlation and with LFTs advised.  3. Borderline hepatomegaly.  4. Colonic diverticulosis.  5. Atherosclerosis.  6. Other incidental/nonemergent findings in the  body of the report.      Electronically signed by: Karson Morejon MD  Date:    03/23/2025  Time:    13:01               Narrative:    EXAMINATION:  CT ABDOMEN PELVIS WITH IV CONTRAST    CLINICAL HISTORY:  Abdominal pain, acute, nonlocalized;    TECHNIQUE:  Low dose axial images, sagittal and coronal reformations were obtained from the lung bases to the pubic symphysis following the IV administration of 100 mL of Isovue 370 .   additional 3 minute delayed axial images were obtained.  Oral contrast was not given.    COMPARISON:  CT abdomen and pelvis 08/26/2024    FINDINGS:  Base of the heart is within normal limits.  Similar chronic changes in the right lung base.  No consolidation or pleural effusion.    Liver is upper limits of normal in size.  Similar subcentimeter hypoattenuating parenchymal focus at the posterior aspect of the dome, too small to adequately characterize.  Portal vasculature is patent.    Interval cholecystectomy.  Common bile duct measures up to 7 mm in maximum caliber.  Slight intrahepatic biliary ductal dilatation.    Pancreas mildly atrophic.  No pancreatic mass or adjacent inflammatory change.  Spleen, stomach, duodenum and bilateral adrenal glands are within normal limits.    Bilateral kidneys are normal in overall size, shape and location with symmetric normal enhancement, concentrating and excreting contrast appropriately.  Right extrarenal pelvis unchanged no hydronephrosis or significant perinephric stranding.  Few scattered subcentimeter hypoattenuating parenchymal foci at the right kidney which are too small to characterize.  Ureters are normal in course and caliber.  Urinary bladder is within normal limits.  Uterus not identified and likely surgically absent or atrophic.  No adnexal mass or significant volume free pelvic fluid.    Appendix and terminal ileum are within normal limits.  Numerous scattered colonic diverticula without diverticulitis.  No evidence of bowel obstruction or  acute bowel inflammation.  No bowel pneumatosis or portal venous gas.    No ascites, free air or lymphadenopathy by CT criteria.    Scattered mostly calcified atherosclerotic plaque of the aorta extending into its iliac branches.  Aorta is tortuous without aneurysm or dissection.    Extraperitoneal soft tissues and osseous structures appear stable without acute findings.                                       Medications   0.9% NaCl infusion (0 mLs Intravenous Stopped 3/23/25 1059)   morphine injection 4 mg (4 mg Intravenous Given 3/23/25 1005)   ondansetron injection 4 mg (4 mg Intravenous Given 3/23/25 0957)   iopamidoL (ISOVUE-370) injection 100 mL (100 mLs Intravenous Given 3/23/25 1041)   potassium chloride 10 mEq in 100 mL IVPB (20 mEq Intravenous New Bag 3/23/25 1114)   potassium chloride SA CR tablet 40 mEq (40 mEq Oral Given 3/23/25 1117)   morphine injection 4 mg (4 mg Intravenous Given 3/23/25 1415)     Medical Decision Making  63 y/o female presents with ABD pain x2 days, low grade fever, nausea and vomiting.  Primarily c/o left ABD pain.  Family reports noted possible swelling on that side. Last intake yesterday, bland diet.  No sick contacts that they are aware of.    Amount and/or Complexity of Data Reviewed  Labs: ordered.     Details: Potassium 2.7  Radiology: ordered.     Details: cT ABD pelvis negative for acute findings  Discussion of management or test interpretation with external provider(s): Pain improved with morphine, no vomiting in the emergency department after zofran given.  Finding of low potassium on labs, history of per old labs.  Given IV and po potassium will give oral potassium at home x15 days.  Have follow-up with primary care for recheck    Risk  Prescription drug management.                                      Clinical Impression:   Final diagnoses:  [R10.9] Abdominal pain (Primary)  [R11.2] Nausea and vomiting, unspecified vomiting type  [R10.9] Abdominal pain, unspecified  abdominal location  [E87.6] Hypokalemia        ED Disposition Condition    Discharge Stable          ED Prescriptions       Medication Sig Dispense Start Date End Date Auth. Provider    ondansetron (ZOFRAN-ODT) 4 MG TbDL Take 1 tablet (4 mg total) by mouth every 8 (eight) hours as needed (nausea). 20 tablet 3/23/2025 -- Smith Deal FNP    HYDROcodone-acetaminophen (NORCO) 7.5-325 mg per tablet Take 1 tablet by mouth every 6 (six) hours as needed for Pain. 12 tablet 3/23/2025 -- Smith Deal FNP    potassium chloride (KLOR-CON) 10 MEQ TbSR Take 2 tablets (20 mEq total) by mouth once daily. 60 tablet 3/23/2025 -- Smith Deal FNP          Follow-up Information    None            Smith Deal FNP  [id number]         [1]   Social History  Tobacco Use    Smoking status: Former     Current packs/day: 0.00     Average packs/day: 1 pack/day for 40.0 years (40.0 ttl pk-yrs)     Types: Cigarettes     Quit date: 2023     Years since quittin.8     Passive exposure: Past    Smokeless tobacco: Never    Tobacco comments:     1 PPD smoking history  for ~ 40 years: over the last year cut back to ~ 1/2 PPD: recently to 1 or 2 cigarettes/day   Substance Use Topics    Alcohol use: Never    Drug use: Yes     Types: Marijuana        Smith Deal FNP  [id number]

## 2025-03-23 NOTE — ED TRIAGE NOTES
Presents to ED for complaints of nausea, vomiting and abdominal and back pain.  Patient states that she has felt bad a couple days.

## 2025-03-23 NOTE — ED NOTES
Patient resting in bed quietly without distress.  Patient verbalized that her pain is still at a 2/10 and does not request any additional pain medication. No complaints voiced at present.  Patient currently has Potassium infusing without difficulty.

## 2025-03-23 NOTE — DISCHARGE INSTRUCTIONS
Zofran as directed as needed for nausea and vomiting   Clear liquid diet for 24 hours, then advance as tolerated  Follow-up primary care this week, however return to the emergency department immediately if worsening symptoms  Take potassium supplement as directed

## 2025-03-24 ENCOUNTER — TELEPHONE (OUTPATIENT)
Dept: EMERGENCY MEDICINE | Facility: HOSPITAL | Age: 65
End: 2025-03-24
Payer: MEDICARE

## 2025-03-25 ENCOUNTER — RESULTS FOLLOW-UP (OUTPATIENT)
Dept: EMERGENCY MEDICINE | Facility: HOSPITAL | Age: 65
End: 2025-03-25

## 2025-03-25 LAB — UA COMPLETE W REFLEX CULTURE PNL UR: ABNORMAL

## 2025-03-25 RX ORDER — CEPHALEXIN 500 MG/1
500 CAPSULE ORAL EVERY 12 HOURS
Qty: 20 CAPSULE | Refills: 0 | Status: SHIPPED | OUTPATIENT
Start: 2025-03-25 | End: 2025-04-04

## 2025-03-25 NOTE — TELEPHONE ENCOUNTER
----- Message from MIKE Peres sent at 3/25/2025  8:41 AM CDT -----  Please notify the patient that I sent in a prescription for Keflex  ----- Message -----  From: Lab, Background User  Sent: 3/24/2025   9:59 AM CDT  To: [facility] Results Follow Up Pool

## 2025-03-26 LAB
OHS QRS DURATION: 98 MS
OHS QTC CALCULATION: 440 MS

## 2025-04-16 RX ORDER — POTASSIUM CHLORIDE 750 MG/1
20 TABLET, EXTENDED RELEASE ORAL DAILY
Qty: 60 TABLET | Refills: 0 | Status: SHIPPED | OUTPATIENT
Start: 2025-04-16

## 2025-05-13 RX ORDER — POTASSIUM CHLORIDE 750 MG/1
20 TABLET, EXTENDED RELEASE ORAL
Qty: 60 TABLET | Refills: 0 | Status: SHIPPED | OUTPATIENT
Start: 2025-05-13

## 2025-05-27 RX ORDER — CARVEDILOL 6.25 MG/1
6.25 TABLET ORAL 2 TIMES DAILY
Qty: 180 TABLET | Refills: 0 | Status: SHIPPED | OUTPATIENT
Start: 2025-05-27

## 2025-05-27 NOTE — TELEPHONE ENCOUNTER
Please see the attached refill request.   Cosentyx Counseling:  I discussed with the patient the risks of Cosentyx including but not limited to worsening of Crohn's disease, immunosuppression, allergic reactions and infections.  The patient understands that monitoring is required including a PPD at baseline and must alert us or the primary physician if symptoms of infection or other concerning signs are noted.

## 2025-06-03 ENCOUNTER — OFFICE VISIT (OUTPATIENT)
Dept: FAMILY MEDICINE | Facility: CLINIC | Age: 65
End: 2025-06-03
Payer: MEDICARE

## 2025-06-03 VITALS
SYSTOLIC BLOOD PRESSURE: 132 MMHG | RESPIRATION RATE: 18 BRPM | OXYGEN SATURATION: 95 % | HEART RATE: 84 BPM | HEIGHT: 59 IN | BODY MASS INDEX: 29.23 KG/M2 | WEIGHT: 145 LBS | TEMPERATURE: 98 F | DIASTOLIC BLOOD PRESSURE: 86 MMHG

## 2025-06-03 DIAGNOSIS — E11.59 TYPE 2 DIABETES MELLITUS WITH OTHER CIRCULATORY COMPLICATION, WITH LONG-TERM CURRENT USE OF INSULIN: Primary | ICD-10-CM

## 2025-06-03 DIAGNOSIS — E55.9 VITAMIN D DEFICIENCY: ICD-10-CM

## 2025-06-03 DIAGNOSIS — E78.5 HYPERLIPIDEMIA, UNSPECIFIED HYPERLIPIDEMIA TYPE: ICD-10-CM

## 2025-06-03 DIAGNOSIS — I10 PRIMARY HYPERTENSION: ICD-10-CM

## 2025-06-03 DIAGNOSIS — J30.9 ALLERGIC RHINITIS, UNSPECIFIED SEASONALITY, UNSPECIFIED TRIGGER: ICD-10-CM

## 2025-06-03 DIAGNOSIS — R11.0 NAUSEA: ICD-10-CM

## 2025-06-03 DIAGNOSIS — Z79.4 TYPE 2 DIABETES MELLITUS WITH OTHER CIRCULATORY COMPLICATION, WITH LONG-TERM CURRENT USE OF INSULIN: Primary | ICD-10-CM

## 2025-06-03 LAB
ANION GAP SERPL CALCULATED.3IONS-SCNC: 13 MMOL/L (ref 7–16)
BUN SERPL-MCNC: 16 MG/DL (ref 10–20)
BUN/CREAT SERPL: 19 (ref 6–20)
CALCIUM SERPL-MCNC: 9.5 MG/DL (ref 8.4–10.2)
CHLORIDE SERPL-SCNC: 106 MMOL/L (ref 98–107)
CO2 SERPL-SCNC: 25 MMOL/L (ref 23–31)
CREAT SERPL-MCNC: 0.86 MG/DL (ref 0.55–1.02)
EGFR (NO RACE VARIABLE) (RUSH/TITUS): 75 ML/MIN/1.73M2
GLUCOSE SERPL-MCNC: 73 MG/DL (ref 82–115)
POTASSIUM SERPL-SCNC: 4 MMOL/L (ref 3.5–5.1)
SODIUM SERPL-SCNC: 140 MMOL/L (ref 136–145)

## 2025-06-03 PROCEDURE — 80048 BASIC METABOLIC PNL TOTAL CA: CPT | Mod: ,,, | Performed by: CLINICAL MEDICAL LABORATORY

## 2025-06-03 RX ORDER — DEXAMETHASONE SODIUM PHOSPHATE 4 MG/ML
2 INJECTION, SOLUTION INTRA-ARTICULAR; INTRALESIONAL; INTRAMUSCULAR; INTRAVENOUS; SOFT TISSUE
Status: COMPLETED | OUTPATIENT
Start: 2025-06-03 | End: 2025-06-03

## 2025-06-03 RX ADMIN — DEXAMETHASONE SODIUM PHOSPHATE 2 MG: 4 INJECTION, SOLUTION INTRA-ARTICULAR; INTRALESIONAL; INTRAMUSCULAR; INTRAVENOUS; SOFT TISSUE at 03:06

## 2025-06-04 RX ORDER — ALBUTEROL SULFATE 90 UG/1
2 INHALANT RESPIRATORY (INHALATION) 2 TIMES DAILY
Qty: 18 G | Refills: 6 | Status: SHIPPED | OUTPATIENT
Start: 2025-06-04

## 2025-06-04 RX ORDER — FLUTICASONE PROPIONATE 50 MCG
1 SPRAY, SUSPENSION (ML) NASAL DAILY
Qty: 16 G | Refills: 3 | Status: SHIPPED | OUTPATIENT
Start: 2025-06-04

## 2025-06-04 RX ORDER — ONDANSETRON 4 MG/1
4 TABLET, ORALLY DISINTEGRATING ORAL EVERY 8 HOURS PRN
Qty: 20 TABLET | Refills: 0 | Status: SHIPPED | OUTPATIENT
Start: 2025-06-04

## 2025-06-04 RX ORDER — AMLODIPINE BESYLATE 10 MG/1
10 TABLET ORAL DAILY
Qty: 90 TABLET | Refills: 1 | Status: SHIPPED | OUTPATIENT
Start: 2025-06-04

## 2025-06-04 RX ORDER — ATORVASTATIN CALCIUM 40 MG/1
40 TABLET, FILM COATED ORAL DAILY
Qty: 90 TABLET | Refills: 1 | Status: SHIPPED | OUTPATIENT
Start: 2025-06-04

## 2025-06-04 RX ORDER — CARVEDILOL 6.25 MG/1
6.25 TABLET ORAL 2 TIMES DAILY
Qty: 180 TABLET | Refills: 0 | Status: SHIPPED | OUTPATIENT
Start: 2025-06-04

## 2025-06-04 RX ORDER — CETIRIZINE HYDROCHLORIDE 10 MG/1
10 TABLET ORAL NIGHTLY
Qty: 30 TABLET | Refills: 1 | Status: SHIPPED | OUTPATIENT
Start: 2025-06-04

## 2025-06-04 RX ORDER — ACETAMINOPHEN 500 MG
5000 TABLET ORAL
Qty: 90 TABLET | Refills: 1 | Status: SHIPPED | OUTPATIENT
Start: 2025-06-04

## 2025-07-31 DIAGNOSIS — J30.9 ALLERGIC RHINITIS, UNSPECIFIED SEASONALITY, UNSPECIFIED TRIGGER: ICD-10-CM

## 2025-07-31 RX ORDER — CETIRIZINE HYDROCHLORIDE 10 MG/1
10 TABLET ORAL NIGHTLY
Qty: 30 TABLET | Refills: 0 | Status: SHIPPED | OUTPATIENT
Start: 2025-07-31

## (undated) DEVICE — CORD LAP 10 DISP

## (undated) DEVICE — Device

## (undated) DEVICE — WARMER BLUE HEAT SCOPE 3-12MM

## (undated) DEVICE — SUT MONOCYRL 4-0 PS2 UND

## (undated) DEVICE — APPLIER CLIP ENDO MED/LG 10MM

## (undated) DEVICE — TROCAR KII FIOS ZTHREAD 11X100

## (undated) DEVICE — GLOVE SENSICARE PI GRN 7

## (undated) DEVICE — ELECTRODE LAPSCP L HOOK 36CM

## (undated) DEVICE — SOL NACL IRR 1000ML BTL

## (undated) DEVICE — CART INSERT SCISSOR F 5X34CM

## (undated) DEVICE — GLOVE SENSICARE PI SURG 6.5

## (undated) DEVICE — GLOVE SENSICARE PI SURG 7

## (undated) DEVICE — CANNULA LAP SEAL Z THRD 5X100

## (undated) DEVICE — BAG TISS RETRV MONARCH 10MM

## (undated) DEVICE — STRIP MEDI WND CLSR 1/2X4IN

## (undated) DEVICE — GLOVE SENSICARE PI GRN 6.5

## (undated) DEVICE — TROCAR ENDO Z THREAD KII 5X100

## (undated) DEVICE — GOWN POLY REINF BRTH SLV XL

## (undated) DEVICE — SET PNEUMOCLEAR HEAT HUM SE HF

## (undated) DEVICE — ADHESIVE MASTISOL VIAL 48/BX